# Patient Record
Sex: FEMALE | Employment: OTHER | URBAN - NONMETROPOLITAN AREA
[De-identification: names, ages, dates, MRNs, and addresses within clinical notes are randomized per-mention and may not be internally consistent; named-entity substitution may affect disease eponyms.]

---

## 2017-02-17 ENCOUNTER — TELEPHONE (OUTPATIENT)
Dept: ALLERGY | Facility: OTHER | Age: 62
End: 2017-02-17

## 2017-02-17 DIAGNOSIS — J30.89 PERENNIAL ALLERGIC RHINITIS: ICD-10-CM

## 2017-03-10 ENCOUNTER — TELEPHONE (OUTPATIENT)
Dept: ALLERGY | Facility: OTHER | Age: 62
End: 2017-03-10

## 2017-03-10 NOTE — TELEPHONE ENCOUNTER
[facility] requests a refill of SLIT drops.  This will be done after a signature is obtained from the ENT provider.    Rukhsanas is notified that her last follow-up visit was 11/2016  with Isaura Wilkinson NP.  She is due to fu in November.    She may call her pharmacy should she need new epi pens, and call with concerns prior to fu.    Taya Esteban RN

## 2017-03-22 ENCOUNTER — TRANSFERRED RECORDS (OUTPATIENT)
Dept: HEALTH INFORMATION MANAGEMENT | Facility: CLINIC | Age: 62
End: 2017-03-22

## 2017-03-23 ENCOUNTER — HOSPITAL ENCOUNTER (EMERGENCY)
Facility: HOSPITAL | Age: 62
Discharge: HOME | End: 2017-03-23
Attending: NURSE PRACTITIONER | Admitting: NURSE PRACTITIONER
Payer: MEDICARE

## 2017-03-23 VITALS — OXYGEN SATURATION: 94 % | RESPIRATION RATE: 16 BRPM | TEMPERATURE: 97.9 F | HEART RATE: 93 BPM

## 2017-03-23 DIAGNOSIS — B02.9 HERPES ZOSTER WITHOUT COMPLICATION: ICD-10-CM

## 2017-03-23 PROCEDURE — 99213 OFFICE O/P EST LOW 20 MIN: CPT | Performed by: NURSE PRACTITIONER

## 2017-03-23 PROCEDURE — 99213 OFFICE O/P EST LOW 20 MIN: CPT

## 2017-03-23 RX ORDER — ACYCLOVIR 800 MG/1
800 TABLET ORAL
Qty: 35 TABLET | Refills: 0 | Status: SHIPPED | OUTPATIENT
Start: 2017-03-23 | End: 2017-11-14

## 2017-03-23 ASSESSMENT — ENCOUNTER SYMPTOMS
VOMITING: 0
FLANK PAIN: 0
DIFFICULTY URINATING: 0
CHILLS: 0
DIARRHEA: 0
DYSURIA: 0
HEMATURIA: 0
FEVER: 0
PSYCHIATRIC NEGATIVE: 1
APPETITE CHANGE: 0
COUGH: 0
NUMBNESS: 0
NAUSEA: 0
ACTIVITY CHANGE: 0
FREQUENCY: 0

## 2017-03-23 NOTE — ED NOTES
Pt presents with c/o hip pain that began about 2 weeks ago and has now developed a rash in the area.

## 2017-03-23 NOTE — ED PROVIDER NOTES
History     Chief Complaint   Patient presents with     Hip Pain     with a rash lt side only pain 7/10     The history is provided by the patient. No  was used.     Mattie Carranza is a 61 year old female who presents with pain to her left hip for the past 2 weeks and a rash developed yesterday. Rash has a burning characteristic. She took Aleve with mild effectiveness. She's felt fatigued. Denies fever, chills, or night sweats. Eating and drinking well. Bowel and bladder are working well. Denies injury or trauma. She had chicken pox as a child. She is dealing with family stress with her daughter that has been ongoing since Christmas. She feels the stress is manageable and follows with her PCP.     I have reviewed the Medications, Allergies, Past Medical and Surgical History, and Social History in the Epic system.    Review of Systems   Constitutional: Negative for activity change, appetite change, chills and fever.   Respiratory: Negative for cough.    Gastrointestinal: Negative for diarrhea, nausea and vomiting.   Genitourinary: Negative for difficulty urinating, dysuria, flank pain, frequency, hematuria, urgency and vaginal discharge.   Skin: Positive for rash.        To left lateral side of abd/back.    Neurological: Negative for numbness.   Psychiatric/Behavioral: Negative.        Physical Exam   Pulse: 93  Temp: 97.9  F (36.6  C)  Resp: 16  SpO2: 94 %  Physical Exam   Constitutional: She is oriented to person, place, and time. She appears well-developed and well-nourished. No distress.   HENT:   Mouth/Throat: Oropharynx is clear and moist. No oropharyngeal exudate.   Neck: Normal range of motion. Neck supple.   Cardiovascular: Normal rate, regular rhythm, normal heart sounds and intact distal pulses.    No murmur heard.  Pulmonary/Chest: Effort normal. No respiratory distress. She has no wheezes. She has no rales.   Abdominal: Soft. She exhibits no distension. There is no tenderness.  There is no rebound and no guarding.   Musculoskeletal: Normal range of motion. She exhibits no edema, tenderness or deformity.   ROM and CMS intact to left hip and bilateral lower extremities. Bilateral dorsalis pedal pulses +2.    Lymphadenopathy:     She has no cervical adenopathy.   Neurological: She is alert and oriented to person, place, and time.   Skin: Skin is warm and dry. Rash noted. She is not diaphoretic.   Clustered erythema vesicles to left lateral side of lower abdomen and posterior left side of back in a linear pattern. NO rash on right side of back. No drainage from vesicles.    Psychiatric: She has a normal mood and affect. Her behavior is normal.   Nursing note and vitals reviewed.      ED Course     ED Course     Procedures    Assessments & Plan (with Medical Decision Making)     Discussed plan of care. She would like Acyclovir as her co pay would be more affordable for her. She verbalized understanding. All questions answered.     I have reviewed the nursing notes.    I have reviewed the findings, diagnosis, plan and need for follow up with the patient.  Discharged in stable condition.     Discharge Medication List as of 3/23/2017  2:58 PM      START taking these medications    Details   acyclovir (ZOVIRAX) 800 MG tablet Take 1 tablet (800 mg) by mouth 5 times daily for 7 days, Disp-35 tablet, R-0, E-Prescribe             Final diagnoses:   Herpes zoster without complication     Take Acyclovir as ordered.   You can try Capsaicin cream to rash.   Take tylenol and or ibuprofen for discomfort.   Follow up with PCP with any increase in symptoms or concerns.   Return to urgent care or emergency department with any increase in symptoms or concerns.     BAYRON Sampson  3/23/2017  2:18 PM  [facility]       Marychuy Duran NP  03/28/17 [id number]

## 2017-03-23 NOTE — ED AVS SNAPSHOT
[facility]    [address]    [facility] [address]    Phone:  [phone]                                       Mattie Carranza   MRN: [id number]    Department:  [facility]   Date of Visit:  3/23/2017           Patient Information     Date Of Birth          1955        Your diagnoses for this visit were:     Herpes zoster without complication        You were seen by Marychuy Duran NP.      Follow-up Information     Follow up with Gina Howard MD.    Why:  As needed, If symptoms worsen    Contact information:    [facility]  [address] [facility] [address]  [phone]          Follow up with [facility].    Specialty:  EMERGENCY MEDICINE    Why:  As needed, If symptoms worsen    Contact information:    [address]  [facility] [address] [phone]    Additional information:    From [facility]: Take [address]. Turn left at [address]. Turn left at the first stoplight on [address]. At the first stop sign, take a right onto [address]. Take a left into the parking lot and continue through until you reach the North enterance of the building.       From [place]: Take [address]. Take the [address] ramp towards [place]. Turn left onto [address]. Take a slight right onto [address]. Turn left at the first stoplight on [address]. At the first stop sign, take a right onto [address]. Take a left into the parking lot and continue through until you reach the North enterance of the building.       From [place]: Take [address]. Take a right at [address]. At the first stop sign, take a right onto [address]. Take a left into the parking lot and continue through until you reach the North enterance of the building.         Discharge Instructions       Take Acyclovir as ordered.   You can try Capsaicin cream to rash.   Take tylenol and  or ibuprofen for discomfort.   Follow up with PCP with any increase in symptoms or concerns.   Return to urgent care or emergency department with any increase in symptoms or concerns.     Discharge References/Attachments     SHINGLES (HERPES ZOSTER) (ENGLISH)         Review of your medicines      START taking        Dose / Directions Last dose taken    acyclovir 800 MG tablet   Commonly known as:  ZOVIRAX   Dose:  800 mg   Quantity:  35 tablet        Take 1 tablet (800 mg) by mouth 5 times daily for 7 days   Refills:  0          Our records show that you are taking the medicines listed below. If these are incorrect, please call your family doctor or clinic.        Dose / Directions Last dose taken    albuterol 108 (90 BASE) MCG/ACT Inhaler   Commonly known as:  PROAIR HFA/PROVENTIL HFA/VENTOLIN HFA   Dose:  2 puff   Quantity:  1 Inhaler        Inhale 2 puffs into the lungs every 6 hours as needed for shortness of breath / dyspnea.   Refills:  1        cholecalciferol 5000 UNITS Caps   Dose:  5000 Units        Take 1 capsule (5,000 Units) by mouth daily   Refills:  0        EPINEPHrine 0.3 MG/0.3ML injection   Dose:  0.3 mg   Quantity:  2 each        Inject 0.3 mLs (0.3 mg) into the muscle once as needed   Refills:  1        fluticasone 27.5 MCG/SPRAY spray   Commonly known as:  VERAMYST   Dose:  2 spray   Quantity:  30 g        Spray 2 sprays into both nostrils daily   Refills:  12        KLONOPIN PO   Dose:  0.5 mg        Take 0.5 mg by mouth At Bedtime   Refills:  0        LANCETS MISC.        Test 1 time daily   Refills:  0        LEVOTHYROXINE SODIUM PO   Dose:  50 mcg        Take 50 mcg by mouth   Refills:  0        loratadine 10 MG tablet   Commonly known as:  CLARITIN   Dose:  10 mg   Quantity:  90 tablet        Take 1 tablet (10 mg) by mouth daily   Refills:  3        montelukast 10 MG tablet   Commonly known as:  SINGULAIR   Dose:  10 mg   Quantity:  90 tablet        Take 1 tablet (10 mg) by mouth At  "Bedtime   Refills:  3        PRECISION XTRA GLUCOSE test strip   Generic drug:  blood glucose monitoring        Text 1 time by finger poke route every day   Refills:  0        PROBIOTIC FORMULA PO        Take  by mouth daily.      Refills:  0        SIMVASTATIN PO   Dose:  40 mg        Take 40 mg by mouth At Bedtime   Refills:  0        SUBLINGUAL IMMUNOTHERAPY (SLIT)   Quantity:  1 Bottle        Continue SLIT, 1 drop TID (SL) , following standard maintenance protocol.   Refills:  3        TRAZODONE HCL PO   Dose:  100 mg        Take 100 mg by mouth At Bedtime   Refills:  0                Prescriptions were sent or printed at these locations (1 Prescription)                   [id number] - [place], [place] - [address] [place] [address]  AT [facility] [address]   [address]  [facility] [address]    Telephone:  [phone]   Fax:  [phone]   Hours:                  E-Prescribed (1 of 1)         acyclovir (ZOVIRAX) 800 MG tablet                Orders Needing Specimen Collection     None      Pending Results     No orders found from 3/21/2017 to 3/24/2017.            Pending Culture Results     No orders found from 3/21/2017 to 3/24/2017.            Thank you for choosing [facility]       Thank you for choosing [facility] for your care. Our goal is always to provide you with excellent care. Hearing back from our patients is one way we can continue to improve our services. Please take a few minutes to complete the written survey that you may receive in the mail after you visit with us. Thank you!        [facility] Information     [employer] lets you send messages to your doctor, view your test results, renew your prescriptions, schedule appointments and more. To sign up, go to [email or web address] . Click on \"Log in\" on the left side of the screen, which will take you to the Welcome page. Then click on \"Sign up Now\" on the right side of the page.     You will be asked to enter the access code listed " below, as well as some personal information. Please follow the directions to create your username and password.     Your access code is: [email or web address]  Expires: 2017  2:53 PM     Your access code will  in 90 days. If you need help or a new code, please call your [facility] clinic or [phone].        Care EveryWhere ID     This is your Care EveryWhere ID. This could be used by other organizations to access your [facility] medical records  [id number]        After Visit Summary       This is your record. Keep this with you and show to your community pharmacist(s) and doctor(s) at your next visit.

## 2017-03-23 NOTE — ED NOTES
"Pt ambulated to room 4 independently. Pt reports pain \"inside\" her torso only on the lt side for past 2 weeks, rash \"blisters\" across Lt hip started yesterday. No discomfort or rash on the rt side of her body.   "

## 2017-03-23 NOTE — ED NOTES
Patient discharged ambulatory with verbal understanding stated to  her prescription at [facility] in [place] and to follow up for worsening symptoms or concerns.

## 2017-03-23 NOTE — ED AVS SNAPSHOT
[facility]    [address]    [place] [address]    Phone:  [phone]                                       Mattie Carranza   MRN: [id number]    Department:  [facility]   Date of Visit:  3/23/2017           After Visit Summary Signature Page     I have received my discharge instructions, and my questions have been answered. I have discussed any challenges I see with this plan with the nurse or doctor.    ..........................................................................................................................................  Patient/Patient Representative Signature      ..........................................................................................................................................  Patient Representative Print Name and Relationship to Patient    ..................................................               ................................................  Date                                            Time    ..........................................................................................................................................  Reviewed by Signature/Title    ...................................................              ..............................................  Date                                                            Time

## 2017-05-02 DIAGNOSIS — I20.0 UNSTABLE ANGINA (H): Primary | ICD-10-CM

## 2017-05-10 DIAGNOSIS — I20.0 UNSTABLE ANGINA (H): Primary | ICD-10-CM

## 2017-05-11 ENCOUNTER — HOSPITAL ENCOUNTER (OUTPATIENT)
Dept: NUCLEAR MEDICINE | Facility: HOSPITAL | Age: 62
Discharge: HOME | End: 2017-05-11
Attending: NURSE PRACTITIONER | Admitting: NURSE PRACTITIONER
Payer: MEDICARE

## 2017-05-11 PROCEDURE — A9500 TC99M SESTAMIBI: HCPCS | Mod: TC

## 2017-05-11 PROCEDURE — 78452 HT MUSCLE IMAGE SPECT MULT: CPT | Mod: TC

## 2017-05-11 PROCEDURE — 93016 CV STRESS TEST SUPVJ ONLY: CPT | Performed by: INTERNAL MEDICINE

## 2017-05-11 PROCEDURE — 93018 CV STRESS TEST I&R ONLY: CPT | Performed by: INTERNAL MEDICINE

## 2017-05-11 PROCEDURE — 93017 CV STRESS TEST TRACING ONLY: CPT | Mod: TC | Performed by: INTERNAL MEDICINE

## 2017-06-08 DIAGNOSIS — Z12.31 VISIT FOR SCREENING MAMMOGRAM: Primary | ICD-10-CM

## 2017-06-08 PROCEDURE — G0202 SCR MAMMO BI INCL CAD: HCPCS | Mod: TC

## 2017-06-08 PROCEDURE — 77063 BREAST TOMOSYNTHESIS BI: CPT | Mod: TC

## 2017-06-12 ENCOUNTER — TELEPHONE (OUTPATIENT)
Dept: ALLERGY | Facility: OTHER | Age: 62
End: 2017-06-12

## 2017-06-12 DIAGNOSIS — T78.40XD ALLERGIC REACTION, SUBSEQUENT ENCOUNTER: Primary | ICD-10-CM

## 2017-06-12 NOTE — TELEPHONE ENCOUNTER
"[facility] requests a refill of SLIT drops.  This will be done after a signature is obtained from the ENT provider.    Their last follow-up visit was 11/2016 with Ruma Minh.  She is not due for an appointment at this time.    She will fu in November.  She is doing fine on drops.  She does not have current epi pens, and is not willing to buy them.    She states they are too expensive.  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  I told her this would have to be ordered through [facility], if she is in agreement.    This message is forwarded to [facility] with her chart, to advise.    Taya Esteban RN     "

## 2017-06-13 RX ORDER — EPINEPHRINE 0.3 MG/.3ML
0.3 INJECTION SUBCUTANEOUS PRN
Qty: 0.6 ML | Refills: 1 | Status: SHIPPED | OUTPATIENT
Start: 2017-06-13 | End: 2017-11-14

## 2017-06-13 NOTE — TELEPHONE ENCOUNTER
Will call in Twinject- See if these are covered first. TR    Sent to [facility].

## 2017-06-13 NOTE — TELEPHONE ENCOUNTER
Mattie is notified that Twinject was sent to her pharmacy.  She will call if there is a problem with that being covered.  Taya Esteban

## 2017-08-30 ENCOUNTER — TELEPHONE (OUTPATIENT)
Dept: ALLERGY | Facility: OTHER | Age: 62
End: 2017-08-30

## 2017-08-30 NOTE — TELEPHONE ENCOUNTER
[facility] requests a refill of SLIT drops.  This will be done after a signature is obtained from the ENT provider.    I spoke with Mattie.  She is doing fine on drops and has current epi pens.    Her last follow-up visit was 11/2016 with [facility] Ramos.  She is due for an appointment in November, and this has been arranged for her for 11/14/17.    She agrees with this plan.  Taya Esteban RN

## 2017-11-14 ENCOUNTER — OFFICE VISIT (OUTPATIENT)
Dept: OTOLARYNGOLOGY | Facility: OTHER | Age: 62
End: 2017-11-14
Attending: PHYSICIAN ASSISTANT
Payer: MEDICARE

## 2017-11-14 VITALS
SYSTOLIC BLOOD PRESSURE: 132 MMHG | DIASTOLIC BLOOD PRESSURE: 70 MMHG | WEIGHT: 243 LBS | BODY MASS INDEX: 35.99 KG/M2 | HEIGHT: 69 IN | OXYGEN SATURATION: 93 % | TEMPERATURE: 98.4 F | HEART RATE: 67 BPM | RESPIRATION RATE: 20 BRPM

## 2017-11-14 DIAGNOSIS — G47.33 OSA (OBSTRUCTIVE SLEEP APNEA): ICD-10-CM

## 2017-11-14 DIAGNOSIS — J30.2 SEASONAL ALLERGIC RHINITIS, UNSPECIFIED CHRONICITY, UNSPECIFIED TRIGGER: Primary | ICD-10-CM

## 2017-11-14 DIAGNOSIS — H10.13 ALLERGIC CONJUNCTIVITIS, BILATERAL: ICD-10-CM

## 2017-11-14 DIAGNOSIS — R53.83 OTHER FATIGUE: ICD-10-CM

## 2017-11-14 DIAGNOSIS — Z71.6 ENCOUNTER FOR TOBACCO USE CESSATION COUNSELING: ICD-10-CM

## 2017-11-14 DIAGNOSIS — J30.89 PERENNIAL ALLERGIC RHINITIS: ICD-10-CM

## 2017-11-14 LAB
BASOPHILS # BLD AUTO: 0 10E9/L (ref 0–0.2)
BASOPHILS NFR BLD AUTO: 0.3 %
DIFFERENTIAL METHOD BLD: ABNORMAL
EOSINOPHIL # BLD AUTO: 0.1 10E9/L (ref 0–0.7)
EOSINOPHIL NFR BLD AUTO: 0.5 %
ERYTHROCYTE [DISTWIDTH] IN BLOOD BY AUTOMATED COUNT: 12.5 % (ref 10–15)
HCT VFR BLD AUTO: 43.1 % (ref 35–47)
HGB BLD-MCNC: 15.1 G/DL (ref 11.7–15.7)
IMM GRANULOCYTES # BLD: 0.1 10E9/L (ref 0–0.4)
IMM GRANULOCYTES NFR BLD: 0.5 %
LYMPHOCYTES # BLD AUTO: 1.8 10E9/L (ref 0.8–5.3)
LYMPHOCYTES NFR BLD AUTO: 14.9 %
MCH RBC QN AUTO: 30.9 PG (ref 26.5–33)
MCHC RBC AUTO-ENTMCNC: 35 G/DL (ref 31.5–36.5)
MCV RBC AUTO: 88 FL (ref 78–100)
MONOCYTES # BLD AUTO: 0.7 10E9/L (ref 0–1.3)
MONOCYTES NFR BLD AUTO: 6.1 %
NEUTROPHILS # BLD AUTO: 9.2 10E9/L (ref 1.6–8.3)
NEUTROPHILS NFR BLD AUTO: 77.7 %
NRBC # BLD AUTO: 0 10*3/UL
NRBC BLD AUTO-RTO: 0 /100
PLATELET # BLD AUTO: 134 10E9/L (ref 150–450)
RBC # BLD AUTO: 4.88 10E12/L (ref 3.8–5.2)
WBC # BLD AUTO: 11.8 10E9/L (ref 4–11)

## 2017-11-14 PROCEDURE — 85025 COMPLETE CBC W/AUTO DIFF WBC: CPT | Mod: ZL | Performed by: PHYSICIAN ASSISTANT

## 2017-11-14 PROCEDURE — 99212 OFFICE O/P EST SF 10 MIN: CPT

## 2017-11-14 PROCEDURE — 36415 COLL VENOUS BLD VENIPUNCTURE: CPT | Mod: ZL | Performed by: PHYSICIAN ASSISTANT

## 2017-11-14 PROCEDURE — 99213 OFFICE O/P EST LOW 20 MIN: CPT | Performed by: PHYSICIAN ASSISTANT

## 2017-11-14 RX ORDER — LEVOCETIRIZINE DIHYDROCHLORIDE 5 MG/1
5 TABLET, FILM COATED ORAL EVERY EVENING
Qty: 90 TABLET | Refills: 11 | Status: SHIPPED | OUTPATIENT
Start: 2017-11-14 | End: 2018-07-31

## 2017-11-14 ASSESSMENT — PAIN SCALES - GENERAL: PAINLEVEL: SEVERE PAIN (6)

## 2017-11-14 NOTE — PATIENT INSTRUCTIONS
Continue with allergy drops.   Continue w/ Natan Med Rinse.   CBC to be completed. Nurse will call w/ results.   Start Xyzal one tablet daily. Hold Claritin.   Continue with Veramyst nasal spray.   Consider alternative nasal spray (Dymista) Call nurse in 2-3 weeks if there is no improvement.   Nurse- [facility] [phone]     If there are concerns or questions, Call [phone] and ask for nurse

## 2017-11-14 NOTE — PROGRESS NOTES
Chief Complaint   Patient presents with     RECHECK     Follow up SLIT   Mattie Carranza presents for a 12 month follow up for SLIT    Has been doing well with drops.  Denies oral itching/swelling  Has noticed improvement with allergy symptoms    She has abdominal discomfort/ GI complaints. Patient is seeing her PCP shortly.     Has been using Claritin and Singulair over the summer and fall for symptoms without much relief. Did encourage to stay on daily antihistamine at least until she reaches maintenance.   Currently on TID of SLIT, maintenance dose.      She does not tolerate chriss med sinus rinses.    No jessi sinusitis  No recent illnesses or sinus infections     Epi pen is up to date    Patient is on CPAP with good use. Patient reports full night use. No concerns. New machine was received this past few weeks.       Past Medical History:   Diagnosis Date     Adenomatous colon polyp 10/17/2011     ADHD (attention deficit hyperactivity disorder) 10/17/2011     Bipolar I disorder 10/17/2011     Degenerative disk disease 10/17/2011     Diabetes type 2, controlled 10/17/2011     Diverticulosis 10/17/2011     Dyslipidemia 10/17/2011     Insomnia 9/20/2012     Irritable bowel syndrome 10/17/2011     JOSE on CPAP 9/20/2012     Seizures 8/1/2012     Tobacco use disorder 10/17/2011        Allergies   Allergen Reactions     Arthrotec      GI upset     Aspirin Hives     Bupropion Nausea     pain     Codeine Nausea     [employer] Hives     Diazepam Other (See Comments)     Valium  Increased anxiety     Dye [Contrast Dye] Hives     IV dye, iodine containing contrast media     Iodine      Latex      Meperidine Nausea     FUMCM Dischg Summary. If given alone     Midazolam      FUMCM Dischg Summary. Patient unaware     Midazolam Hcl      Versed     Other [Seasonal Allergies] Hives     Also allergic to sun.  Allergist told her she is allergic to the sun.     Penicillins Hives     Shellfish Allergy Hives and  "Swelling     Shrimp     Sulfa Drugs Hives     Sulfa (sulfonamide antibiotics)       Ziprasidone      FUMCM Dischg Summary. Patient unaware     Current Outpatient Prescriptions   Medication     SUBLINGUAL IMMUNOTHERAPY, SLIT,     SIMVASTATIN PO     TRAZODONE HCL PO     ClonazePAM (KLONOPIN PO)     montelukast (SINGULAIR) 10 MG tablet     loratadine (CLARITIN) 10 MG tablet     fluticasone (VERAMYST) 27.5 MCG/SPRAY nasal spray     cholecalciferol 5000 UNITS CAPS     LEVOTHYROXINE SODIUM PO     EPINEPHrine (EPIPEN) 0.3 MG/0.3ML injection     LANCETS MISC.     glucose blood VI test strips (PRECISION XTRA GLUCOSE) strip     Probiotic Product (PROBIOTIC FORMULA PO)     albuterol (PROAIR HFA, PROVENTIL HFA, VENTOLIN HFA) 108 (90 BASE) MCG/ACT inhaler     No current facility-administered medications for this visit.       ROS: 10 point ROS neg other than the symptoms noted above in the HPI.  /70  Pulse 67  Temp 98.4  F (36.9  C) (Tympanic)  Resp 20  Ht 5' 9\" (1.753 m)  Wt 243 lb (110.2 kg)  SpO2 93%  BMI 35.88 kg/m2  General - The patient is well nourished and well developed.  Alert and oriented to person and place, answers questions and cooperates with examination appropriately.   Head and Face - Normocephalic and atraumatic, with no gross asymmetry noted.  The facial nerve is intact, with strong symmetric movements.  Voice and Breathing - The patient was breathing comfortably without the use of accessory muscles. There was no wheezing, stridor, or stertor.  The patients voice was clear and strong, and had appropriate pitch and quality.  Ears - External ears are normal in appearance. The external auditory canals are patent, the tympanic membranes are intact without effusion, retraction or mass.  Bony landmarks are intact.  Eyes - Extraocular movements intact.  Conjunctiva were not injected.  Mouth - Examination of the oral cavity showed pink, healthy oral mucosa. No lesions or ulcerations noted.  The tongue was " mobile and midline, and the dentition were in good condition.    Throat - The walls of the oropharynx were smooth, pink, moist, symmetric, and had no lesions or ulcerations.  The tonsillar pillars and soft palate were symmetric.  The uvula was midline on elevation.    Neck -  Palpation of the occipital, submental, submandibular, internal jugular chain, and supraclavicular nodes did not demonstrate any abnormal lymph nodes or masses.  Palpation of the thyroid was soft and smooth, with no nodules or goiter appreciated.  The trachea was mobile and midline. No palpable lymphadenopathy or mass.   Nose - External contour is symmetric, no gross deflection or scars.  Nasal mucosa is pink and moist with no abnormal mucus.  The septum was intact, turbinates of normal size and position.  No polyps, masses, or purulence noted on examination.       ASSESSMENT:    ICD-10-CM    1. Seasonal allergic rhinitis, unspecified chronicity, unspecified trigger J30.2 levocetirizine (XYZAL) 5 MG tablet   2. Perennial allergic rhinitis J30.89    3. Allergic conjunctivitis, bilateral H10.13 levocetirizine (XYZAL) 5 MG tablet   4. Other fatigue R53.83 CBC with platelets differential     CANCELED: CBC with platelets and differential   5. Encounter for tobacco use cessation counseling Z71.6    6. JOSE (obstructive sleep apnea) G47.33          Continue with allergy drops.   Continue w/ Natan Med Rinse.   CBC to be completed. Nurse will call w/ results.   Patient has an appointment w/ PCP for several other complaints.     Start Xyzal one tablet daily. Hold Claritin.   Continue with Veramyst nasal spray.   Consider alternative nasal spray (Dymista) Call nurse in 2-3 weeks if there is no improvement.   If continued symptoms, may repeat MQT.   Continue with CPAP. Untreated sleep apnea risk factors reviewed with patient.     It takes 20 years of quitting tobacco to be at same risk of developing head and neck cancer as a never smoker.  This was discussed  with the patient today and they voiced understanding.  Tobacco cessation was strongly encouraged. Patient did quit smoking.       Samantha Donato PA-C  [facility], [place]  [phone]

## 2017-11-14 NOTE — NURSING NOTE
"Chief Complaint   Patient presents with     RECHECK     Follow up SLIT       Initial /70  Pulse 67  Temp 98.4  F (36.9  C) (Tympanic)  Resp 20  Ht 5' 9\" (1.753 m)  Wt 243 lb (110.2 kg)  SpO2 93%  BMI 35.88 kg/m2 Estimated body mass index is 35.88 kg/(m^2) as calculated from the following:    Height as of this encounter: 5' 9\" (1.753 m).    Weight as of this encounter: 243 lb (110.2 kg).  Medication Reconciliation: complete   Ruma Shelton      "

## 2017-11-14 NOTE — MR AVS SNAPSHOT
"              After Visit Summary   11/14/2017    Mattie Carranza    MRN: [id number]           Patient Information     Date Of Birth          1955        Visit Information        Provider Department      11/14/2017 11:15 AM Samantha Donato PA-C [facility]        Today's Diagnoses     Seasonal allergic rhinitis, unspecified chronicity, unspecified trigger    -  1    Perennial allergic rhinitis        Allergic conjunctivitis, bilateral        Other fatigue          Care Instructions    Continue with allergy drops.   Continue w/ Natan Med Rinse.   CBC to be completed. Nurse will call w/ results.   Start Xyzal one tablet daily. Hold Claritin.   Continue with Veramyst nasal spray.   Consider alternative nasal spray (Dymista) Call nurse in 2-3 weeks if there is no improvement.   Nurse- [facility] [phone]     If there are concerns or questions, Call [phone] and ask for nurse                  Follow-ups after your visit        Who to contact     If you have questions or need follow up information about today's clinic visit or your schedule please contact [facility] directly at [phone].  Normal or non-critical lab and imaging results will be communicated to you by [employer], letter or phone within 4 business days after the clinic has received the results. If you do not hear from us within 7 days, please contact the clinic through [employer] or phone. If you have a critical or abnormal lab result, we will notify you by phone as soon as possible.  Submit refill requests through [employer] or call your pharmacy and they will forward the refill request to us. Please allow 3 business days for your refill to be completed.          Additional Information About Your Visit        Stemgent Information     [employer]gent lets you send messages to your doctor, view your test results, renew your prescriptions, schedule appointments and more. To sign up, go to [email or web address] . Click on \"Log in\" on the left side of " "the screen, which will take you to the Welcome page. Then click on \"Sign up Now\" on the right side of the page.     You will be asked to enter the access code listed below, as well as some personal information. Please follow the directions to create your username and password.     Your access code is: [email or web address]  Expires: 2018 11:51 AM     Your access code will  in 90 days. If you need help or a new code, please call your [facility] clinic or [phone].        Care EveryWhere ID     This is your Care EveryWhere ID. This could be used by other organizations to access your [facility] medical records  [id number]        Your Vitals Were     Pulse Temperature Respirations Height Pulse Oximetry BMI (Body Mass Index)    67 98.4  F (36.9  C) (Tympanic) 20 5' 9\" (1.753 m) 93% 35.88 kg/m2       Blood Pressure from Last 3 Encounters:   17 132/70   16 128/70   16 126/80    Weight from Last 3 Encounters:   17 243 lb (110.2 kg)   16 232 lb (105.2 kg)   16 240 lb (108.9 kg)              We Performed the Following     CBC with platelets and differential          Today's Medication Changes          These changes are accurate as of: 17 11:51 AM.  If you have any questions, ask your nurse or doctor.               Start taking these medicines.        Dose/Directions    levocetirizine 5 MG tablet   Commonly known as:  XYZAL   Used for:  Seasonal allergic rhinitis, unspecified chronicity, unspecified trigger, Allergic conjunctivitis, bilateral   Started by:  Samantha Donato PA-C        Dose:  5 mg   Take 1 tablet (5 mg) by mouth every evening   Quantity:  90 tablet   Refills:  11         These medicines have changed or have updated prescriptions.        Dose/Directions    cholecalciferol 5000 UNITS Caps   This may have changed:  how much to take   Used for:  Dyslipidemia        Dose:  5000 Units   Take 1 capsule (5,000 Units) by mouth daily   Refills:  0       EPINEPHrine 0.3 MG/0.3ML " injection 2-pack   Commonly known as:  EPIPEN/ADRENACLICK/or ANY BX GENERIC EQUIV   This may have changed:  Another medication with the same name was removed. Continue taking this medication, and follow the directions you see here.   Used for:  Perennial allergic rhinitis, Allergic reaction, subsequent encounter   Changed by:  Samantha Donato PA-C        Dose:  0.3 mg   Inject 0.3 mLs (0.3 mg) into the muscle once as needed   Quantity:  2 each   Refills:  1       montelukast 10 MG tablet   Commonly known as:  SINGULAIR   This may have changed:    - when to take this  - reasons to take this   Used for:  Seasonal allergic rhinitis, Perennial allergic rhinitis        Dose:  10 mg   Take 1 tablet (10 mg) by mouth At Bedtime   Quantity:  90 tablet   Refills:  3         Stop taking these medicines if you haven't already. Please contact your care team if you have questions.     acyclovir 800 MG tablet   Commonly known as:  ZOVIRAX   Stopped by:  Samantha Donato PA-C                Where to get your medicines      These medications were sent to [facility] [place] [address]  [address] [place] [address]     Phone:  [phone]     levocetirizine 5 MG tablet                Primary Care Provider Office Phone # Fax #    Ginamartha Bhat [phone]       [address] [address]        Equal Access to Services     [facility] ARTIE AH: Hadii razia mena hadasho Sokatherine, waaxda luqadaha, qaybta kaalmada moy, chay ramirez. So [facility] [phone].    ATENCIÓN: Si habla español, tiene a campos disposición servicios gratuitos de asistencia lingüística. Shirley al [phone].    We comply with applicable federal civil rights laws and [place] laws. We do not discriminate on the basis of race, color, national origin, age, disability, sex, sexual orientation, or gender identity.            Thank you!     Thank you for choosing  [facility]  for your care. Our goal is always to provide you with excellent care. Hearing back from our patients is one way we can continue to improve our services. Please take a few minutes to complete the written survey that you may receive in the mail after your visit with us. Thank you!             Your Updated Medication List - Protect others around you: Learn how to safely use, store and throw away your medicines at www.disposemymeds.org.          This list is accurate as of: 11/14/17 11:51 AM.  Always use your most recent med list.                   Brand Name Dispense Instructions for use Diagnosis    albuterol 108 (90 BASE) MCG/ACT Inhaler    PROAIR HFA/PROVENTIL HFA/VENTOLIN HFA    1 Inhaler    Inhale 2 puffs into the lungs every 6 hours as needed for shortness of breath / dyspnea.    Bronchitis, acute       cholecalciferol 5000 UNITS Caps      Take 1 capsule (5,000 Units) by mouth daily    Dyslipidemia       EPINEPHrine 0.3 MG/0.3ML injection 2-pack    EPIPEN/ADRENACLICK/or ANY BX GENERIC EQUIV    2 each    Inject 0.3 mLs (0.3 mg) into the muscle once as needed    Perennial allergic rhinitis, Allergic reaction, subsequent encounter       fluticasone 27.5 MCG/SPRAY spray    VERAMYST    30 g    Spray 2 sprays into both nostrils daily    Perennial allergic rhinitis       KLONOPIN PO      Take 0.5 mg by mouth At Bedtime        LANCETS MISC.      Test 1 time daily        levocetirizine 5 MG tablet    XYZAL    90 tablet    Take 1 tablet (5 mg) by mouth every evening    Seasonal allergic rhinitis, unspecified chronicity, unspecified trigger, Allergic conjunctivitis, bilateral       LEVOTHYROXINE SODIUM PO      Take 50 mcg by mouth        loratadine 10 MG tablet    CLARITIN    90 tablet    Take 1 tablet (10 mg) by mouth daily    Perennial allergic rhinitis       montelukast 10 MG tablet    SINGULAIR    90 tablet    Take 1 tablet (10 mg) by mouth At Bedtime    Seasonal allergic rhinitis, Perennial  allergic rhinitis       PRECISION XTRA GLUCOSE test strip   Generic drug:  blood glucose monitoring      Text 1 time by finger poke route every day        PROBIOTIC FORMULA PO      Take  by mouth daily.           SIMVASTATIN PO      Take 40 mg by mouth At Bedtime        SUBLINGUAL IMMUNOTHERAPY (SLIT)     1 Bottle    Continue SLIT, 1 drop TID (SL) , following standard maintenance protocol.    Perennial allergic rhinitis       TRAZODONE HCL PO      Take 100 mg by mouth At Bedtime

## 2017-11-16 ENCOUNTER — TELEPHONE (OUTPATIENT)
Dept: ALLERGY | Facility: OTHER | Age: 62
End: 2017-11-16

## 2017-11-16 ENCOUNTER — TRANSFERRED RECORDS (OUTPATIENT)
Dept: HEALTH INFORMATION MANAGEMENT | Facility: HOSPITAL | Age: 62
End: 2017-11-16

## 2017-11-16 NOTE — TELEPHONE ENCOUNTER
Patient needs SLIT refill, per SUBHA Ramos, who just saw patient.  Note indicates drops are going well and patient's epi-pen is current.  Will process refill.

## 2018-03-02 ENCOUNTER — MEDICAL CORRESPONDENCE (OUTPATIENT)
Dept: HEALTH INFORMATION MANAGEMENT | Facility: HOSPITAL | Age: 63
End: 2018-03-02

## 2018-03-02 ENCOUNTER — TELEPHONE (OUTPATIENT)
Dept: ALLERGY | Facility: OTHER | Age: 63
End: 2018-03-02

## 2018-03-02 DIAGNOSIS — J30.89 PERENNIAL ALLERGIC RHINITIS: ICD-10-CM

## 2018-03-02 NOTE — TELEPHONE ENCOUNTER
Spoke with patient regarding SLIT refill.  Patient states drops are going fine.  Patient's epi-pen is current.  Patient is not due for a follow-up at this time.  Will process refill.    Sallie Reeder RN

## 2018-03-08 ENCOUNTER — MEDICAL CORRESPONDENCE (OUTPATIENT)
Dept: HEALTH INFORMATION MANAGEMENT | Facility: HOSPITAL | Age: 63
End: 2018-03-08

## 2018-05-21 DIAGNOSIS — J20.9 BRONCHITIS, ACUTE: ICD-10-CM

## 2018-05-21 NOTE — TELEPHONE ENCOUNTER
Ventolin  Last Written Prescription Date: 3/28/13  Last Fill Quantity: 1 # of Refills: 1  Last Office Visit: 8/28/13

## 2018-05-22 ENCOUNTER — TELEPHONE (OUTPATIENT)
Dept: OTOLARYNGOLOGY | Facility: OTHER | Age: 63
End: 2018-05-22

## 2018-05-22 DIAGNOSIS — J45.20 MILD INTERMITTENT REACTIVE AIRWAY DISEASE WITHOUT COMPLICATION: Primary | ICD-10-CM

## 2018-05-22 RX ORDER — ALBUTEROL SULFATE 90 UG/1
2 AEROSOL, METERED RESPIRATORY (INHALATION) EVERY 6 HOURS PRN
Qty: 1 INHALER | Refills: 0 | OUTPATIENT
Start: 2018-05-22

## 2018-05-22 NOTE — TELEPHONE ENCOUNTER
Patient was notified of medication refusal. Patient states she does not see Dr. Ramirez but she is Samantha Donato for the inhalers. Patient would like a call back from the ENT department. Please contact patient to advise.   Sherri Degroot CMA

## 2018-05-22 NOTE — TELEPHONE ENCOUNTER
Please see note below for Ventolin.  Patient needs appointment.  Last office visit: 8/28/13.  Please advise.  Thank you.

## 2018-05-22 NOTE — TELEPHONE ENCOUNTER
Pt called and states that she uses albuterol HFA and Ventolin HFA.  She states that she needs Ventolin HFA called into [facility].  Please advise.

## 2018-05-23 RX ORDER — ALBUTEROL SULFATE 90 UG/1
2 AEROSOL, METERED RESPIRATORY (INHALATION) EVERY 6 HOURS PRN
Qty: 1 INHALER | Refills: 1 | Status: SHIPPED | OUTPATIENT
Start: 2018-05-23 | End: 2023-06-27

## 2018-06-13 ENCOUNTER — TELEPHONE (OUTPATIENT)
Dept: ALLERGY | Facility: OTHER | Age: 63
End: 2018-06-13

## 2018-06-13 NOTE — TELEPHONE ENCOUNTER
[facility] requests a refill of SLIT drops.  This will be done after a signature is obtained from the ENT provider.    Her last follow-up visit was 11/2017 with SUBHA Ramos.  The patient is not due for an appointment at this time.    I spoke with the patient.  She is doing fine on drops, and has current epi pens.   She will call in October to schedule her November FU.     Taya Esteban RN ;l

## 2018-07-11 ENCOUNTER — TRANSFERRED RECORDS (OUTPATIENT)
Dept: HEALTH INFORMATION MANAGEMENT | Facility: CLINIC | Age: 63
End: 2018-07-11

## 2018-08-01 ENCOUNTER — ANESTHESIA EVENT (OUTPATIENT)
Dept: SURGERY | Facility: CLINIC | Age: 63
DRG: 501 | End: 2018-08-01
Payer: MEDICARE

## 2018-08-02 ENCOUNTER — SURGERY (OUTPATIENT)
Age: 63
End: 2018-08-02

## 2018-08-02 ENCOUNTER — APPOINTMENT (OUTPATIENT)
Dept: GENERAL RADIOLOGY | Facility: CLINIC | Age: 63
DRG: 501 | End: 2018-08-02
Attending: PODIATRIST
Payer: MEDICARE

## 2018-08-02 ENCOUNTER — HOSPITAL ENCOUNTER (INPATIENT)
Facility: CLINIC | Age: 63
LOS: 2 days | Discharge: HOME HEALTH | DRG: 501 | End: 2018-08-06
Attending: PODIATRIST | Admitting: PODIATRIST
Payer: MEDICARE

## 2018-08-02 ENCOUNTER — ANESTHESIA (OUTPATIENT)
Dept: SURGERY | Facility: CLINIC | Age: 63
DRG: 501 | End: 2018-08-02
Payer: MEDICARE

## 2018-08-02 DIAGNOSIS — Z98.890 STATUS POST OSTEOTOMY: Primary | ICD-10-CM

## 2018-08-02 LAB
GLUCOSE BLDC GLUCOMTR-MCNC: 101 MG/DL (ref 70–99)
GLUCOSE BLDC GLUCOMTR-MCNC: 81 MG/DL (ref 70–99)

## 2018-08-02 PROCEDURE — 25000125 ZZHC RX 250: Performed by: NURSE ANESTHETIST, CERTIFIED REGISTERED

## 2018-08-02 PROCEDURE — C1713 ANCHOR/SCREW BN/BN,TIS/BN: HCPCS | Performed by: PODIATRIST

## 2018-08-02 PROCEDURE — 40000277 XR SURGERY CARM FLUORO LESS THAN 5 MIN W STILLS: Mod: TC

## 2018-08-02 PROCEDURE — 25000128 H RX IP 250 OP 636: Performed by: PODIATRIST

## 2018-08-02 PROCEDURE — 0LXW0ZZ TRANSFER LEFT FOOT TENDON, OPEN APPROACH: ICD-10-PCS | Performed by: PODIATRIST

## 2018-08-02 PROCEDURE — 82962 GLUCOSE BLOOD TEST: CPT

## 2018-08-02 PROCEDURE — C1762 CONN TISS, HUMAN(INC FASCIA): HCPCS | Performed by: PODIATRIST

## 2018-08-02 PROCEDURE — 25000128 H RX IP 250 OP 636: Performed by: NURSE ANESTHETIST, CERTIFIED REGISTERED

## 2018-08-02 PROCEDURE — 25000132 ZZH RX MED GY IP 250 OP 250 PS 637: Mod: GY | Performed by: PODIATRIST

## 2018-08-02 PROCEDURE — 0MUT07Z SUPPLEMENT LEFT FOOT BURSA AND LIGAMENT WITH AUTOLOGOUS TISSUE SUBSTITUTE, OPEN APPROACH: ICD-10-PCS | Performed by: PODIATRIST

## 2018-08-02 PROCEDURE — 0QSM04Z REPOSITION LEFT TARSAL WITH INTERNAL FIXATION DEVICE, OPEN APPROACH: ICD-10-PCS | Performed by: PODIATRIST

## 2018-08-02 PROCEDURE — C1776 JOINT DEVICE (IMPLANTABLE): HCPCS | Performed by: PODIATRIST

## 2018-08-02 PROCEDURE — 36000063 ZZH SURGERY LEVEL 4 EA 15 ADDTL MIN: Performed by: PODIATRIST

## 2018-08-02 PROCEDURE — A9270 NON-COVERED ITEM OR SERVICE: HCPCS | Mod: GY | Performed by: PODIATRIST

## 2018-08-02 PROCEDURE — 71000012 ZZH RECOVERY PHASE 1 LEVEL 1 FIRST HR: Performed by: PODIATRIST

## 2018-08-02 PROCEDURE — 36000065 ZZH SURGERY LEVEL 4 W FLUORO 1ST 30 MIN: Performed by: PODIATRIST

## 2018-08-02 PROCEDURE — 0LSP0ZZ REPOSITION LEFT LOWER LEG TENDON, OPEN APPROACH: ICD-10-PCS | Performed by: PODIATRIST

## 2018-08-02 PROCEDURE — 27110028 ZZH OR GENERAL SUPPLY NON-STERILE: Performed by: PODIATRIST

## 2018-08-02 PROCEDURE — 37000009 ZZH ANESTHESIA TECHNICAL FEE, EACH ADDTL 15 MIN: Performed by: PODIATRIST

## 2018-08-02 PROCEDURE — 25000125 ZZHC RX 250: Performed by: PODIATRIST

## 2018-08-02 PROCEDURE — 40000305 ZZH STATISTIC PRE PROC ASSESS I: Performed by: PODIATRIST

## 2018-08-02 PROCEDURE — 27210794 ZZH OR GENERAL SUPPLY STERILE: Performed by: PODIATRIST

## 2018-08-02 PROCEDURE — 37000008 ZZH ANESTHESIA TECHNICAL FEE, 1ST 30 MIN: Performed by: PODIATRIST

## 2018-08-02 DEVICE — IMP KIT REPAIR LIGAMENT AUGMENTATION INT BRACE AR-1688-CP: Type: IMPLANTABLE DEVICE | Site: FOOT | Status: FUNCTIONAL

## 2018-08-02 DEVICE — IMP SCR ARTHREX CAN 4.0X34MM AR-8940-34: Type: IMPLANTABLE DEVICE | Site: FOOT | Status: FUNCTIONAL

## 2018-08-02 DEVICE — IMP SCR ARTHREX BLUE LOCKING 3.5X28MM AR-8935L-28: Type: IMPLANTABLE DEVICE | Site: FOOT | Status: FUNCTIONAL

## 2018-08-02 DEVICE — IMP SCR ARTHREX CORTICAL 3.5MMX28MM AR-8935-28: Type: IMPLANTABLE DEVICE | Site: FOOT | Status: FUNCTIONAL

## 2018-08-02 DEVICE — IMPLANTABLE DEVICE: Type: IMPLANTABLE DEVICE | Site: FOOT | Status: FUNCTIONAL

## 2018-08-02 RX ORDER — FENTANYL CITRATE 50 UG/ML
INJECTION, SOLUTION INTRAMUSCULAR; INTRAVENOUS PRN
Status: DISCONTINUED | OUTPATIENT
Start: 2018-08-02 | End: 2018-08-02

## 2018-08-02 RX ORDER — AMOXICILLIN 250 MG
1 CAPSULE ORAL 2 TIMES DAILY
Status: DISCONTINUED | OUTPATIENT
Start: 2018-08-02 | End: 2018-08-06 | Stop reason: HOSPADM

## 2018-08-02 RX ORDER — METOCLOPRAMIDE 10 MG/1
10 TABLET ORAL EVERY 6 HOURS PRN
Status: DISCONTINUED | OUTPATIENT
Start: 2018-08-02 | End: 2018-08-06 | Stop reason: HOSPADM

## 2018-08-02 RX ORDER — DEXAMETHASONE SODIUM PHOSPHATE 4 MG/ML
INJECTION, SOLUTION INTRA-ARTICULAR; INTRALESIONAL; INTRAMUSCULAR; INTRAVENOUS; SOFT TISSUE PRN
Status: DISCONTINUED | OUTPATIENT
Start: 2018-08-02 | End: 2018-08-02

## 2018-08-02 RX ORDER — ACETAMINOPHEN 325 MG/1
650 TABLET ORAL EVERY 4 HOURS PRN
Status: DISCONTINUED | OUTPATIENT
Start: 2018-08-05 | End: 2018-08-06 | Stop reason: HOSPADM

## 2018-08-02 RX ORDER — ROPIVACAINE HYDROCHLORIDE 7.5 MG/ML
INJECTION, SOLUTION EPIDURAL; PERINEURAL PRN
Status: DISCONTINUED | OUTPATIENT
Start: 2018-08-02 | End: 2018-08-02

## 2018-08-02 RX ORDER — HYDROMORPHONE HYDROCHLORIDE 1 MG/ML
.3-.5 INJECTION, SOLUTION INTRAMUSCULAR; INTRAVENOUS; SUBCUTANEOUS EVERY 10 MIN PRN
Status: DISCONTINUED | OUTPATIENT
Start: 2018-08-02 | End: 2018-08-02 | Stop reason: HOSPADM

## 2018-08-02 RX ORDER — SODIUM CHLORIDE, SODIUM LACTATE, POTASSIUM CHLORIDE, CALCIUM CHLORIDE 600; 310; 30; 20 MG/100ML; MG/100ML; MG/100ML; MG/100ML
INJECTION, SOLUTION INTRAVENOUS CONTINUOUS
Status: DISCONTINUED | OUTPATIENT
Start: 2018-08-02 | End: 2018-08-02 | Stop reason: HOSPADM

## 2018-08-02 RX ORDER — PROPOFOL 10 MG/ML
INJECTION, EMULSION INTRAVENOUS CONTINUOUS PRN
Status: DISCONTINUED | OUTPATIENT
Start: 2018-08-02 | End: 2018-08-02

## 2018-08-02 RX ORDER — ROPIVACAINE HYDROCHLORIDE 5 MG/ML
INJECTION, SOLUTION EPIDURAL; INFILTRATION; PERINEURAL PRN
Status: DISCONTINUED | OUTPATIENT
Start: 2018-08-02 | End: 2018-08-02

## 2018-08-02 RX ORDER — SODIUM CHLORIDE, SODIUM LACTATE, POTASSIUM CHLORIDE, CALCIUM CHLORIDE 600; 310; 30; 20 MG/100ML; MG/100ML; MG/100ML; MG/100ML
INJECTION, SOLUTION INTRAVENOUS CONTINUOUS
Status: DISCONTINUED | OUTPATIENT
Start: 2018-08-02 | End: 2018-08-03 | Stop reason: CLARIF

## 2018-08-02 RX ORDER — SIMVASTATIN 40 MG
40 TABLET ORAL AT BEDTIME
Status: DISCONTINUED | OUTPATIENT
Start: 2018-08-02 | End: 2018-08-06 | Stop reason: HOSPADM

## 2018-08-02 RX ORDER — MEPERIDINE HYDROCHLORIDE 50 MG/ML
12.5 INJECTION INTRAMUSCULAR; INTRAVENOUS; SUBCUTANEOUS
Status: DISCONTINUED | OUTPATIENT
Start: 2018-08-02 | End: 2018-08-02 | Stop reason: HOSPADM

## 2018-08-02 RX ORDER — NITROGLYCERIN 0.4 MG/1
0.4 TABLET SUBLINGUAL EVERY 5 MIN PRN
Status: DISCONTINUED | OUTPATIENT
Start: 2018-08-02 | End: 2018-08-06 | Stop reason: HOSPADM

## 2018-08-02 RX ORDER — ACETAMINOPHEN 325 MG/1
650 TABLET ORAL EVERY 4 HOURS PRN
Status: DISCONTINUED | OUTPATIENT
Start: 2018-08-05 | End: 2018-08-02

## 2018-08-02 RX ORDER — FLUTICASONE PROPIONATE 110 UG/1
2 AEROSOL, METERED RESPIRATORY (INHALATION) 2 TIMES DAILY
COMMUNITY

## 2018-08-02 RX ORDER — TRAZODONE HYDROCHLORIDE 100 MG/1
100 TABLET ORAL AT BEDTIME
Status: DISCONTINUED | OUTPATIENT
Start: 2018-08-02 | End: 2018-08-06 | Stop reason: HOSPADM

## 2018-08-02 RX ORDER — ONDANSETRON 4 MG/1
4 TABLET, ORALLY DISINTEGRATING ORAL EVERY 30 MIN PRN
Status: DISCONTINUED | OUTPATIENT
Start: 2018-08-02 | End: 2018-08-02 | Stop reason: HOSPADM

## 2018-08-02 RX ORDER — OXYCODONE HYDROCHLORIDE 5 MG/1
5-10 TABLET ORAL
Status: DISCONTINUED | OUTPATIENT
Start: 2018-08-02 | End: 2018-08-02

## 2018-08-02 RX ORDER — FENTANYL CITRATE 50 UG/ML
25-50 INJECTION, SOLUTION INTRAMUSCULAR; INTRAVENOUS
Status: DISCONTINUED | OUTPATIENT
Start: 2018-08-02 | End: 2018-08-02 | Stop reason: HOSPADM

## 2018-08-02 RX ORDER — OXYCODONE HYDROCHLORIDE 5 MG/1
5-10 TABLET ORAL
Status: DISCONTINUED | OUTPATIENT
Start: 2018-08-02 | End: 2018-08-06 | Stop reason: HOSPADM

## 2018-08-02 RX ORDER — GABAPENTIN 300 MG/1
300 CAPSULE ORAL 2 TIMES DAILY
Status: COMPLETED | OUTPATIENT
Start: 2018-08-02 | End: 2018-08-05

## 2018-08-02 RX ORDER — NALOXONE HYDROCHLORIDE 0.4 MG/ML
.1-.4 INJECTION, SOLUTION INTRAMUSCULAR; INTRAVENOUS; SUBCUTANEOUS
Status: DISCONTINUED | OUTPATIENT
Start: 2018-08-02 | End: 2018-08-02 | Stop reason: HOSPADM

## 2018-08-02 RX ORDER — LIDOCAINE HCL/EPINEPHRINE/PF 2%-1:200K
VIAL (ML) INJECTION PRN
Status: DISCONTINUED | OUTPATIENT
Start: 2018-08-02 | End: 2018-08-02

## 2018-08-02 RX ORDER — ACETAMINOPHEN 325 MG/1
975 TABLET ORAL EVERY 8 HOURS
Status: COMPLETED | OUTPATIENT
Start: 2018-08-02 | End: 2018-08-05

## 2018-08-02 RX ORDER — ALBUTEROL SULFATE 90 UG/1
2 AEROSOL, METERED RESPIRATORY (INHALATION) EVERY 6 HOURS PRN
Status: DISCONTINUED | OUTPATIENT
Start: 2018-08-02 | End: 2018-08-06 | Stop reason: HOSPADM

## 2018-08-02 RX ORDER — AMOXICILLIN 250 MG
2 CAPSULE ORAL 2 TIMES DAILY
Status: DISCONTINUED | OUTPATIENT
Start: 2018-08-02 | End: 2018-08-06 | Stop reason: HOSPADM

## 2018-08-02 RX ORDER — METOCLOPRAMIDE HYDROCHLORIDE 5 MG/ML
10 INJECTION INTRAMUSCULAR; INTRAVENOUS EVERY 6 HOURS PRN
Status: DISCONTINUED | OUTPATIENT
Start: 2018-08-02 | End: 2018-08-06 | Stop reason: HOSPADM

## 2018-08-02 RX ORDER — ONDANSETRON 2 MG/ML
4 INJECTION INTRAMUSCULAR; INTRAVENOUS EVERY 30 MIN PRN
Status: DISCONTINUED | OUTPATIENT
Start: 2018-08-02 | End: 2018-08-02 | Stop reason: HOSPADM

## 2018-08-02 RX ORDER — LIDOCAINE 40 MG/G
CREAM TOPICAL
Status: DISCONTINUED | OUTPATIENT
Start: 2018-08-02 | End: 2018-08-02 | Stop reason: HOSPADM

## 2018-08-02 RX ORDER — HYDROMORPHONE HYDROCHLORIDE 1 MG/ML
.3-.5 INJECTION, SOLUTION INTRAMUSCULAR; INTRAVENOUS; SUBCUTANEOUS
Status: DISCONTINUED | OUTPATIENT
Start: 2018-08-02 | End: 2018-08-06 | Stop reason: HOSPADM

## 2018-08-02 RX ORDER — KETOROLAC TROMETHAMINE 30 MG/ML
30 INJECTION, SOLUTION INTRAMUSCULAR; INTRAVENOUS EVERY 6 HOURS PRN
Status: DISCONTINUED | OUTPATIENT
Start: 2018-08-02 | End: 2018-08-02 | Stop reason: HOSPADM

## 2018-08-02 RX ORDER — CLONAZEPAM 0.5 MG/1
0.5 TABLET ORAL AT BEDTIME
Status: DISCONTINUED | OUTPATIENT
Start: 2018-08-02 | End: 2018-08-06 | Stop reason: HOSPADM

## 2018-08-02 RX ORDER — NALOXONE HYDROCHLORIDE 0.4 MG/ML
.1-.4 INJECTION, SOLUTION INTRAMUSCULAR; INTRAVENOUS; SUBCUTANEOUS
Status: DISCONTINUED | OUTPATIENT
Start: 2018-08-02 | End: 2018-08-06 | Stop reason: HOSPADM

## 2018-08-02 RX ORDER — LIDOCAINE 40 MG/G
CREAM TOPICAL
Status: DISCONTINUED | OUTPATIENT
Start: 2018-08-02 | End: 2018-08-06 | Stop reason: HOSPADM

## 2018-08-02 RX ORDER — ACETAMINOPHEN 325 MG/1
975 TABLET ORAL EVERY 8 HOURS
Status: DISCONTINUED | OUTPATIENT
Start: 2018-08-02 | End: 2018-08-02

## 2018-08-02 RX ADMIN — THROMBIN, TOPICAL (BOVINE) 5000 UNITS: KIT at 18:42

## 2018-08-02 RX ADMIN — FENTANYL CITRATE 50 MCG: 50 INJECTION, SOLUTION INTRAMUSCULAR; INTRAVENOUS at 18:34

## 2018-08-02 RX ADMIN — FENTANYL CITRATE 50 MCG: 50 INJECTION, SOLUTION INTRAMUSCULAR; INTRAVENOUS at 18:38

## 2018-08-02 RX ADMIN — SENNOSIDES AND DOCUSATE SODIUM 1 TABLET: 8.6; 5 TABLET ORAL at 21:19

## 2018-08-02 RX ADMIN — HYDROMORPHONE HYDROCHLORIDE 0.5 MG: 1 INJECTION, SOLUTION INTRAMUSCULAR; INTRAVENOUS; SUBCUTANEOUS at 19:36

## 2018-08-02 RX ADMIN — ACETAMINOPHEN 975 MG: 325 TABLET, FILM COATED ORAL at 21:18

## 2018-08-02 RX ADMIN — SODIUM CHLORIDE, POTASSIUM CHLORIDE, SODIUM LACTATE AND CALCIUM CHLORIDE: 600; 310; 30; 20 INJECTION, SOLUTION INTRAVENOUS at 18:35

## 2018-08-02 RX ADMIN — MIDAZOLAM 2 MG: 1 INJECTION INTRAMUSCULAR; INTRAVENOUS at 14:35

## 2018-08-02 RX ADMIN — GABAPENTIN 300 MG: 300 CAPSULE ORAL at 21:18

## 2018-08-02 RX ADMIN — FENTANYL CITRATE 100 MCG: 50 INJECTION, SOLUTION INTRAMUSCULAR; INTRAVENOUS at 17:29

## 2018-08-02 RX ADMIN — HYDROMORPHONE HYDROCHLORIDE 0.5 MG: 1 INJECTION, SOLUTION INTRAMUSCULAR; INTRAVENOUS; SUBCUTANEOUS at 19:22

## 2018-08-02 RX ADMIN — LIDOCAINE HYDROCHLORIDE,EPINEPHRINE BITARTRATE 5 ML: 20; .005 INJECTION, SOLUTION EPIDURAL; INFILTRATION; INTRACAUDAL; PERINEURAL at 14:48

## 2018-08-02 RX ADMIN — DEXAMETHASONE SODIUM PHOSPHATE 8 MG: 4 INJECTION, SOLUTION INTRA-ARTICULAR; INTRALESIONAL; INTRAMUSCULAR; INTRAVENOUS; SOFT TISSUE at 17:01

## 2018-08-02 RX ADMIN — SIMVASTATIN 40 MG: 40 TABLET, FILM COATED ORAL at 21:51

## 2018-08-02 RX ADMIN — FENTANYL CITRATE 100 MCG: 50 INJECTION, SOLUTION INTRAMUSCULAR; INTRAVENOUS at 17:01

## 2018-08-02 RX ADMIN — LIDOCAINE HYDROCHLORIDE 3 ML: 10 INJECTION, SOLUTION EPIDURAL; INFILTRATION; INTRACAUDAL; PERINEURAL at 14:45

## 2018-08-02 RX ADMIN — SODIUM CHLORIDE, POTASSIUM CHLORIDE, SODIUM LACTATE AND CALCIUM CHLORIDE: 600; 310; 30; 20 INJECTION, SOLUTION INTRAVENOUS at 13:30

## 2018-08-02 RX ADMIN — MIDAZOLAM 2 MG: 1 INJECTION INTRAMUSCULAR; INTRAVENOUS at 14:36

## 2018-08-02 RX ADMIN — ROPIVACAINE HYDROCHLORIDE 30 ML: 5 INJECTION, SOLUTION EPIDURAL; INFILTRATION; PERINEURAL at 14:50

## 2018-08-02 RX ADMIN — FENTANYL CITRATE 100 MCG: 50 INJECTION, SOLUTION INTRAMUSCULAR; INTRAVENOUS at 14:35

## 2018-08-02 RX ADMIN — LIDOCAINE HYDROCHLORIDE 5 ML: 10 INJECTION, SOLUTION EPIDURAL; INFILTRATION; INTRACAUDAL; PERINEURAL at 13:30

## 2018-08-02 RX ADMIN — ROPIVACAINE HYDROCHLORIDE 10 ML: 7.5 INJECTION, SOLUTION EPIDURAL; PERINEURAL at 14:49

## 2018-08-02 RX ADMIN — FENTANYL CITRATE 100 MCG: 50 INJECTION, SOLUTION INTRAMUSCULAR; INTRAVENOUS at 14:38

## 2018-08-02 RX ADMIN — CLONAZEPAM 0.5 MG: 0.5 TABLET ORAL at 21:51

## 2018-08-02 RX ADMIN — MIDAZOLAM 2 MG: 1 INJECTION INTRAMUSCULAR; INTRAVENOUS at 17:01

## 2018-08-02 RX ADMIN — TRAZODONE HYDROCHLORIDE 100 MG: 100 TABLET ORAL at 21:51

## 2018-08-02 RX ADMIN — PROPOFOL 175 MCG/KG/MIN: 10 INJECTION, EMULSION INTRAVENOUS at 17:01

## 2018-08-02 RX ADMIN — OXYCODONE HYDROCHLORIDE 5 MG: 5 TABLET ORAL at 21:51

## 2018-08-02 RX ADMIN — LIDOCAINE HYDROCHLORIDE 5 ML: 10 INJECTION, SOLUTION EPIDURAL; INFILTRATION; INTRACAUDAL; PERINEURAL at 17:01

## 2018-08-02 RX ADMIN — VANCOMYCIN HYDROCHLORIDE 1500 MG: 10 INJECTION, POWDER, LYOPHILIZED, FOR SOLUTION INTRAVENOUS at 16:20

## 2018-08-02 ASSESSMENT — ACTIVITIES OF DAILY LIVING (ADL)
DRESS: 0-->INDEPENDENT
TOILETING: 0-->INDEPENDENT
RETIRED_EATING: 0-->INDEPENDENT
RETIRED_COMMUNICATION: 0-->UNDERSTANDS/COMMUNICATES WITHOUT DIFFICULTY
TRANSFERRING: 1 - ASSISTIVE EQUIPMENT
BATHING: 0 - INDEPENDENT
NUMBER_OF_TIMES_PATIENT_HAS_FALLEN_WITHIN_LAST_SIX_MONTHS: 1
WHICH_OF_THE_ABOVE_FUNCTIONAL_RISKS_HAD_A_RECENT_ONSET_OR_CHANGE?: AMBULATION
TOILETING: 1 - ASSISTIVE EQUIPMENT
COGNITION: 0 - NO COGNITION ISSUES REPORTED
DRESS: 0 - INDEPENDENT
AMBULATION: 1 - ASSISTIVE EQUIPMENT
SWALLOWING: 0 - SWALLOWS FOODS/LIQUIDS WITHOUT DIFFICULTY
AMBULATION: 1-->ASSISTIVE EQUIPMENT
CHANGE_IN_FUNCTIONAL_STATUS_SINCE_ONSET_OF_CURRENT_ILLNESS/INJURY: NO
EATING: 0 - INDEPENDENT
TRANSFERRING: 0-->INDEPENDENT
SWALLOWING: 0-->SWALLOWS FOODS/LIQUIDS WITHOUT DIFFICULTY
FALL_HISTORY_WITHIN_LAST_SIX_MONTHS: YES
BATHING: 0-->INDEPENDENT
COMMUNICATION: 0 - UNDERSTANDS/COMMUNICATES WITHOUT DIFFICULTY

## 2018-08-02 ASSESSMENT — LIFESTYLE VARIABLES: TOBACCO_USE: 1

## 2018-08-02 ASSESSMENT — ENCOUNTER SYMPTOMS: SEIZURES: 1

## 2018-08-02 NOTE — ANESTHESIA PREPROCEDURE EVALUATION
Anesthesia Evaluation     . Pt has had prior anesthetic. Type: General and MAC           ROS/MED HX    ENT/Pulmonary:     (+)sleep apnea, tobacco use, uses CPAP , . .    Neurologic:     (+)seizures     Cardiovascular:     (+) Dyslipidemia, ----. : . . . :. .       METS/Exercise Tolerance:     Hematologic:  - neg hematologic  ROS       Musculoskeletal:   (+) , , other musculoskeletal- fibromyalgia      GI/Hepatic:  - neg GI/hepatic ROS       Renal/Genitourinary:  - ROS Renal section negative       Endo:     (+) type II DM .      Psychiatric:     (+) psychiatric history anxiety and depression      Infectious Disease:  - neg infectious disease ROS       Malignancy:         Other:                     Physical Exam  Normal systems: cardiovascular, pulmonary and dental    Airway   Mallampati: II  TM distance: >3 FB  Neck ROM: full    Dental     Cardiovascular       Pulmonary                     Anesthesia Plan      History & Physical Review  History and physical reviewed and following examination; no interval change.    ASA Status:  3 .    NPO Status:  > 8 hours    Plan for Periph. Nerve Block for postop pain, General and LMA with Intravenous and Propofol induction. Maintenance will be Balanced.    PONV prophylaxis:  Ondansetron (or other 5HT-3) and Dexamethasone or Solumedrol       Postoperative Care  Postoperative pain management:  IV analgesics, Oral pain medications and Peripheral nerve block (Single Shot).      Consents  Anesthetic plan, risks, benefits and alternatives discussed with:  Patient..                          .

## 2018-08-02 NOTE — IP AVS SNAPSHOT
"    [facility] SURGICAL: [phone]                                              INTERAGENCY TRANSFER FORM - PHYSICIAN ORDERS   2018                    Hospital Admission Date: 2018  NOEL MCKEON   : 1955  Sex: Female        Attending Provider: Mehdi Vidal DPM     Allergies:  Arthrotec, Aspirin, Bupropion, Codeine, Diagnostic X-ray Materials, Diazepam, Dye [Contrast Dye], Iodine, Latex, Meperidine, Midazolam, Midazolam Hcl, Other [Seasonal Allergies], Penicillins, Sulfa Drugs, Ziprasidone    Infection:  None   Service:  ORTHOPEDICS    Ht:  1.727 m (5' 8\")   Wt:  100.7 kg (222 lb 0.1 oz)   Admission Wt:  109.8 kg (242 lb)    BMI:  33.76 kg/m 2   BSA:  2.2 m 2            Patient PCP Information     Provider PCP Type    Gina Bhat MD General      ED Clinical Impression     Diagnosis Description Comment Added By Time Added    Status post osteotomy [Z98.890] Status post osteotomy [Z98.890]  Kita Matamoros PA-C 8/3/2018 11:35 AM      Hospital Problems as of 2018              Priority Class Noted POA    Diabetes mellitus, type 2 (H) Medium  3/28/2013 Yes    Dyslipidemia Medium  3/28/2013 Yes    Seasonal allergic rhinitis Medium  2013 Yes    Anxiety Medium  10/27/2015 Yes    Depression Medium  10/27/2015 Yes    Heart murmur Medium  8/3/2018 Yes    Bipolar I disorder (H) Medium  8/3/2018 Yes    Asthma Medium  8/3/2018 Yes    Hypothyroidism Medium  8/3/2018 Yes    Chronic idiopathic thrombocytopenic purpura (H) Medium  8/3/2018 Yes    Atypical chest pain Medium  8/3/2018 Yes    * (Principal)Post-operative state Medium  2018 Yes      Non-Hospital Problems as of 2018              Priority Class Noted    Advanced care planning/counseling discussion Medium  2012    Ear pain Medium  2013    TMJ (temporomandibular joint syndrome) Medium  2013    Vision changes Medium  2013    Orthostatic hypotension Medium  2013    Dizziness " Medium  9/3/2013    Epistaxis Medium  9/3/2013    Dysphonia Medium  7/18/2014    JOSE (obstructive sleep apnea) Medium  4/27/2015    Psychosis Medium  2/3/2016      Code Status History     Date Active Date Inactive Code Status Order ID Comments User Context    2/3/2016 11:40 AM 2/9/2016  1:13 PM Full Code [id number]  Edgar April KATYA Wilkins Inpatient         Medication Review      START taking        Dose / Directions Comments    acetaminophen 325 MG tablet   Commonly known as:  TYLENOL   Used for:  Status post osteotomy        Dose:  650 mg   Take 2 tablets (650 mg) by mouth every 4 hours as needed for mild pain   Quantity:  100 tablet   Refills:  0        oxyCODONE IR 5 MG tablet   Commonly known as:  ROXICODONE   Used for:  Status post osteotomy        Dose:  5-10 mg   Take 1-2 tablets (5-10 mg) by mouth every 4 hours as needed for other (pain control or improvement in physical function. Hold dose for analgesic side effects.)   Quantity:  40 tablet   Refills:  0        senna-docusate 8.6-50 MG per tablet   Commonly known as:  SENOKOT-S;PERICOLACE   Used for:  Status post osteotomy        Dose:  2 tablet   Take 2 tablets by mouth 2 times daily   Quantity:  100 tablet   Refills:  0          CONTINUE these medications which may have CHANGED, or have new prescriptions. If we are uncertain of the size of tablets/capsules you have at home, strength may be listed as something that might have changed.        Dose / Directions Comments    cholecalciferol 5000 units Caps   This may have changed:  how much to take   Used for:  Dyslipidemia        Dose:  5000 Units   Take 1 capsule (5,000 Units) by mouth daily   Refills:  0          CONTINUE these medications which have NOT CHANGED        Dose / Directions Comments    albuterol 108 (90 Base) MCG/ACT Inhaler   Commonly known as:  PROAIR HFA/PROVENTIL HFA/VENTOLIN HFA   Used for:  Mild intermittent reactive airway disease without complication        Dose:  2 puff   Inhale 2  puffs into the lungs every 6 hours as needed for shortness of breath / dyspnea   Quantity:  1 Inhaler   Refills:  1        EPINEPHrine 0.3 MG/0.3ML injection 2-pack   Commonly known as:  EPIPEN/ADRENACLICK/or ANY BX GENERIC EQUIV   Used for:  Perennial allergic rhinitis, Allergic reaction, subsequent encounter        Dose:  0.3 mg   Inject 0.3 mLs (0.3 mg) into the muscle once as needed   Quantity:  2 each   Refills:  1        fluticasone 110 MCG/ACT Inhaler   Commonly known as:  FLOVENT HFA        Dose:  2 puff   Inhale 2 puffs into the lungs 2 times daily   Refills:  0        fluticasone 27.5 MCG/SPRAY spray   Commonly known as:  VERAMYST   Used for:  Perennial allergic rhinitis        Dose:  2 spray   Spray 2 sprays into both nostrils daily   Quantity:  30 g   Refills:  12    Please Dispense #90 day supply       KLONOPIN PO        Dose:  0.5 mg   Take 0.5 mg by mouth At Bedtime   Refills:  0        LANCETS MISC.        Test 1 time daily   Refills:  0        LEVOTHYROXINE SODIUM PO        Dose:  50 mcg   Take 50 mcg by mouth daily   Refills:  0        loratadine 10 MG tablet   Commonly known as:  CLARITIN   Used for:  Perennial allergic rhinitis        Dose:  10 mg   Take 1 tablet (10 mg) by mouth daily   Quantity:  90 tablet   Refills:  3        NITROSTAT SL        Dose:  0.4 mg   Place 0.4 mg under the tongue every 5 minutes as needed for chest pain   Refills:  0        PRECISION XTRA GLUCOSE test strip   Generic drug:  blood glucose monitoring        Text 1 time by finger poke route every day   Refills:  0        PROBIOTIC FORMULA PO        Take  by mouth daily.      Refills:  0        SIMVASTATIN PO        Dose:  40 mg   Take 40 mg by mouth At Bedtime   Refills:  0        SUBLINGUAL IMMUNOTHERAPY (SLIT)   Used for:  Perennial allergic rhinitis        Continue SLIT, 1 drop three times daily, following standard maintenance protocol.   Quantity:  1 vial   Refills:  3    Written order from SUBHA Ramos, sent to  be scanned.       TRAZODONE HCL PO        Dose:  100 mg   Take 100 mg by mouth At Bedtime   Refills:  0                  Further instructions from your care team       Call tco tomorrow for follow up appointment with Dr Vidal in about 10 days. [phone]    Summary of Visit     Reason for your hospital stay       Foot reconstruction surgery             After Care     Activity       Your activity upon discharge:   Activity as tolerated, no driving until off narcotic pain medication. You may return to work when cleared by your orthopedic surgeon or physician assistant.  Non weight bearing on the affected extremity       Diet       Follow this diet upon discharge: Orders Placed This Encounter      Advance Diet as Tolerated: Regular Diet Adult         Wound care and dressings       Instructions to care for your wound at home: keep your splint clean, dry and intact until follow up.             Referrals     Home Care Referral       **Order classes of: [facility], [facility] and [facility] will route to the Home Care and Hospice Referral Pool.  Home Care or Hospice will then contact the patient to schedule their appointment.**    If you do not hear from Home Care and Hospice, or you would like to call to schedule, please call the referring place of service that your provider has listed below.  ______________________________________________________________________    Your provider has referred you to: [facility]    Extended Emergency Contact Information  Primary Emergency Contact: ZOYA MCKEON   [facility]  Home Phone: [phone]  Relation: Son  Secondary Emergency Contact: MCKEONISAIAS  Address: [address] [place] [address]           [place] [address] [facility]  Home Phone: [phone]  Relation: Spouse  Hearing or visual needs: Hearing Aid    Patient Anticipated Discharge Date: 8/6/2018   RN, PT, HHA to begin 24 - 48 hours after discharge.  PLEASE EVALUATE AND TREAT (Evaluation timeline is 24 - 48 hrs.  Please call if there is need for a variance to this timeline).    REASON FOR REFERRAL: Assessment & Treatment:  RN    ADDITIONAL SERVICES NEEDED: OT and HHA    OTHER PERTINENT INFORMATION: Patient was last seen by provider on 8/6/2018 for ankle reconstruction.    Current Outpatient Prescriptions:  acetaminophen (TYLENOL) 325 MG tablet, Take 2 tablets (650 mg) by mouth every 4 hours as needed for mild pain, Disp: 100 tablet, Rfl: 0  oxyCODONE IR (ROXICODONE) 5 MG tablet, Take 1-2 tablets (5-10 mg) by mouth every 4 hours as needed for other (pain control or improvement in physical function. Hold dose for analgesic side effects.), Disp: 40 tablet, Rfl: 0  senna-docusate (SENOKOT-S;PERICOLACE) 8.6-50 MG per tablet, Take 2 tablets by mouth 2 times daily, Disp: 100 tablet, Rfl:       Patient Active Problem List:     Diabetes mellitus, type 2 (H)     Dyslipidemia     Ear pain     TMJ (temporomandibular joint syndrome)     Seasonal allergic rhinitis     Vision changes     Orthostatic hypotension     Advanced care planning/counseling discussion     Dizziness     Epistaxis     Dysphonia     JOSE (obstructive sleep apnea)     Psychosis     Anxiety     Depression     Heart murmur     Bipolar I disorder (H)     Asthma     Hypothyroidism     Chronic idiopathic thrombocytopenic purpura (H)     Atypical chest pain     Post-operative state      Documentation of Face to Face and Certification for Home Health Services    I certify that patient, Mattie Carranza is under my care and that I, or a Nurse Practitioner or Physician's Assistant working with me, had a face-to-face encounter that meets the physician face-to-face encounter requirements with this patient on: 8/6/2018.    This encounter with the patient was in whole, or in part, for the following medical condition, which is the primary reason for Home Health Care: ankle reconstruction.    I certify that, based on my findings, the following services are medically necessary Home  Health Services: Nursing and Occupational Therapy    My clinical findings support the need for the above services because: Nurse is needed: To provide assessment and oversight required in the home to assure adherence to the medical plan due to: ankle reconstruction..    Further, I certify that my clinical findings support that this patient is homebound (i.e. absences from home require considerable and taxing effort and are for medical reasons or [employer] services or infrequently or of short duration when for other reasons) because: Requires assistance of another person or specialized equipment to access medical services because patient: Requires supervision of another for safe transfer..    Based on the above findings, I certify that this patient is confined to the home and needs intermittent skilled nursing care, physical therapy and/or speech therapy.  The patient is under my care, and I have initiated the establishment of the plan of care.  This patient will be followed by a physician who will periodically review the plan of care.    Physician/Provider to provide follow up care: Gina Howard certified Physician at time of discharge: Mehdi Vidal    Please be aware that coverage of these services is subject to the terms and limitations of your health insurance plan.  Call member services at your health plan with any benefit or coverage questions.             Follow-Up Appointment Instructions     Future Labs/Procedures    Follow-up and recommended labs and tests     Comments:    Follow up with  Dr. Juancarlos Vidal at  [facility], within 2 weeks to evaluate after surgery and for hospital follow- up.      Follow-Up Appointment Instructions     Follow-up and recommended labs and tests       Follow up with  Dr. Juancarlos Vidal at  [facility], within 2 weeks to evaluate after surgery and for hospital follow- up.             Statement of Approval     Ordered           08/06/18 0369  I have reviewed and agree with all the recommendations and orders detailed in this document.  EFFECTIVE NOW     Approved and electronically signed by:  Mehdi Vidal DPM

## 2018-08-02 NOTE — ANESTHESIA PROCEDURE NOTES
Peripheral nerve/Neuraxial procedure note : sciatic, saphenous and popliteal  Pre-Procedure  Performed by  NICKO GONSALES   Location: pre-op    Procedure Times:8/2/2018 2:34 PM and 8/2/2018 2:53 PM  Pre-Anesthestic Checklist: patient identified, IV checked, site marked, risks and benefits discussed, informed consent, monitors and equipment checked, pre-op evaluation, at physician/surgeon's request and post-op pain management    Timeout  Correct Patient: Yes   Correct Procedure: Yes   Correct Site: Yes   Correct Laterality: Yes   Correct Position: Yes   Site Marked: Yes   .   Procedure Documentation    .    Procedure:  left  Sciatic, saphenous and popliteal.  Local skin infiltrated with mL of 1% lidocaine.     Ultrasound used to identify targeted nerve, plexus, or vascular marker and placed a needle adjacent to it., Ultrasound was used to visualize the spread of the anesthetic in close proximity to the above stated nerve. A permanent image is entered into the patient's record.  Patient Prep;mask, sterile gloves, chlorhexidine gluconate and isopropyl alcohol, patient draped.  Nerve Stim: Initial Level 1 mA.  Lowest motor response 0.44 mA..  Needle: insulated, short bevel Needle Gauge: 20.    Needle Length (Inches) 4  Insertion Method: Single Shot.       Assessment/Narrative  Paresthesias: No.  Injection made incrementally with aspirations every 5 mL..  The placement was negative for: blood aspirated, painful injection and site bleeding.  Bolus given via needle. No blood aspirated via catheter.   Secured via.   Complications: none. Test dose of 5 mL lidocaine 2% w/ 1:200,000 epinephrine at 14:48.  Test dose negative for signs of intravascular, subdural or intrathecal injection. Comments:  Total volume injected at Sciatic nerve:30    Total volume injected at Saphenous Nerve:10

## 2018-08-02 NOTE — LETTER
Transition Communication Hand-off for Care Transitions to Next Level of Care Provider    Name: Mattie Carranza  : 1955  MRN #: [id number]  Primary Care Provider: Gina Bhat     Primary Clinic: [address] [address]     Reason for Hospitalization:  Status post foot surgery, unspecified laterality [Z98.890]  Admit Date/Time: 2018 12:43 PM  Discharge Date: 18  Payor Source: Payor: MEDICARE / Plan: MEDICARE / Product Type: Medicare /     Readmission Assessment Measure (GISELE) Risk Score/category: low           Reason for Communication Hand-off Referral: Fragility    Discharge Plan:home care       Concern for non-adherence with plan of care:   Y/N no  Discharge Needs Assessment:  Needs       Most Recent Value    Anticipated Changes Related to Illness none    Equipment Currently Used at Home none    Transportation Available family or friend will provide          Follow-up specialty is recommended: No    Follow-up plan:  No future appointments.          Key Recommendations:  Pt will dc home today with [facility]. Goal is for pt to remain at home.     Carlota Cobb MSW, LICSW, [phone]    AVS/Discharge Summary is the source of truth; this is a helpful guide for improved communication of patient story

## 2018-08-02 NOTE — IP AVS SNAPSHOT
[facility]    [address] [address]    Phone:  [phone]    Fax:  [phone]                                       After Visit Summary   8/2/2018    Mattie Carranza    MRN: [id number]           After Visit Summary Signature Page     I have received my discharge instructions, and my questions have been answered. I have discussed any challenges I see with this plan with the nurse or doctor.    ..........................................................................................................................................  Patient/Patient Representative Signature      ..........................................................................................................................................  Patient Representative Print Name and Relationship to Patient    ..................................................               ................................................  Date                                            Time    ..........................................................................................................................................  Reviewed by Signature/Title    ...................................................              ..............................................  Date                                                            Time

## 2018-08-02 NOTE — IP AVS SNAPSHOT
MRN:[id number]                      After Visit Summary   8/2/2018    Mattie Carranza    MRN: [id number]           Thank you!     Thank you for choosing [facility] for your care. Our goal is always to provide you with excellent care. Hearing back from our patients is one way we can continue to improve our services. Please take a few minutes to complete the written survey that you may receive in the mail after you visit with us. Thank you!        Patient Information     Date Of Birth          1955        Designated Caregiver       Most Recent Value    Caregiver    Will someone help with your care after discharge? yes    Name of designated caregiver Clarence    Phone number of caregiver [phone]    Caregiver [facility]      About your hospital stay     You were admitted on:  August 2, 2018 You last received care in the:  [facility]    You were discharged on:  August 6, 2018        Reason for your hospital stay       Foot reconstruction surgery                  Who to Call     For medical emergencies, please call 911.  For non-urgent questions about your medical care, please call your primary care provider or clinic, [phone]  For questions related to your surgery, please call your surgery clinic        Attending Provider     Provider Mehdi Dinh DPM Podiatry       Primary Care Provider Office Phone # Fax #    Gina Bhat [phone]       When to contact your care team       Call your Orthopedic surgeon at [facility]  if you have any of the following: temperature greater than 100.4,  increased shortness of breath, increased drainage, increased swelling or increased pain.                  After Care Instructions     Activity       Your activity upon discharge:   Activity as tolerated, no driving until off narcotic pain medication. You may return to work when cleared by your orthopedic surgeon or  physician assistant.  Non weight bearing on the affected extremity            Diet       Follow this diet upon discharge: Orders Placed This Encounter      Advance Diet as Tolerated: Regular Diet Adult              Wound care and dressings       Instructions to care for your wound at home: keep your splint clean, dry and intact until follow up.                  Follow-up Appointments     Follow-up and recommended labs and tests       Follow up with  Dr. Juancarlos Vidal at  [facility], within 2 weeks to evaluate after surgery and for hospital follow- up.                  Additional Services     Home Care Referral       **Order classes of: [facility], [facility] and [facility] will route to the Home Care and Hospice Referral Pool.  Home Care or Hospice will then contact the patient to schedule their appointment.**    If you do not hear from Home Care and Hospice, or you would like to call to schedule, please call the referring place of service that your provider has listed below.  ______________________________________________________________________    Your provider has referred you to: [facility]    Extended Emergency Contact Information  Primary Emergency Contact: ZOYA MCKEON   [facility]  Home Phone: [phone]  Relation: Son  Secondary Emergency Contact: ISAIAS MCKEON  Address: [address] [place] [address] [id number] [place] [address]  Home Phone: [phone]  Relation: Spouse  Hearing or visual needs: Hearing Aid    Patient Anticipated Discharge Date: 8/6/2018   RN, PT, HHA to begin 24 - 48 hours after discharge.  PLEASE EVALUATE AND TREAT (Evaluation timeline is 24 - 48 hrs. Please call if there is need for a variance to this timeline).    REASON FOR REFERRAL: Assessment & Treatment:  RN    ADDITIONAL SERVICES NEEDED: OT and HHA    OTHER PERTINENT INFORMATION: Patient was last seen by provider on 8/6/2018 for ankle reconstruction.    Current Outpatient Prescriptions:  acetaminophen  (TYLENOL) 325 MG tablet, Take 2 tablets (650 mg) by mouth every 4 hours as needed for mild pain, Disp: 100 tablet, Rfl: 0  oxyCODONE IR (ROXICODONE) 5 MG tablet, Take 1-2 tablets (5-10 mg) by mouth every 4 hours as needed for other (pain control or improvement in physical function. Hold dose for analgesic side effects.), Disp: 40 tablet, Rfl: 0  senna-docusate (SENOKOT-S;PERICOLACE) 8.6-50 MG per tablet, Take 2 tablets by mouth 2 times daily, Disp: 100 tablet, Rfl:       Patient Active Problem List:     Diabetes mellitus, type 2 (H)     Dyslipidemia     Ear pain     TMJ (temporomandibular joint syndrome)     Seasonal allergic rhinitis     Vision changes     Orthostatic hypotension     Advanced care planning/counseling discussion     Dizziness     Epistaxis     Dysphonia     JOSE (obstructive sleep apnea)     Psychosis     Anxiety     Depression     Heart murmur     Bipolar I disorder (H)     Asthma     Hypothyroidism     Chronic idiopathic thrombocytopenic purpura (H)     Atypical chest pain     Post-operative state      Documentation of Face to Face and Certification for Home Health Services    I certify that patient, Mattie Carranza is under my care and that I, or a Nurse Practitioner or Physician's Assistant working with me, had a face-to-face encounter that meets the physician face-to-face encounter requirements with this patient on: 8/6/2018.    This encounter with the patient was in whole, or in part, for the following medical condition, which is the primary reason for Home Health Care: ankle reconstruction.    I certify that, based on my findings, the following services are medically necessary Home Health Services: Nursing and Occupational Therapy    My clinical findings support the need for the above services because: Nurse is needed: To provide assessment and oversight required in the home to assure adherence to the medical plan due to: ankle reconstruction..    Further, I certify that my clinical findings  support that this patient is homebound (i.e. absences from home require considerable and taxing effort and are for medical reasons or Yazidi services or infrequently or of short duration when for other reasons) because: Requires assistance of another person or specialized equipment to access medical services because patient: Requires supervision of another for safe transfer..    Based on the above findings, I certify that this patient is confined to the home and needs intermittent skilled nursing care, physical therapy and/or speech therapy.  The patient is under my care, and I have initiated the establishment of the plan of care.  This patient will be followed by a physician who will periodically review the plan of care.    Physician/Provider to provide follow up care: Gina Howard    [facility] certified Physician at time of discharge: Mehdi Vidal    Please be aware that coverage of these services is subject to the terms and limitations of your health insurance plan.  Call member services at your health plan with any benefit or coverage questions.                  Further instructions from your care team       Call tco tomorrow for follow up appointment with Dr Vidal in about 10 days. [phone]    Pending Results     Date and Time Order Name Status Description    8/3/2018 1137 EKG 12-LEAD, TRACING ONLY In process     8/2/2018 0627 XR Surgery SABA Fluoro L/T 5 Min w Stills In process             Statement of Approval     Ordered          08/06/18 1420  I have reviewed and agree with all the recommendations and orders detailed in this document.  EFFECTIVE NOW     Approved and electronically signed by:  Mehdi Vidal DPM             Admission Information     Date & Time Provider Department Dept. Phone    8/2/2018 Mehdi Vidal DPM [facility] [phone]      Your Vitals Were     Blood Pressure Pulse Temperature Respirations Height Weight    161/79 (BP  "Location: Left arm) 57 99.1  F (37.3  C) (Oral) 18 1.727 m (5' 8\") 100.7 kg (222 lb 0.1 oz)    Pulse Oximetry BMI (Body Mass Index)                99% 33.76 kg/m2          [employer] Information     [employer] lets you send messages to your doctor, view your test results, renew your prescriptions, schedule appointments and more. To sign up, go to [email or web address] . Click on \"Log in\" on the left side of the screen, which will take you to the Welcome page. Then click on \"Sign up Now\" on the right side of the page.     You will be asked to enter the access code listed below, as well as some personal information. Please follow the directions to create your username and password.     Your access code is: [email or web address]  Expires: 2018  2:37 PM     Your access code will  in 90 days. If you need help or a new code, please call your [facility] clinic or [phone].        Care EveryWhere ID     This is your Care EveryWhere ID. This could be used by other organizations to access your [facility] medical records  [id number]        Equal Access to Services     SYLVIA ALVA AH: Carmela Martinez, wajadeda azeem, qaybta kaalmada adetessayada, chay ramirez. So [facility] [phone].    ATENCIÓN: Si habla español, tiene a campos disposición servicios gratuitos de asistencia lingüística. Llame al [phone].    We comply with applicable federal civil rights laws and [place] laws. We do not discriminate on the basis of race, color, national origin, age, disability, sex, sexual orientation, or gender identity.               Review of your medicines      START taking        Dose / Directions    acetaminophen 325 MG tablet   Commonly known as:  TYLENOL   Used for:  Status post osteotomy        Dose:  650 mg   Take 2 tablets (650 mg) by mouth every 4 hours as needed for mild pain   Quantity:  100 tablet   Refills:  0       oxyCODONE IR 5 MG tablet   Commonly known as:  ROXICODONE   Used for:  " Status post osteotomy        Dose:  5-10 mg   Take 1-2 tablets (5-10 mg) by mouth every 4 hours as needed for other (pain control or improvement in physical function. Hold dose for analgesic side effects.)   Quantity:  40 tablet   Refills:  0       senna-docusate 8.6-50 MG per tablet   Commonly known as:  SENOKOT-S;PERICOLACE   Used for:  Status post osteotomy        Dose:  2 tablet   Take 2 tablets by mouth 2 times daily   Quantity:  100 tablet   Refills:  0         CONTINUE these medicines which may have CHANGED, or have new prescriptions. If we are uncertain of the size of tablets/capsules you have at home, strength may be listed as something that might have changed.        Dose / Directions    cholecalciferol 5000 units Caps   This may have changed:  how much to take   Used for:  Dyslipidemia        Dose:  5000 Units   Take 1 capsule (5,000 Units) by mouth daily   Refills:  0         CONTINUE these medicines which have NOT CHANGED        Dose / Directions    albuterol 108 (90 Base) MCG/ACT Inhaler   Commonly known as:  PROAIR HFA/PROVENTIL HFA/VENTOLIN HFA   Used for:  Mild intermittent reactive airway disease without complication        Dose:  2 puff   Inhale 2 puffs into the lungs every 6 hours as needed for shortness of breath / dyspnea   Quantity:  1 Inhaler   Refills:  1       EPINEPHrine 0.3 MG/0.3ML injection 2-pack   Commonly known as:  EPIPEN/ADRENACLICK/or ANY BX GENERIC EQUIV   Used for:  Perennial allergic rhinitis, Allergic reaction, subsequent encounter        Dose:  0.3 mg   Inject 0.3 mLs (0.3 mg) into the muscle once as needed   Quantity:  2 each   Refills:  1       fluticasone 110 MCG/ACT Inhaler   Commonly known as:  FLOVENT HFA        Dose:  2 puff   Inhale 2 puffs into the lungs 2 times daily   Refills:  0       fluticasone 27.5 MCG/SPRAY spray   Commonly known as:  VERAMYST   Used for:  Perennial allergic rhinitis        Dose:  2 spray   Spray 2 sprays into both nostrils daily   Quantity:   30 g   Refills:  12       KLONOPIN PO        Dose:  0.5 mg   Take 0.5 mg by mouth At Bedtime   Refills:  0       LANCETS MISC.        Test 1 time daily   Refills:  0       LEVOTHYROXINE SODIUM PO        Dose:  50 mcg   Take 50 mcg by mouth daily   Refills:  0       loratadine 10 MG tablet   Commonly known as:  CLARITIN   Used for:  Perennial allergic rhinitis        Dose:  10 mg   Take 1 tablet (10 mg) by mouth daily   Quantity:  90 tablet   Refills:  3       NITROSTAT SL        Dose:  0.4 mg   Place 0.4 mg under the tongue every 5 minutes as needed for chest pain   Refills:  0       PRECISION XTRA GLUCOSE test strip   Generic drug:  blood glucose monitoring        Text 1 time by finger poke route every day   Refills:  0       PROBIOTIC FORMULA PO        Take  by mouth daily.      Refills:  0       SIMVASTATIN PO        Dose:  40 mg   Take 40 mg by mouth At Bedtime   Refills:  0       SUBLINGUAL IMMUNOTHERAPY (SLIT)   Used for:  Perennial allergic rhinitis        Continue SLIT, 1 drop three times daily, following standard maintenance protocol.   Quantity:  1 vial   Refills:  3       TRAZODONE HCL PO        Dose:  100 mg   Take 100 mg by mouth At Bedtime   Refills:  0            Where to get your medicines      These medications were sent to [facility] [place], [place] - [address]  [address] [address]     Phone:  [phone]     acetaminophen 325 MG tablet         Some of these will need a paper prescription and others can be bought over the counter. Ask your nurse if you have questions.     Bring a paper prescription for each of these medications     oxyCODONE IR 5 MG tablet       You don't need a prescription for these medications     senna-docusate 8.6-50 MG per tablet                Protect others around you: Learn how to safely use, store and throw away your medicines at www.disposemymeds.org.        Information about OPIOIDS     PRESCRIPTION OPIOIDS: WHAT YOU NEED TO KNOW    We gave you an opioid (narcotic) pain medicine. It is important to manage your pain, but opioids are not always the best choice. You should first try all the other options your care team gave you. Take this medicine for as short a time (and as few doses) as possible.     These medicines have risks:    DO NOT drive when on new or higher doses of pain medicine. These medicines can affect your alertness and reaction times, and you could be arrested for driving under the influence (DUI). If you need to use opioids long-term, talk to your care team about driving.    DO NOT operate heave machinery    DO NOT do any other dangerous activities while taking these medicines.     DO NOT drink any alcohol while taking these medicines.      If the opioid prescribed includes acetaminophen, DO NOT take with any other medicines that contain acetaminophen. Read all labels carefully. Look for the word  acetaminophen  or  Tylenol.  Ask your pharmacist if you have questions or are unsure.    You can get addicted to pain medicines, especially if you have a history of addiction (chemical, alcohol or substance dependence). Talk to your care team about ways to reduce this risk.    Store your pills in a secure place, locked if possible. We will not replace any lost or stolen medicine. If you don t finish your medicine, please throw away (dispose) as directed by your pharmacist. The [place] Pollution Control Agency has more information about safe disposal: https://www.pca.[facility].[place].us/living-green/managing-unwanted-medications.     All opioids tend to cause constipation. Drink plenty of water and eat foods that have a lot of fiber, such as fruits, vegetables, prune juice, apple juice and high-fiber cereal. Take a laxative (Miralax, milk of magnesia, Colace, Senna) if you don t move your bowels at least every other day.              Medication List: This is a list of all your medications and when to take them. Check marks below indicate  your daily home schedule. Keep this list as a reference.      Medications           Morning Afternoon Evening Bedtime As Needed    acetaminophen 325 MG tablet   Commonly known as:  TYLENOL   Take 2 tablets (650 mg) by mouth every 4 hours as needed for mild pain   Last time this was given:  650 mg on 8/6/2018  7:47 AM                                albuterol 108 (90 Base) MCG/ACT Inhaler   Commonly known as:  PROAIR HFA/PROVENTIL HFA/VENTOLIN HFA   Inhale 2 puffs into the lungs every 6 hours as needed for shortness of breath / dyspnea                                cholecalciferol 5000 units Caps   Take 1 capsule (5,000 Units) by mouth daily                                EPINEPHrine 0.3 MG/0.3ML injection 2-pack   Commonly known as:  EPIPEN/ADRENACLICK/or ANY BX GENERIC EQUIV   Inject 0.3 mLs (0.3 mg) into the muscle once as needed                                fluticasone 110 MCG/ACT Inhaler   Commonly known as:  FLOVENT HFA   Inhale 2 puffs into the lungs 2 times daily                                fluticasone 27.5 MCG/SPRAY spray   Commonly known as:  VERAMYST   Spray 2 sprays into both nostrils daily                                KLONOPIN PO   Take 0.5 mg by mouth At Bedtime   Last time this was given:  0.5 mg on 8/5/2018 11:03 PM                                LANCETS MISC.   Test 1 time daily                                LEVOTHYROXINE SODIUM PO   Take 50 mcg by mouth daily                                loratadine 10 MG tablet   Commonly known as:  CLARITIN   Take 1 tablet (10 mg) by mouth daily                                NITROSTAT SL   Place 0.4 mg under the tongue every 5 minutes as needed for chest pain                                oxyCODONE IR 5 MG tablet   Commonly known as:  ROXICODONE   Take 1-2 tablets (5-10 mg) by mouth every 4 hours as needed for other (pain control or improvement in physical function. Hold dose for analgesic side effects.)   Last time this was given:  5 mg on 8/6/2018   2:00 PM                                PRECISION XTRA GLUCOSE test strip   Text 1 time by finger poke route every day   Generic drug:  blood glucose monitoring                                PROBIOTIC FORMULA PO   Take  by mouth daily.                                   senna-docusate 8.6-50 MG per tablet   Commonly known as:  SENOKOT-S;PERICOLACE   Take 2 tablets by mouth 2 times daily   Last time this was given:  2 tablets on 8/6/2018  7:46 AM                                SIMVASTATIN PO   Take 40 mg by mouth At Bedtime   Last time this was given:  40 mg on 8/5/2018  8:53 PM                                SUBLINGUAL IMMUNOTHERAPY (SLIT)   Continue SLIT, 1 drop three times daily, following standard maintenance protocol.                                TRAZODONE HCL PO   Take 100 mg by mouth At Bedtime   Last time this was given:  100 mg on 8/5/2018 11:03 PM

## 2018-08-03 ENCOUNTER — APPOINTMENT (OUTPATIENT)
Dept: PHYSICAL THERAPY | Facility: CLINIC | Age: 63
DRG: 501 | End: 2018-08-03
Attending: PODIATRIST
Payer: MEDICARE

## 2018-08-03 PROBLEM — R07.89 ATYPICAL CHEST PAIN: Status: ACTIVE | Noted: 2018-08-03

## 2018-08-03 PROBLEM — D69.3 CHRONIC IDIOPATHIC THROMBOCYTOPENIC PURPURA (H): Status: ACTIVE | Noted: 2018-08-03

## 2018-08-03 PROBLEM — J45.909 ASTHMA: Status: ACTIVE | Noted: 2018-08-03

## 2018-08-03 PROBLEM — F31.9 BIPOLAR I DISORDER (H): Status: ACTIVE | Noted: 2018-08-03

## 2018-08-03 LAB
BASOPHILS # BLD AUTO: 0 10E9/L (ref 0–0.2)
BASOPHILS NFR BLD AUTO: 0.1 %
DIFFERENTIAL METHOD BLD: ABNORMAL
EOSINOPHIL # BLD AUTO: 0 10E9/L (ref 0–0.7)
EOSINOPHIL NFR BLD AUTO: 0 %
ERYTHROCYTE [DISTWIDTH] IN BLOOD BY AUTOMATED COUNT: 11.9 % (ref 10–15)
GLUCOSE SERPL-MCNC: 140 MG/DL (ref 70–99)
HCT VFR BLD AUTO: 41.1 % (ref 35–47)
HGB BLD-MCNC: 13.4 G/DL (ref 11.7–15.7)
IMM GRANULOCYTES # BLD: 0.1 10E9/L (ref 0–0.4)
IMM GRANULOCYTES NFR BLD: 0.6 %
LYMPHOCYTES # BLD AUTO: 0.8 10E9/L (ref 0.8–5.3)
LYMPHOCYTES NFR BLD AUTO: 7.2 %
MCH RBC QN AUTO: 30.2 PG (ref 26.5–33)
MCHC RBC AUTO-ENTMCNC: 32.6 G/DL (ref 31.5–36.5)
MCV RBC AUTO: 93 FL (ref 78–100)
MONOCYTES # BLD AUTO: 0.3 10E9/L (ref 0–1.3)
MONOCYTES NFR BLD AUTO: 3 %
NEUTROPHILS # BLD AUTO: 9.9 10E9/L (ref 1.6–8.3)
NEUTROPHILS NFR BLD AUTO: 89.1 %
NRBC # BLD AUTO: 0 10*3/UL
NRBC BLD AUTO-RTO: 0 /100
PLATELET # BLD AUTO: 130 10E9/L (ref 150–450)
RBC # BLD AUTO: 4.44 10E12/L (ref 3.8–5.2)
TROPONIN I SERPL-MCNC: <0.015 UG/L (ref 0–0.04)
WBC # BLD AUTO: 11.2 10E9/L (ref 4–11)

## 2018-08-03 PROCEDURE — 85025 COMPLETE CBC W/AUTO DIFF WBC: CPT | Performed by: PODIATRIST

## 2018-08-03 PROCEDURE — 25000132 ZZH RX MED GY IP 250 OP 250 PS 637: Mod: GY | Performed by: FAMILY MEDICINE

## 2018-08-03 PROCEDURE — 25000132 ZZH RX MED GY IP 250 OP 250 PS 637: Mod: GY | Performed by: PODIATRIST

## 2018-08-03 PROCEDURE — A9270 NON-COVERED ITEM OR SERVICE: HCPCS | Mod: GY | Performed by: PODIATRIST

## 2018-08-03 PROCEDURE — 25000125 ZZHC RX 250: Performed by: PHYSICIAN ASSISTANT

## 2018-08-03 PROCEDURE — 99233 SBSQ HOSP IP/OBS HIGH 50: CPT | Performed by: PHYSICIAN ASSISTANT

## 2018-08-03 PROCEDURE — A9270 NON-COVERED ITEM OR SERVICE: HCPCS | Mod: GY | Performed by: PHYSICIAN ASSISTANT

## 2018-08-03 PROCEDURE — 97161 PT EVAL LOW COMPLEX 20 MIN: CPT | Mod: GP

## 2018-08-03 PROCEDURE — 97110 THERAPEUTIC EXERCISES: CPT | Mod: GP

## 2018-08-03 PROCEDURE — 25000128 H RX IP 250 OP 636: Performed by: PODIATRIST

## 2018-08-03 PROCEDURE — 25000132 ZZH RX MED GY IP 250 OP 250 PS 637: Mod: GY | Performed by: PHYSICIAN ASSISTANT

## 2018-08-03 PROCEDURE — 36415 COLL VENOUS BLD VENIPUNCTURE: CPT | Performed by: PHYSICIAN ASSISTANT

## 2018-08-03 PROCEDURE — 97530 THERAPEUTIC ACTIVITIES: CPT | Mod: GP

## 2018-08-03 PROCEDURE — 82947 ASSAY GLUCOSE BLOOD QUANT: CPT | Performed by: PODIATRIST

## 2018-08-03 PROCEDURE — 93005 ELECTROCARDIOGRAM TRACING: CPT

## 2018-08-03 PROCEDURE — 84484 ASSAY OF TROPONIN QUANT: CPT | Performed by: PHYSICIAN ASSISTANT

## 2018-08-03 PROCEDURE — 85018 HEMOGLOBIN: CPT | Performed by: PODIATRIST

## 2018-08-03 PROCEDURE — 36415 COLL VENOUS BLD VENIPUNCTURE: CPT | Performed by: PODIATRIST

## 2018-08-03 PROCEDURE — 94640 AIRWAY INHALATION TREATMENT: CPT

## 2018-08-03 PROCEDURE — 25000128 H RX IP 250 OP 636: Performed by: PHYSICIAN ASSISTANT

## 2018-08-03 PROCEDURE — 40000193 ZZH STATISTIC PT WARD VISIT

## 2018-08-03 RX ORDER — ACETAMINOPHEN 325 MG/1
650 TABLET ORAL EVERY 4 HOURS PRN
Qty: 100 TABLET | Refills: 0 | Status: SHIPPED | OUTPATIENT
Start: 2018-08-05

## 2018-08-03 RX ORDER — ALBUTEROL SULFATE 0.83 MG/ML
2.5 SOLUTION RESPIRATORY (INHALATION) EVERY 6 HOURS PRN
Status: DISCONTINUED | OUTPATIENT
Start: 2018-08-03 | End: 2018-08-06 | Stop reason: HOSPADM

## 2018-08-03 RX ORDER — PROCHLORPERAZINE 25 MG
25 SUPPOSITORY, RECTAL RECTAL EVERY 12 HOURS PRN
Status: DISCONTINUED | OUTPATIENT
Start: 2018-08-03 | End: 2018-08-06 | Stop reason: HOSPADM

## 2018-08-03 RX ORDER — NICOTINE 21 MG/24HR
1 PATCH, TRANSDERMAL 24 HOURS TRANSDERMAL DAILY
Status: DISCONTINUED | OUTPATIENT
Start: 2018-08-03 | End: 2018-08-06 | Stop reason: HOSPADM

## 2018-08-03 RX ORDER — AMOXICILLIN 250 MG
2 CAPSULE ORAL 2 TIMES DAILY
Qty: 100 TABLET | COMMUNITY
Start: 2018-08-03 | End: 2019-10-08

## 2018-08-03 RX ORDER — OXYCODONE HYDROCHLORIDE 5 MG/1
5-10 TABLET ORAL EVERY 4 HOURS PRN
Qty: 40 TABLET | Refills: 0 | Status: SHIPPED | OUTPATIENT
Start: 2018-08-03 | End: 2019-10-08

## 2018-08-03 RX ORDER — PROCHLORPERAZINE MALEATE 10 MG
10 TABLET ORAL EVERY 6 HOURS PRN
Status: DISCONTINUED | OUTPATIENT
Start: 2018-08-03 | End: 2018-08-06 | Stop reason: HOSPADM

## 2018-08-03 RX ORDER — FENTANYL CITRATE 50 UG/ML
25-50 INJECTION, SOLUTION INTRAMUSCULAR; INTRAVENOUS
Status: DISCONTINUED | OUTPATIENT
Start: 2018-08-03 | End: 2018-08-03 | Stop reason: CLARIF

## 2018-08-03 RX ORDER — ONDANSETRON 2 MG/ML
4 INJECTION INTRAMUSCULAR; INTRAVENOUS EVERY 6 HOURS PRN
Status: DISCONTINUED | OUTPATIENT
Start: 2018-08-03 | End: 2018-08-06 | Stop reason: HOSPADM

## 2018-08-03 RX ORDER — KETOROLAC TROMETHAMINE 30 MG/ML
30 INJECTION, SOLUTION INTRAMUSCULAR; INTRAVENOUS EVERY 6 HOURS PRN
Status: DISCONTINUED | OUTPATIENT
Start: 2018-08-03 | End: 2018-08-06 | Stop reason: HOSPADM

## 2018-08-03 RX ORDER — ONDANSETRON 4 MG/1
4 TABLET, ORALLY DISINTEGRATING ORAL EVERY 6 HOURS PRN
Status: DISCONTINUED | OUTPATIENT
Start: 2018-08-03 | End: 2018-08-06 | Stop reason: HOSPADM

## 2018-08-03 RX ADMIN — SENNOSIDES AND DOCUSATE SODIUM 2 TABLET: 8.6; 5 TABLET ORAL at 20:05

## 2018-08-03 RX ADMIN — SIMVASTATIN 40 MG: 40 TABLET, FILM COATED ORAL at 20:17

## 2018-08-03 RX ADMIN — ACETAMINOPHEN 975 MG: 325 TABLET, FILM COATED ORAL at 04:33

## 2018-08-03 RX ADMIN — OXYCODONE HYDROCHLORIDE 5 MG: 5 TABLET ORAL at 15:24

## 2018-08-03 RX ADMIN — OXYCODONE HYDROCHLORIDE 10 MG: 5 TABLET ORAL at 19:18

## 2018-08-03 RX ADMIN — OXYCODONE HYDROCHLORIDE 5 MG: 5 TABLET ORAL at 16:23

## 2018-08-03 RX ADMIN — NICOTINE 1 PATCH: 14 PATCH, EXTENDED RELEASE TRANSDERMAL at 16:52

## 2018-08-03 RX ADMIN — GABAPENTIN 300 MG: 300 CAPSULE ORAL at 20:06

## 2018-08-03 RX ADMIN — ONDANSETRON 4 MG: 2 INJECTION INTRAMUSCULAR; INTRAVENOUS at 17:44

## 2018-08-03 RX ADMIN — TRAZODONE HYDROCHLORIDE 100 MG: 100 TABLET ORAL at 22:04

## 2018-08-03 RX ADMIN — ACETAMINOPHEN 975 MG: 325 TABLET, FILM COATED ORAL at 12:49

## 2018-08-03 RX ADMIN — SENNOSIDES AND DOCUSATE SODIUM 2 TABLET: 8.6; 5 TABLET ORAL at 08:25

## 2018-08-03 RX ADMIN — VANCOMYCIN HYDROCHLORIDE 1500 MG: 10 INJECTION, POWDER, LYOPHILIZED, FOR SOLUTION INTRAVENOUS at 06:06

## 2018-08-03 RX ADMIN — Medication 0.5 MG: at 23:27

## 2018-08-03 RX ADMIN — OXYCODONE HYDROCHLORIDE 5 MG: 5 TABLET ORAL at 08:25

## 2018-08-03 RX ADMIN — GABAPENTIN 300 MG: 300 CAPSULE ORAL at 08:25

## 2018-08-03 RX ADMIN — ALBUTEROL SULFATE 2.5 MG: 2.5 SOLUTION RESPIRATORY (INHALATION) at 13:03

## 2018-08-03 RX ADMIN — KETOROLAC TROMETHAMINE 30 MG: 30 INJECTION, SOLUTION INTRAMUSCULAR at 22:04

## 2018-08-03 RX ADMIN — CLONAZEPAM 0.5 MG: 0.5 TABLET ORAL at 22:05

## 2018-08-03 RX ADMIN — ACETAMINOPHEN 975 MG: 325 TABLET, FILM COATED ORAL at 20:04

## 2018-08-03 RX ADMIN — Medication 0.5 MG: at 17:47

## 2018-08-03 RX ADMIN — Medication 0.5 MG: at 20:06

## 2018-08-03 RX ADMIN — FLUTICASONE FUROATE 1 PUFF: 100 POWDER RESPIRATORY (INHALATION) at 08:23

## 2018-08-03 RX ADMIN — OXYCODONE HYDROCHLORIDE 5 MG: 5 TABLET ORAL at 04:33

## 2018-08-03 RX ADMIN — OXYCODONE HYDROCHLORIDE 10 MG: 5 TABLET ORAL at 22:04

## 2018-08-03 NOTE — ANESTHESIA CARE TRANSFER NOTE
Patient: Mattie Carranza    Procedure(s):  LEFT FOOT/ANKLE: FLATFOOT RECONSTRUCTION: calcaneal osteotomy, medial arch tendon repair/transfer & ligament repair; midfoot osteootmy, gastrocnemius recessionTranfer,Spring Ligament Repair,Cotton Osteotomy - Wound Class: I-Clean    Diagnosis: left PTTD  Diagnosis Additional Information: No value filed.    Anesthesia Type:   Periph. Nerve Block for postop pain, General, LMA     Note:  Airway :Face Mask  Patient transferred to:Phase II  Handoff Report: Identifed the Patient, Identified the Reponsible Provider, Reviewed the pertinent medical history, Discussed the surgical course, Reviewed Intra-OP anesthesia mangement and issues during anesthesia, Set expectations for post-procedure period and Allowed opportunity for questions and acknowledgement of understanding      Vitals: (Last set prior to Anesthesia Care Transfer)    CRNA VITALS  8/2/2018 1829 - 8/2/2018 1906      8/2/2018             Pulse: 79    SpO2: 95 %                Electronically Signed By: Brandon Shaffer CRNA, APRN CRNA  August 2, 2018  7:06 PM

## 2018-08-03 NOTE — PLAN OF CARE
Problem: Patient Care Overview  Goal: Plan of Care/Patient Progress Review  Outcome: No Change  Currently denies nausea. Received albuterol neb by respiratory therapy. No change in symptoms. Continues to have some discomfort in her middle chest. States worse with deep breathing and movement.

## 2018-08-03 NOTE — PROGRESS NOTES
CARE TRANSITION SOCIAL WORK INITIAL ASSESSMENT:      Met with: Patient and spouse.    DATA  Active Problems:    Diabetes mellitus, type 2 (H)    Dyslipidemia    Seasonal allergic rhinitis    Anxiety    Depression    Heart murmur    Bipolar I disorder (H)    Asthma    Hypothyroidism    Chronic idiopathic thrombocytopenic purpura (H)    Atypical chest pain             Contact information and PCP information verified: Yes      ASSESSMENT  Cognitive Status: awake, alert and oriented.             Lives With: spouse  Living Arrangements: house                 Insurance Concerns: Discussed Medicare benefits and not having coverage for TCU at this time. Pt would need a 3 night in-pt stay to qualify for benefits. Pt does have coverage for home care. Although, writer has contacted several home care agencies - [facility] at [phone], [facility] at [phone], [facility] at [phone], and [employer] [facility] at [phone]. Writer can not find any agencies that go in pt's area. Writer put call into [facility] at [phone]. Writer is awaiting c/b if able to provide home care services to pt as requesting.          This writer met with pt and spouse, introduced self and role. Prior to admit, pt was independent with ADL's. Pt is wanting to go to a TCU for services. Although, pt does not have the coverage or funds to go. Therefore, writer has been trying to find a home care agency in pt's area of residence in [place], [place]. At this time, pt is stating feeling nauseated and in pain. Pt states she does not feel safe returning home due to her medical condition and having 4 stairs to maneuver in and out of her home. Await PT to re-evaluate.       PLAN    Anticipate pt to discharge home with assist or home care agency if available. Care Transitions to continue to work on discharge arrangements.       Asha Bermudez [facility] [phone]

## 2018-08-03 NOTE — PROGRESS NOTES
08/03/18 1122   Quick Adds   Type of Visit Initial PT Evaluation       Present no   Language english   Living Environment   Lives With spouse   Living Arrangements house   Home Accessibility stairs to enter home   Number of Stairs to Enter Home 4   Number of Stairs Within Home 0   Stair Railings at Home none   Transportation Available family or friend will provide   Living Environment Comment Pt lives in home with . Has 4 stairs to enter without railings. Plan is to carry patient up stairs in wheelchair. Once in home patient will be able to stay on one level.    Self-Care   Dominant Hand right   Usual Activity Tolerance good   Current Activity Tolerance fair   Regular Exercise no   Activity/Exercise Type walking   Exercise Amount/Frequency 3-5 times/wk   Equipment Currently Used at Home none   Activity/Exercise/Self-Care Comment Pt previously would go on walks with  for exercise but pain in L foot and R knee became intolerable and could no longer go for longer walks.    Functional Level Prior   Ambulation 0-->independent   Transferring 0-->independent   Toileting 0-->independent   Bathing 0-->independent   Dressing 0-->independent   Eating 0-->independent   Communication 0-->understands/communicates without difficulty   Swallowing 0-->swallows foods/liquids without difficulty   Cognition 0 - no cognition issues reported   Fall history within last six months no   Number of times patient has fallen within last six months 0   Which of the above functional risks had a recent onset or change? ambulation;transferring;toileting;bathing;dressing   Prior Functional Level Comment Pt previously independent. Pt reports that she was previously wearing a boot that was perscribed by her doctor due to arch pain.    General Information   Onset of Illness/Injury or Date of Surgery - Date 08/02/18   Referring Physician Dr. Vidal   Patient/Family Goals Statement Return to home. Walk independently.     Pertinent History of Current Problem (include personal factors and/or comorbidities that impact the POC) Pt is a 63 y.o s/p LEFT FOOT/ANKLE: FLATFOOT RECONSTRUCTION: calcaneal osteotomy, medial arch tendon repair/transfer & ligament repair; midfoot osteootmy, gastrocnemius recessionTranfer,Spring Ligament Repair,Cotton Osteotomy - Wound Class: I-Clean   Precautions/Limitations fall precautions   Weight-Bearing Status - LUE full weight-bearing   Weight-Bearing Status - RUE full weight-bearing   Weight-Bearing Status - LLE weight-bearing as tolerated   Weight-Bearing Status - RLE full weight-bearing   General Observations Pt is awake in chair upon PT arrival. , Clarence, is in room.    General Info Comments Both pt and  are very receptive to feedback and have a plan for return to home. Pt is aware of her NWB precautions.   Cognitive Status Examination   Orientation orientation to person, place and time   Level of Consciousness alert   Follows Commands and Answers Questions 100% of the time   Personal Safety and Judgment intact   Memory intact   Pain Assessment   Patient Currently in Pain No  (No pain in L foot. Pain in R knee. )   Strength   Strength Comments No formal manual muscle testing was performed. Pt exhibits good strength in R LE during ambulation. Pt has good quad and hamstring control. Pt exhibits weak hip flexors during seated exercises, unable to lift knee through full ROM due to weakness.    Bed Mobility   Bed Mobility Comments Pt requires Anayeli of one with bed mobility to elevate L LE in and out of bed.    Transfer Skills   Transfer Comments Pt requires SBA with transfers, exhibiting ease with movement and steadiness upon standing.    Gait   Gait Comments Pt ambulates with FWW and CGA, exhibiting shoulder elevation in order to hop right LE forward. Pt aware of NWB status and keeps L LE elevated during ambulation.    Balance   Balance Comments Pt exhibits good balance while standing on R LE.   "  Clinical Impression   Criteria for Skilled Therapeutic Intervention yes, treatment indicated   PT Diagnosis s/p L PTTD   Influenced by the following impairments pain, weight-bearing restriction, strength   Functional limitations due to impairments bed mobility, transfers and ambulation   Clinical Presentation Stable/Uncomplicated   Clinical Presentation Rationale Pt demonstrates understanding of surgery, d/c plan and weight-bearing status. Pt is ready to go home with .   Clinical Decision Making (Complexity) Low complexity   Therapy Frequency` other (see comments)   Predicted Duration of Therapy Intervention (days/wks) 1 day   Anticipated Equipment Needs at Discharge wheelchair   Anticipated Discharge Disposition Home with Home Therapy   Risk & Benefits of therapy have been explained Yes   Patient, Family & other staff in agreement with plan of care Yes   Clinical Impression Comments Pt would benefit from continued PT treatment follow d/c to ensure progression with strength and functional mobility.    [facility] AM-PAC  \"6 Clicks\" V.2 Basic Mobility Inpatient Short Form   1. Turning from your back to your side while in a flat bed without using bedrails? 4 - None   2. Moving from lying on your back to sitting on the side of a flat bed without using bedrails? 3 - A Little   3. Moving to and from a bed to a chair (including a wheelchair)? 3 - A Little   4. Standing up from a chair using your arms (e.g., wheelchair, or bedside chair)? 3 - A Little   5. To walk in hospital room? 3 - A Little   6. Climbing 3-5 steps with a railing? 1 - Total   Basic Mobility Raw Score (Score out of 24.Lower scores equate to lower levels of function) 17     "

## 2018-08-03 NOTE — PLAN OF CARE
Problem: Patient Care Overview  Goal: Plan of Care/Patient Progress Review  Outcome: No Change  Patient complained of discomfort in her mid chest. Describes as painful  when she pushes on her chest. Denies shortness of breath. Discussed as possible muscular pain possible from using walker. Mary Lou Albrecht notified. Patient later complains of intermittent nausea. Declined antinausea medication and states pain in chest is worse. Mary Lou Albrecht aware and in to see patient. EKG ordered. Respiratory therapy in room performing test.

## 2018-08-03 NOTE — PLAN OF CARE
"Problem: Patient Care Overview  Goal: Plan of Care/Patient Progress Review  Outcome: No Change  Patient having increased pain in left foot/ankle this afternoon. Patient states \"I think the block is wearing off'. Patient given dose of Oxycodone 5mg without relief. Patient given additional Oxycodone 5 mg. Pain continued to be intolerable. Patient later received dose of IV Dilaudid 0.5 mg. Patient sitting up in chair and now comfortable. Also given dose of Zofran for intermittent nausea. Previously given peppermint oil with some relief of nausea. No vomiting. Ate about half portion of regular dinner. Patient is aware that she will not be discharging tonight.       "

## 2018-08-03 NOTE — BRIEF OP NOTE
[facility] OR   Brief Operative Note    Pre-operative diagnosis: left PTTD   Post-operative diagnosis * No post-op diagnosis entered *   Procedure: Procedure(s):  LEFT FOOT/ANKLE: FLATFOOT RECONSTRUCTION: calcaneal osteotomy, medial arch tendon repair/transfer & ligament repair; midfoot osteootmy, gastrocnemius recessionTranfer,Spring Ligament Repair,Cotton Osteotomy - Wound Class: I-Clean   Surgeon: Mehdi Vidal DPM   Anesthesia: Combined MAC with Retrobulbar    Estimated blood loss: Less than 70-80 mL   Blood transfusion: No transfusion was given during surgery   Drains: None   Specimens: None   Findings: Late stage II left-sided posterior tibial tendon dysfunction with complete tendon disease, spring ligamentous compromise and tearing with collapse of the foot.  Secondary heel eversion and gastrocnemius equinus in the reduced position.  Residual forefoot supinatus position also appreciated.   Complications: None   Condition: Stable   Comments: See dictated operative report for full details.           Mehdi Vidal DPM, FACFAS  Foot & Ankle Surgeon/Specialist  [facility]

## 2018-08-03 NOTE — PLAN OF CARE
Problem: Patient Care Overview  Goal: Plan of Care/Patient Progress Review  Discharge Planner PT   Patient plan for discharge: Home with assist from    Current status:  eval completed. Anayeli with bed mobility, assist with L LE elevation in/out bed. SBA with transfers. CGA with ambulation with FWW of 15'. Pt fatigues quickly during ambulation due to weakness and pain in R LE.   Barriers to return to prior living situation: stairs to enter home without railings. Will need assist x2 to navigate.  Recommendations for discharge: Home with assist  Rationale for recommendations: Pt exhibits steadiness with transfers and ambulation and has assistance as needed from her . Pt would benefit from future PT to continue with strengthening and progression of functional mobility.        Entered by: Kaci Carlin 08/03/2018 12:01 PM      Physical Therapy Discharge Summary    Reason for therapy discharge:    Discharged to home.    Progress towards therapy goal(s). See goals on Care Plan in [facility] electronic health record for goal details.  Pt discharged same day as admit.     Therapy recommendation(s):    Continued therapy is recommended.  Rationale/Recommendations:  strengthening and functional mobility. .

## 2018-08-03 NOTE — PROGRESS NOTES
"[facility] Progress Note      Post-operative Day: 1 Day Post-Op    Procedure(s):  LEFT FOOT/ANKLE: FLATFOOT RECONSTRUCTION: calcaneal osteotomy, medial arch tendon repair/transfer & ligament repair; midfoot osteootmy, gastrocnemius recessionTranfer,Spring Ligament Repair,Cotton Osteotomy - Wound Class: I-Clean      Subjective:    Pain: moderate  Chest pain, SOB:  later in the day patient did have chest discomfort   Patient noticing block wearing off and more pain coming throughout the day.  Patient also dealing with nausea.  Patient having trouble with PT due to problematic right knee made worse with increased demand.    Due to these things she does not feel safe to go home.  Additional medical cares needed.      Objective:  Blood pressure 118/58, pulse 67, temperature 98.1  F (36.7  C), temperature source Oral, resp. rate 16, height 1.727 m (5' 8\"), weight 100.7 kg (222 lb 0.1 oz), SpO2 100 %.    Patient Vitals for the past 24 hrs:   BP Temp Temp src Pulse Heart Rate Resp SpO2 Height Weight   08/03/18 0442 118/58 98.1  F (36.7  C) Oral 67 64 16 - - -   08/03/18 0030 114/57 - - - - - - - -   08/03/18 0023 114/57 98.3  F (36.8  C) Oral - 54 16 100 % - -   08/02/18 2218 - - - - - - 90 % - -   08/02/18 2200 - - - - - - 90 % - -   08/02/18 2110 133/73 98.4  F (36.9  C) Oral - - 18 99 % - -   08/02/18 2050 130/65 - - - - - 100 % - -   08/02/18 2015 142/66 98.2  F (36.8  C) Oral - - 16 100 % 1.727 m (5' 8\") 100.7 kg (222 lb 0.1 oz)   08/02/18 1952 - - - - - - 96 % - -   08/02/18 1932 143/83 - - - 59 16 96 % - -   08/02/18 1915 134/81 - - - 56 16 98 % - -   08/02/18 1900 - 97.8  F (36.6  C) Oral - 69 16 99 % - -   08/02/18 1500 150/82 - - - 62 9 100 % - -   08/02/18 1445 159/76 - - - 67 (!) 7 97 % - -   08/02/18 1430 182/86 - - - - 12 97 % - -   08/02/18 1300 166/88 98.1  F (36.7  C) Oral 67 - 20 97 % 1.727 m (5' 8\") 109.8 kg (242 lb)       Wt Readings from Last 4 Encounters:   08/02/18 100.7 kg (222 lb 0.1 " oz)   11/14/17 110.2 kg (243 lb)   11/01/16 105.2 kg (232 lb)   05/06/16 108.9 kg (240 lb)         Motor function, sensation, and circulation intact   block still somewhate active in AM rounding, decreased sensation and motions  Wound status: incisions are clean dry and intact. Yes  Calf tenderness: Left  No    Pertinent Labs   Lab Results: personally reviewed.     Recent Labs   Lab Test  08/03/18   0645  11/14/17   1410  09/06/16   1233  02/02/16   2218  02/18/15   0937  10/20/14   1331   07/11/13   1259   HGB  13.4  15.1  14.7  15.1  14.2  16.2*   < >   --    HCT  41.1  43.1   --   44.6  42.4  47.5*   < >   --    MCV  93  88   --   90  89  90   < >   --    PLT  130*  134*   --   159  174  159   < >   --    NA   --    --    --   143  140  139   --    --    CRP   --    --    --    --    --    --    --   4.4*    < > = values in this interval not displayed.       Plan: Anticoagulation protocol: due to allergens no ASA - patient aware             Pain medications:  IV + PO while inpatient PRN, more post block needed            Weight bearing status:  NWB, LLE            Disposition:  Potential TCU due to post op issues, medical needs and risks management.             Continue cares and rehabilitation.            Continue PT and assistance.  Right knee pain limiting independence and safe discharge.            Monitor chest pain/discomfort - may warrant additional workup.            Monitor and treat her nausea while inpatient and treat PRN.            SW to see and is aware of situation.  Nursing also aware of potential need for TCU.            Case will be reviewed with Utilization Review in person tomorrow detailing treatment, condition and inpatient needs.             Due to case details, patients inpatient concerns - chest pain, nausea and pain management along with ongoing limiting right knee pain - TCU may be best, safest, most cost effective move forward.  This will be further reviewed.  Patient also dealing with  other co-morbid medical conditions.             Patient has active, real concerns that could limit or diminish her healing potential after major reconstructive foot surgery.   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  She will discuss this whom it matters.             Patient will be seen tomorrow.             Please call or page with questions.  Dr. Vidal [phone]      Mehdi Vidal DPM, FACFAS  Foot & Ankle Surgeon/Specialist  [facility]    Report completed by:  Mehdi Vidal DPM  Date: 8/3/2018  Time: 7:37 AM

## 2018-08-03 NOTE — ANESTHESIA POSTPROCEDURE EVALUATION
Patient: Mattie Carranza    Procedure(s):  LEFT FOOT/ANKLE: FLATFOOT RECONSTRUCTION: calcaneal osteotomy, medial arch tendon repair/transfer & ligament repair; midfoot osteootmy, gastrocnemius recessionTranfer,Spring Ligament Repair,Cotton Osteotomy - Wound Class: I-Clean    Diagnosis:left PTTD  Diagnosis Additional Information: No value filed.    Anesthesia Type:  Periph. Nerve Block for postop pain, General, LMA    Note:  Anesthesia Post Evaluation    Patient location during evaluation: Bedside  Patient participation: Able to participate in evaluation but full recovery from regional anesthesia has not yet ocurrred but is anticipated to occur within 48 hours  Level of consciousness: awake  Pain management: adequate  Airway patency: patent  Cardiovascular status: acceptable  Respiratory status: acceptable  Hydration status: acceptable  PONV: none     Anesthetic complications: None          Last vitals:  Vitals:    08/02/18 1915 08/02/18 1932 08/02/18 1952   BP: 134/81 143/83    Pulse:      Resp: 16 16    Temp:      SpO2: 98% 96% 96%         Electronically Signed By: Brandon Shaffer CRNA, APRN CRNA  August 2, 2018  8:10 PM

## 2018-08-03 NOTE — PLAN OF CARE
Problem: Patient Care Overview  Goal: Plan of Care/Patient Progress Review  Outcome: Improving  Patient VSS, 2L O2 into CPAP for sleep- desats to mid 80s room air with sleep. Tingling present in left toes as block wearing off. Splint/wrap/dressing intact. Patient pulling IS to 1500. Tolerating regular diet. Pain being managed with Tylenol and Oxy, ice packs used. Voiding well. Patient is heavy assist x2 with walker when up to bedside commode. Patient states she had hurt her right knee before surgery on her left ankle and is worried she won't be able to get around at home even with her  helping her. Encouraged movement while remaining non-weight bearing on left leg.

## 2018-08-03 NOTE — PROGRESS NOTES
[facility] Medicine Progress Note  Date of Service: 08/03/2018    Assessment & Plan   Mattie Carranza is a 63 year old female who presented on 8/2/2018 for scheduled Procedure(s):  OSTEOTOMY FOOT by Mehdi Vidal DPM and is being followed by the hospital medicine service for co-management of acute and/or chronic perioperative medical problems.    S/p Procedure(s):  OSTEOTOMY FOOT, left - 8/2/18  1 Day Post-Op    - pain control, wound cares, physical therapy, occupational therapy and DVT prophylaxis per orthopedic surgery service    Atypical chest pain  Patient developed atypical chest pain on evening of 8/2/18. EKG and troponin normal (troponin checked greater than 12 hours after onset of pain). Pain not improved with albuterol neb. No known history of MI, although coronary artery disease is listed on pre-op problem list. Patient has prn nitroglycerine but has never had to use. Sounds to be musculoskeletal vs anxiety provoked. Low concern for current acute coronary syndrome at this time. No tachycardia or hypoxia to suggest PE.  - OK to discharge at this time    Chronic idiopathic thrombocytopenic purpura (H)  Noted on pre-op H&P. Platelets stable at 130.       Diabetes mellitus, type 2 (H)  Diet controlled. Glucose within acceptable range after surgery.  - Accuchecks      Asthma  Taking albuterol and Flovent prior to admission.  - Prn albuterol nebs available  - Substitute Arnuity with Flovent while in the hospital      Hypothyroidism  Taking levothyroxine prior to admission, held per podiatry      Heart murmur  Previously diagnosed. Has known AV sclerosis.      Dyslipidemia  Taking simvastatin prior to admission, continue.      Seasonal allergic rhinitis  Taking Veramyst and Claritin prior to admission, held during hospitalization      Bipolar I disorder (H)  Anxiety  Depression  Slightly anxious about going home. Taking clonazepam prior to admission,  "continue.        DVT Prophylaxis: as per orthopedic surgery service - Pneumatic Compression Devices  Code Status: Full Code    Lines: Peripheral   Heath catheter: Not indicated    Discussion: Medically, the patient appears anxious but stable for discharge    Disposition: Anticipate discharge today, likely home with home care     Attestation:  I have reviewed today's vital signs, notes, medications, labs and imaging.    Mary Lou Albrecht PA-C  [facility]  Pager: [phone]       Interval History   Patient complaining of chest pain that started yesterday evening. Located in central chest, worse with movement or applied pressure. Described as a tightness in chest that \"feels like chest congestion.\" No pleuritic pain. No radiation of pain, no palpitations.    Complains of some nausea but no vomiting. Tolerating oral intake. Urinating normally, no dysuria. No bowel movement but passing flatus.    Mentions having some intermittent throbbing in her bilateral legs that was present prior to surgery. Worse after being on her feet all day. Is not present currently.     Denies palpitations, cough, wheeze, shortness of breath, abdominal pain, headache, vision changes, numbness, tingling.    Physical Exam   Temp:  [97.6  F (36.4  C)-98.4  F (36.9  C)] 97.6  F (36.4  C)  Pulse:  [56-67] 62  Heart Rate:  [54-69] 64  Resp:  [9-18] 16  BP: (114-150)/(56-83) 133/67  SpO2:  [90 %-100 %] 95 %    Weights:   Vitals:    08/02/18 1300 08/02/18 2015   Weight: 109.8 kg (242 lb) 100.7 kg (222 lb 0.1 oz)    Body mass index is 33.76 kg/(m^2).    General appearance: Awake, alert, and in no apparent distress. Pleasant and conversational, speaking in full sentences.  HEENT: Moist mucus membranes, no exudate. No scleral icterus.  CV: Regular rhythm & rate. Known murmur present. No edema. Peripheral pulses intact.  Respiratory: Moving air well bilaterally, no wheezing, crackles, or rhonchi.  GI: Non-distended, soft, " nontender to palpation. No rebound or guarding. Normoactive bowel sounds.  Skin: Warm, dry, no rashes or ecchymoses. No mottling of skin.  Musculoskeletal / extremities: Left lower extremity in ACE bandage and splint.  Neurologic: No focal deficits.      Data     Recent Labs  Lab 08/03/18  1418 08/03/18  0645   WBC  --  11.2*   HGB  --  13.4   MCV  --  93   PLT  --  130*   GLC  --  140*   TROPI <0.015  --          Recent Labs  Lab 08/03/18  0645 08/02/18  1916 08/02/18  1328   *  --   --    BGM  --  101* 81        Unresulted Labs Ordered in the Past 30 Days of this Admission     No orders found for last 61 day(s).           Imaging  No results found for this or any previous visit (from the past 24 hour(s)).     I reviewed all new labs and imaging results over the last 24 hours. I personally reviewed the EKG tracing showing sinus bradycardia, no ST changes.    Medications     lactated ringers 50 mL/hr at 08/02/18 2035       acetaminophen  975 mg Oral Q8H     clonazePAM (klonoPIN) tablet 0.5 mg  0.5 mg Oral At Bedtime     fluticasone furoate  1 puff Inhalation Daily     gabapentin  300 mg Oral BID     senna-docusate  1 tablet Oral BID    Or     senna-docusate  2 tablet Oral BID     simvastatin (ZOCOR) tablet 40 mg  40 mg Oral At Bedtime     sodium chloride (PF)  3 mL Intracatheter Q8H     traZODone (DESYREL) tablet 100 mg  100 mg Oral At Bedtime       Mary Lou Albrecht PA-C  [facility]ist Service  Pager: [phone]

## 2018-08-03 NOTE — PROGRESS NOTES
"[place] [facility] ADMISSION NOTE    Patient admitted to room 2213 at approximately 2015 via cart from surgery. Patient was accompanied by transport tech.     Verbal SBAR report received from Concetta RADER in PACU prior to patient arrival.     Patient trasferred to bed via self. Patient alert and oriented X 3. Pain is controlled with current analgesics.  Medication(s) being used: narcotic analgesics including hydromorphone (Dilaudid).  . Admission vital signs: Blood pressure 130/65, pulse 67, temperature 98.2  F (36.8  C), temperature source Oral, resp. rate 16, height 1.727 m (5' 8\"), weight 100.7 kg (222 lb 0.1 oz), SpO2 100 %. Patient was oriented to plan of care, call light, bed controls, tv, telephone, bathroom and visiting hours.     Risk Assessment    The following safety risks were identified during admission: fall. Yellow risk band applied: YES.     Skin Initial Assessment    This writer admitted this patient and completed a full skin assessment and Patricio score in the Adult PCS flowsheet. Appropriate interventions initiated as needed.     Secondary skin check completed by Magalie Steward RN.    Skin  Inspection of bony prominences: Procedural focused assessment (identify areas inspected)  Full except areas not inspected : Ankle, left, Heel, left (not inspected under spling lower left leg/heel/ankle/foot)  Procedural focused assessment (identify areas inspected) :  (Left leg and foot)  Skin WDL: WDL  Skin Temperature: warm  Skin Moisture: dry  Skin Elasticity: quick return to original state  Skin Integrity: bruise(s), scab(s), tattoo(s), incision(s)    Patricio Risk Assessment  Sensory Perception: 3-->slightly limited  Moisture: 3-->occasionally moist  Activity: 3-->walks occasionally  Mobility: 3-->slightly limited  Nutrition: 3-->adequate  Friction and Shear: 3-->no apparent problem  Patricio Score: 18    Jessie Berry RN    "

## 2018-08-03 NOTE — OP NOTE
[facility] ORTHOPEDICS OPERATIVE REPORT  Operative Report - Orthopedics  Mattie Carranza,  1955, MRN [id number]    Surgery Date: 18    PCP: Gina Howard, [phone]   Code status:  Prior       OPERATION SITE:  [facility] Operating Room       OPERATIVE REPORT  DR. MEHDI BILLY  FOOT & ANKLE SURGEON  [facility] ORTHOPEDICS    DATE OF PROCEDURE: 18    SITE: [facility] Operating Room    SURGEON: Dr. Mehdi Billy - [facility]    ASSISTANT: OMAR Bowers      Pre-Operative Diagnosis:  1.   Left foot late stage II posterior tibial tendon dysfunction with foot collapse  2.  Left foot spring ligamentous compromised with tearing  3.  Adaptive gastrocnemius equinus, left  4.  Heel valgus with subfibular impingement and adaptive changes  5.  Left-sided adaptive forefoot supination/supinatus condition     Post-Operative Diagnosis:  1.   Left foot late stage II posterior tibial tendon dysfunction with foot collapse  2.  Left foot spring ligamentous compromised with tearing  3.  Adaptive gastrocnemius equinus, left  4.  Heel valgus with subfibular impingement and adaptive changes  5.  Left-sided adaptive forefoot supination/supinatus condition     Procedures Performed:  1.  Left medializing calcaneal osteotomy with fixation  2.  Left medial deep deltoid/spring ligamentous repair with internal augmentation  3.  Left medial accessory flexor/FDL tendon transfer with bio  tenodesis  4.  Left posterior tibial tendon repair with debridement and reattachment/advancement  5.  Left midfoot cuneiform cotton osteotomy with titanium wedge placement  6.  Left gastrocnemius recession/modified Marylou procedure  7.  Intraprocedural fluoroscopic assistance  8.  Posterior splint application below the knee ankle neutral    Anesthesia: Monitored Anesthesia Care with peripheral blockade of the popliteal and saphenous nerve conducted by anesthesia services with  ultrasound guidance  Hemostasis: Left thigh tourniquet at 275 mmHg  EBL: <70-80 mL  Findings: Late stage II findings consistent with posterior tibial tendon dysfunction and osteopenia.  Heel valgus, subfibular impingement and adaptive changes.  Medially insertional distal elements of the posterior tibial tendon with profound disease and underlying spring ligamentous tear and compromise with attenuation.  Residual forefoot supination/supinatus  condition, adaptive with gastrocnemius equinus after remaining forefoot reconstructive surgeries.  Implants: Arthrex medializing 7.5 mm calcaneus step-off plate with intramedullary compression.  To fixate the FDL tendon transfer to the 4.5 bio swivel lock anchor was utilized with Bio-Tenodesis.  The internal brace system with bio composite anchor was utilized from the sustentaculum to the navicular with dorsal and inferior arm also incorporating the Bio-Tenodesis.  The cuneiform osteotomy was press-fit with a titanium bio sync wedge to act a structural graft to allow for bony infiltration.  Specimens: None    Indications for the Operation:  The patient has been seen and evaluated in clinic for the above-mentioned diagnoses.  They have failed to respond to nonoperative care measures and/or surgical care for condition was indicated.  They have elected to proceed as recommended/indicated with surgical care after a thorough discussion of the associated pros, cons, risks and benefits of the operations as well as the postoperative course and details.  All associated questions were answered.  Verbal and written form informed consent was obtained.  Please see additional information within the clinical notes.    Description of the Procedure:  Patient was seen and evaluated in the preoperative holding area.  The surgical site was marked.  The consent was signed.  The H&P was updated/reviewed.  Patient was transported from the preoperative holding area to the surgical suite.  The patient  was placed on the operative table.  Anesthesia was obtained.  Antibiotics were administered via IV.  Tourniquet was applied.  The operative extremity was prepped and draped sterilely.  Then, a timeout was performed to identify the proper patient, surgical site and the procedures to be performed.  Local anesthetic was infiltrated about the operative margins for regional blockade utilizing a one-to-one mixture of 2% lidocaine plain and 0.5% Marcaine plain approximately 20 cc of the mixture was utilized.  The foot/ankle was exsanguinated, and the tourniquet was inflated.    Attention was then directed to the left foot.  The posterior lateral incision was made for the medializing calcaneal osteotomy.  This was made with a #15 blade approximately 6 cm in linear length.  This was then dissected down in layers with neurovascular identification and retraction.  Lateral periosteum was released with a Wharton to allow for osteotomy and plate fixation.  With fluoroscopic assistance the osteotomy was planned for and made the posterior tuberosity with the foot knee flexion was mobilized with medial periosteal release and transition medially approximately 7.5 mm the Achilles was engaged and is temporarily fixated in a 7.5 step-off plate was applied first with nonlocking screws and locking screws as well as intramedullary compression which was placed with intraprocedural fluoroscopic assistance which allowed the posterior tuberosity be aligned with the long axis of the tibia.  This lateral site was confirmed with fluoroscopic assistance and closure completed with 2-0 Vicryl and 3-0 nylon after thorough irrigation.    Attention directed to the medial aspect where a medial malleolus distal clavicular incision was made with #15 blade approximately 9 cm in linear length.  This was then dissected down in layers with neurovascular identification and retraction.  Electrocautery utilized to hemostasis any superficial vessels.  The sheath of  the posterior tibial tendon was opened up after his isolate is found to be with reactive darker fluid.  The tendon itself distally is found to be completely diseased with gross tendinopathy this was removed from its distal insertion and found to be quite thick with some calcifications.  The tendon was debrided to normal-appearing healthier tendon for reattachment later.  The adjacent FH Ely tendon sheath was opened up and the FHL was harvested from the distal site proximal to the master knot of Rashid this was then whipstitched.  The underlying spring ligament was evaluated found to be with tear and gross attenuation.  The joint sections were debrided as well as a portion of the inferior navicular to allow for advancement and reattachment.  The sustentaculum inferior margin was isolated interval was made an tap was utilized for placement of a 3.75 bio swivel lock anchor with double-ended collagen coated fiber tape.  The navicular medially was inferiorly debrided.  A guidewire for Bio-Tenodesis drill was utilized.  The segment of the FDL was found to be approximately 5 mm. In the navicular a 5.5 mm drill was utilized approximately 2.0 cm.  Then, the whipstitch tendon ends and the inferior margin of the plantar tape passed from dorsal to superior through the navicular as well as from dorsal to inferior including repositioning after routing fiber tape through the torn segments to allow for repair and imbrication under the navicular to allow for spring ligamentous primary repairs underlying talus talonavicular margins without arthrosis.  This allowed for recoverage of the talus after the 4.5 bio swivel lock anchor was incorporated for Bio-Tenodesis and internal augmentation repair the spring ligament the site was then thoroughly irrigated and closed in anatomical layers with 2-0 Vicryl and 3-0 nylon.    Attention was then directed to the dorsal aspect of the medial cuneiform where a 3 cm incision was made.  This was  dissected down carefully in layers to the dorsal aspect of the navicular which was appraised and proximal and distal margins.  Sagittal saw was utilized to make an osteotomy from dorsal inferior keeping the plantar cortex intact this was white not approximately 7.5 mm to 20 x 7.5 bio sync implant was impacted without need for fixation this was confirmed with intraprocedural fluoroscopic assistance the site was irrigated and closed in layers with 2-0 Vicryl and 3-0 nylon.     Attention directed to the posterior aspect of the leg at the level of the gastrocnemius aponeurosis a 4 cm incision was made this is dissected down to the periosteum which was incised.  With the assistance of a Hohmann retractor as well as an Army-Landen the gastroc was isolated name of the foot in extension with the knee straight #15 blade was utilized to release the fascia/aponeurosis from central to lateral central to medial as dorsiflexion for the +10  on the operative table.  This was then irrigated and closed with 2-0 Vicryl and 3-0 nylon.  Following this, thorough irrigation of the surgical sites was conducted.  Layered, anatomic closure completed with 2-0 Vicryl, 3-0 Vicryl and 3-0 nylon with careful apposition of the skin and surfaces for primary healing.  A compressive sterile splint/dressing was applied.  Vascular status was intact after deflation of the tourniquet.  SPLINT: A posterior fiberglass, below the knee splint was applied with the ankle in the neutral position.  COMPLICATIONS: No direct complications encountered throughout the case.    The patient tolerated the procedure & anesthesia well.  They were transported from the operative suite to the postoperative holding area.  The patient was given postoperative orders as well as specific postoperative instructions which were reviewed by the nursing staff.  Orders were placed for weightbearing status/activity, postoperative oral pain management, DVT prophylaxis measures both with  mechanical and medicinal measures reviewed.  Splint/dressing care measures were reviewed as well as appropriate cryotherapy measures and nutrition.  Postoperative follow-up to be conducted in the next 10-14 days for outpatient clinical follow-up in the Orthopedic clinic at [facility].  If concerns or questions arise or develop they will contact our clinic and postoperative contact numbers provided.  Case details and post-operative care requirements reviewed with family/support present today.  Additionally, a detailed postoperative instruction sheet was provided to the patient and family.  All additional questions were answered postoperatively.      Patient will be admitted to the inpatient medical surgical unit postoperatively as was planned preoperatively.  This will be for medical management of multiple comorbid conditions, pain management, physical therapy as well as nursing care and nutritional support.  Case and care reviewed with patient and family member postoperatively.  Postoperative inpatient stay necessary and appropriate given patient's age, comorbid medical conditions, extensive reconstructive foot surgery, weightbearing limitations as needs as well as preoperative right sided knee pain limiting her nonweightbearing abilities with assistive devices as these have been tested now.  Conditions and for management of patient safety, medical needs, pain management and cost effectiveness would be appropriate to admit her for inpatient stay.  This can be further reviewed with utilization review as will be necessary per protocol.  Social work will also be involved in terms of getting patient care she needs including potential transfer to their facility for rehabilitation controlled environment for approximately 2-3 weeks.  Patient's condition will be followed while inpatient.  Please note that this report was completed with the assistance of voice recognition and transcription services.  Although  every effort has been made to correct and avoid errors, errors may remain.    Dr. Mehdi Vidal, KELVIN, Peace[facility] United General Medical CenterFAS  Foot & Ankle Surgeon/Specialist  [facility]          CC: [facility], Dr. Vidal's Clinical Team

## 2018-08-04 ENCOUNTER — APPOINTMENT (OUTPATIENT)
Dept: OCCUPATIONAL THERAPY | Facility: CLINIC | Age: 63
DRG: 501 | End: 2018-08-04
Attending: PODIATRIST
Payer: MEDICARE

## 2018-08-04 ENCOUNTER — APPOINTMENT (OUTPATIENT)
Dept: PHYSICAL THERAPY | Facility: CLINIC | Age: 63
DRG: 501 | End: 2018-08-04
Attending: PODIATRIST
Payer: MEDICARE

## 2018-08-04 PROBLEM — Z98.890 POST-OPERATIVE STATE: Status: ACTIVE | Noted: 2018-08-04

## 2018-08-04 LAB
GLUCOSE SERPL-MCNC: 99 MG/DL (ref 70–99)
HGB BLD-MCNC: 12.1 G/DL (ref 11.7–15.7)

## 2018-08-04 PROCEDURE — 97110 THERAPEUTIC EXERCISES: CPT | Mod: GP | Performed by: PHYSICAL THERAPIST

## 2018-08-04 PROCEDURE — 40000193 ZZH STATISTIC PT WARD VISIT: Performed by: PHYSICAL THERAPIST

## 2018-08-04 PROCEDURE — G8987 SELF CARE CURRENT STATUS: HCPCS | Mod: CJ

## 2018-08-04 PROCEDURE — 97116 GAIT TRAINING THERAPY: CPT | Mod: GP | Performed by: PHYSICAL THERAPIST

## 2018-08-04 PROCEDURE — 99231 SBSQ HOSP IP/OBS SF/LOW 25: CPT | Performed by: INTERNAL MEDICINE

## 2018-08-04 PROCEDURE — 12000000 ZZH R&B MED SURG/OB

## 2018-08-04 PROCEDURE — 25000132 ZZH RX MED GY IP 250 OP 250 PS 637: Mod: GY | Performed by: FAMILY MEDICINE

## 2018-08-04 PROCEDURE — 25000132 ZZH RX MED GY IP 250 OP 250 PS 637: Mod: GY | Performed by: PODIATRIST

## 2018-08-04 PROCEDURE — 97166 OT EVAL MOD COMPLEX 45 MIN: CPT | Mod: GO

## 2018-08-04 PROCEDURE — 85018 HEMOGLOBIN: CPT | Performed by: PODIATRIST

## 2018-08-04 PROCEDURE — 82947 ASSAY GLUCOSE BLOOD QUANT: CPT | Performed by: PODIATRIST

## 2018-08-04 PROCEDURE — A9270 NON-COVERED ITEM OR SERVICE: HCPCS | Mod: GY | Performed by: FAMILY MEDICINE

## 2018-08-04 PROCEDURE — 00000146 ZZHCL STATISTIC GLUCOSE BY METER IP

## 2018-08-04 PROCEDURE — 25000128 H RX IP 250 OP 636: Performed by: PODIATRIST

## 2018-08-04 PROCEDURE — 40000133 ZZH STATISTIC OT WARD VISIT

## 2018-08-04 PROCEDURE — 36415 COLL VENOUS BLD VENIPUNCTURE: CPT | Performed by: PODIATRIST

## 2018-08-04 PROCEDURE — G8988 SELF CARE GOAL STATUS: HCPCS | Mod: CJ

## 2018-08-04 PROCEDURE — G8989 SELF CARE D/C STATUS: HCPCS | Mod: CJ

## 2018-08-04 PROCEDURE — A9270 NON-COVERED ITEM OR SERVICE: HCPCS | Mod: GY | Performed by: PHYSICIAN ASSISTANT

## 2018-08-04 PROCEDURE — 25000132 ZZH RX MED GY IP 250 OP 250 PS 637: Mod: GY | Performed by: PHYSICIAN ASSISTANT

## 2018-08-04 PROCEDURE — A9270 NON-COVERED ITEM OR SERVICE: HCPCS | Mod: GY | Performed by: PODIATRIST

## 2018-08-04 RX ORDER — HYDROXYZINE HYDROCHLORIDE 50 MG/1
50 TABLET, FILM COATED ORAL EVERY 6 HOURS PRN
Status: DISCONTINUED | OUTPATIENT
Start: 2018-08-04 | End: 2018-08-06 | Stop reason: HOSPADM

## 2018-08-04 RX ADMIN — KETOROLAC TROMETHAMINE 30 MG: 30 INJECTION, SOLUTION INTRAMUSCULAR at 06:50

## 2018-08-04 RX ADMIN — GABAPENTIN 300 MG: 300 CAPSULE ORAL at 23:33

## 2018-08-04 RX ADMIN — SENNOSIDES AND DOCUSATE SODIUM 2 TABLET: 8.6; 5 TABLET ORAL at 08:51

## 2018-08-04 RX ADMIN — OXYCODONE HYDROCHLORIDE 10 MG: 5 TABLET ORAL at 09:58

## 2018-08-04 RX ADMIN — GABAPENTIN 300 MG: 300 CAPSULE ORAL at 08:51

## 2018-08-04 RX ADMIN — KETOROLAC TROMETHAMINE 30 MG: 30 INJECTION, SOLUTION INTRAMUSCULAR at 17:58

## 2018-08-04 RX ADMIN — KETOROLAC TROMETHAMINE 30 MG: 30 INJECTION, SOLUTION INTRAMUSCULAR at 12:34

## 2018-08-04 RX ADMIN — HYDROXYZINE HYDROCHLORIDE 50 MG: 50 TABLET, FILM COATED ORAL at 15:59

## 2018-08-04 RX ADMIN — ACETAMINOPHEN 975 MG: 325 TABLET, FILM COATED ORAL at 12:19

## 2018-08-04 RX ADMIN — OXYCODONE HYDROCHLORIDE 10 MG: 5 TABLET ORAL at 15:59

## 2018-08-04 RX ADMIN — NICOTINE 1 PATCH: 14 PATCH, EXTENDED RELEASE TRANSDERMAL at 08:52

## 2018-08-04 RX ADMIN — ACETAMINOPHEN 975 MG: 325 TABLET, FILM COATED ORAL at 23:32

## 2018-08-04 RX ADMIN — SIMVASTATIN 40 MG: 40 TABLET, FILM COATED ORAL at 23:33

## 2018-08-04 RX ADMIN — FLUTICASONE FUROATE 1 PUFF: 100 POWDER RESPIRATORY (INHALATION) at 08:54

## 2018-08-04 RX ADMIN — ACETAMINOPHEN 975 MG: 325 TABLET, FILM COATED ORAL at 06:49

## 2018-08-04 RX ADMIN — OXYCODONE HYDROCHLORIDE 10 MG: 5 TABLET ORAL at 06:51

## 2018-08-04 RX ADMIN — SENNOSIDES AND DOCUSATE SODIUM 2 TABLET: 8.6; 5 TABLET ORAL at 23:32

## 2018-08-04 RX ADMIN — OXYCODONE HYDROCHLORIDE 10 MG: 5 TABLET ORAL at 13:01

## 2018-08-04 RX ADMIN — HYDROXYZINE HYDROCHLORIDE 50 MG: 50 TABLET, FILM COATED ORAL at 09:58

## 2018-08-04 RX ADMIN — CLONAZEPAM 0.5 MG: 0.5 TABLET ORAL at 23:33

## 2018-08-04 RX ADMIN — KETOROLAC TROMETHAMINE 30 MG: 30 INJECTION, SOLUTION INTRAMUSCULAR at 23:41

## 2018-08-04 RX ADMIN — TRAZODONE HYDROCHLORIDE 100 MG: 100 TABLET ORAL at 23:33

## 2018-08-04 ASSESSMENT — ACTIVITIES OF DAILY LIVING (ADL): ADLS_ACUITY_SCORE: 10

## 2018-08-04 NOTE — PLAN OF CARE
Problem: Patient Care Overview  Goal: Plan of Care/Patient Progress Review  Discharge Planner OT   Patient plan for discharge: home with   Current status: Pt requires S for transfers and functional amb with FWW with occas reminders to keep walker on floor. Discussed at length home needs for DME . Pt adheres to L LE non wbing. Pain today 5/10. Pt c/o R knee weakness only able to bret 2-3 min stands  Barriers to return to prior living situation: tub/shower with 4 stairs to enter  Recommendations for discharge: home with husbands assist who is retired  Rationale for recommendations: Pt has tub bench, bedside commode, scooter and FWW at home. Plans to obtain w/c due to R LE weakness and limited stand bret       Entered by: Sonal Olmedo 08/04/2018 8:59 AM

## 2018-08-04 NOTE — PROGRESS NOTES
08/04/18 0800   Quick Adds   Quick Adds Certification   Type of Visit Initial Occupational Therapy Evaluation   Living Environment   Lives With spouse   Living Arrangements house   Home Accessibility stairs to enter home   Number of Stairs to Enter Home 4   Number of Stairs Within Home 0   Stair Railings at Home none   Transportation Available family or friend will provide   Self-Care   Dominant Hand right   Usual Activity Tolerance good   Current Activity Tolerance fair   Regular Exercise no   Activity/Exercise Type walking   Equipment Currently Used at Home none   Functional Level Prior   Ambulation 0-->independent   Transferring 0-->independent   Toileting 0-->independent   Bathing 0-->independent   Dressing 0-->independent   Eating 0-->independent   Communication 0-->understands/communicates without difficulty   Swallowing 0-->swallows foods/liquids without difficulty   Cognition 0 - no cognition issues reported   Fall history within last six months no   Number of times patient has fallen within last six months 0   Which of the above functional risks had a recent onset or change? ambulation;transferring   Prior Functional Level Comment Pt states was very ind in past. Pt c/o R knee weakness making    General Information   Onset of Illness/Injury or Date of Surgery - Date 08/02/18   Referring Physician Dr Juancarlos Vidal   Patient/Family Goals Statement return home ind as able   Additional Occupational Profile Info/Pertinent History of Current Problem PMH as stated above. Pt admitted for L foot/ankle reconstructive surg with NWBing   Weight-Bearing Status - LUE full weight-bearing   Weight-Bearing Status - RUE full weight-bearing   Weight-Bearing Status - LLE nonweight-bearing   Weight-Bearing Status - RLE weight-bearing as tolerated   General Observations Pt required one v/c for walker to remain on floor   General Info Comments pain control managed today with pt able to move around room with SBA using FWW   Cognitive  Status Examination   Orientation orientation to person, place and time   Level of Consciousness alert   Able to Follow Commands WNL/WFL   Personal Safety (Cognitive) mild impairment   Memory intact   Attention No deficits were identified   Cognitive Comment no major deficits noted   Visual Perception   Visual Perception Wears glasses   Pain Assessment   Patient Currently in Pain Yes, see Vital Sign flowsheet   Range of Motion (ROM)   ROM Quick Adds No deficits were identified   Strength   Manual Muscle Testing Quick Adds No deficits were identified   Hand Strength   Hand Strength Comments WFL   Transfer Skills   Transfer Comments pt getting bed side commode to use next to bed and over toilet today   Transfer Skill: Bed to Chair/Chair to Bed   Level of [place]: Bed to Chair contact guard   Transfer Skill: Sit to Stand   Level of [place]: Sit/Stand independent   Toilet Transfer   Toilet Transfer Comments bed side commode with side arms over toilet will be used   Transfer Skill: Toilet Transfer   Level of [place]: Toilet contact guard   Bathing   Assistive Device tub transfer bench   Upper Body Dressing   Level of [place]: Dress Upper Body independent   Lower Body Dressing   Level of [place]: Dress Lower Body minimum assist (75% patients effort)   Toileting   Level of [place]: Toilet independent   Eating/Self Feeding   Level of [place]: Eating contact guard   Activities of Daily Living Analysis   ADL Comments Pts  to assist with BADLs and IADLS. Discussed at lenght tub transfers, toilet transfers, need for w/c with limited stand bret, use of reacher   Clinical Impression   Criteria for Skilled Therapeutic Interventions Met evaluation only   OT Diagnosis L LE foot/ankle reconstructive surg   Influenced by the following impairments R knee weakness   Assessment of Occupational Performance 3-5 Performance Deficits   Identified Performance Deficits LE dressing, SBA tub transfers, and  "toilet transfers   Clinical Decision Making (Complexity) Moderate complexity   Anticipated Discharge Disposition Home with Assist   Risks and Benefits of Treatment have been explained. Yes   Patient, Family & other staff in agreement with plan of care Yes   Clinical Impression Comments EVAL only with most likely D/C home with  today. Discussed at lent DME needs at home and safety   1x Eval Only-Outpatient/Observation Medicare G-Code   G-code Self Care   Self Care   Self Care: Current Status , Goal ,  Discharge -[place] Only- Modifier the same for all G-codes CJ: 20-39% impairment   Self Care: Current & Discharge Modifier Rationale-Eval Only pt requires assist with BADLs with L LE NWBing with R knee weakness   Self Care Comments as stated   Therapy Certification   Start of Care Date 08/04/18   Certification date from 08/02/18   Certification date to 08/06/18   Medical Diagnosis L LE foot /ankle reconstructive surg   Certification I certify the need for these services furnished under this plan of treatment and while under my care.  (Physician co-signature of this document indicates review and certification of the therapy plan).   [facility] peerTransfer-PAC TM \"6 Clicks\"   2016, Trustees of [facility], under license to [employer].  All rights reserved.   6 Clicks Short Forms Daily Activity Inpatient Short Form   [facility] AM-PAC  \"6 Clicks\" Daily Activity Inpatient Short Form   1. Putting on and taking off regular lower body clothing? 3 - A Little   2. Bathing (including washing, rinsing, drying)? 3 - A Little   3. Toileting, which includes using toilet, bedpan or urinal? 4 - None   4. Putting on and taking off regular upper body clothing? 4 - None   5. Taking care of personal grooming such as brushing teeth? 4 - None   6. Eating meals? 4 - None   Daily Activity Raw Score (Score out of 24.Lower scores equate to lower levels of function) 22   Total Evaluation Time   Total Evaluation " Time (Minutes) 25

## 2018-08-04 NOTE — PLAN OF CARE
Problem: Patient Care Overview  Goal: Plan of Care/Patient Progress Review  Outcome: No Change  Patient denies further chest pain. Troponin level was normal.

## 2018-08-04 NOTE — PROGRESS NOTES
Call placed to ortho on call MD d/t c/o uncontrolled pain, obtained order for Toradol and to loosen ACE and possibly cut surg. dressing, pain in heel and on top of ankle, (CMS intact) per surgeon order - should not removed surg. Dressing. Padded heel w/ gauze sponges and loosely rewrapped ACE. Gave all avail. meds w/o much improvement. Reports some reduction in pain for about 15 - 20 minutes after iv Dilaudid. Call placed to hospitalist to see if pt may try Ativan.

## 2018-08-04 NOTE — PROGRESS NOTES
"[facility] Orthopaedics Progress Note      Post-operative Day: 2 Days Post-Op    Procedure(s):  LEFT FOOT/ANKLE: FLATFOOT RECONSTRUCTION: calcaneal osteotomy, medial arch tendon repair/transfer & ligament repair; midfoot osteootmy, gastrocnemius recessionTranfer,Spring Ligament Repair,Cotton Osteotomy - Wound Class: I-Clean      Subjective:  Patient had uncomfortable night reported.  Patient required IV pain management.  Pain: moderate to severe reported - IV Dilaudid needed last evening.  IV Toradol also proved to be beneficial.  Chest pain, SOB:  ongoing chest discomfort reported - notably stable today.  Patient concerned about post hospital options, understandably.  She is worried this is going to be major triggering for her mental health and well-being.  Right knee pain again reported.  Benefiting from pain medications.  She has concerns in NWB going forward as more demand will be placed upon it.      Objective:  Blood pressure 127/62, pulse 56, temperature 97.9  F (36.6  C), temperature source Oral, resp. rate 18, height 1.727 m (5' 8\"), weight 100.7 kg (222 lb 0.1 oz), SpO2 97 %.    Patient Vitals for the past 24 hrs:   BP Temp Temp src Pulse Heart Rate Resp SpO2   08/04/18 0712 127/62 97.9  F (36.6  C) Oral 56 - 18 97 %   08/04/18 0024 104/60 97.6  F (36.4  C) Oral - 58 18 94 %   08/03/18 2300 - - - - - - 94 %   08/03/18 2100 142/66 97.9  F (36.6  C) Oral - 69 18 94 %   08/03/18 1835 126/58 - - - 65 18 94 %   08/03/18 1054 133/67 97.6  F (36.4  C) Oral 62 - 16 95 %       Wt Readings from Last 4 Encounters:   08/02/18 100.7 kg (222 lb 0.1 oz)   11/14/17 110.2 kg (243 lb)   11/01/16 105.2 kg (232 lb)   05/06/16 108.9 kg (240 lb)         Motor function, sensation, and circulation intact   Yes  Wound status: incisions are clean dry and intact. Yes  Calf tenderness: No  splint in place to left LE.  Clean and dry.  Distal motion and circulation in tact.  Pain upon palpation now in multiple areas.      Pertinent " Labs   Lab Results: personally reviewed.     Recent Labs   Lab Test  08/04/18   0702  08/03/18   0645  11/14/17   1410   02/02/16   2218  02/18/15   0937  10/20/14   1331   07/11/13   1259   HGB  12.1  13.4  15.1   < >  15.1  14.2  16.2*   < >   --    HCT   --   41.1  43.1   --   44.6  42.4  47.5*   < >   --    MCV   --   93  88   --   90  89  90   < >   --    PLT   --   130*  134*   --   159  174  159   < >   --    NA   --    --    --    --   143  140  139   --    --    CRP   --    --    --    --    --    --    --    --   4.4*    < > = values in this interval not displayed.       Plan:   Anticoagulation protocol: due to allergies and intolerances - no ASA.  Patient aware and in agreement.  Pain medications:  oxycodone and + IV while inpatient  Weight bearing status:  NWB LLE - up with assistance and assistive devices.  Disposition:  Patient would benefit from TCU due to complex recovery needs, pain management, up with assistance and NWB condition, DM management, mental health management and optimization of patient outcomes and safety management protocols that will likely manage and mitigate costs associated with her care.  Will likely need TCU for 3 weeks given surgical post op management protocols.  Case can be reviewed as necessary.  Case will be coordinated with social svcs.  Local contacts will be arranged.  Utilization Review contact today - no answer, message left.  Inpatient status was changed without direct contact with me.  Will coordinate her care as appropriate and reasonable.  Continue cares and rehabilitation.  Continue PT and OT svcs.  Continue DM management.  Continue mental health management.  Continue Respiratory and Asthma cares.  Monitor for chest pains.  Will consider imaging and consult for right knee pains as indicated.  Please call or page with questions.  Case and care reviewed with Hospital Physician & Care management svcs along with bedside nurse.  Will round on patient midmorning  tomorrow.          Mehdi Vidal DPM, FACFAS  Foot & Ankle Surgeon/Specialist  [facility]    Report completed by:  Mehdi Vidal DPM  Date: 8/4/2018  Time: 10:32 AM

## 2018-08-04 NOTE — PROGRESS NOTES
[facility] Medicine Progress Note  Date of Service: 08/04/2018    Assessment & Plan   Mattie Carranza is a 63 year old female who presented on 8/2/2018 for scheduled Procedure(s):  OSTEOTOMY FOOT by Mehdi Vidal DPM and is being followed by the hospital medicine service for co-management of acute and/or chronic perioperative medical problems.    S/p Procedure(s):  OSTEOTOMY FOOT, left - 8/2/18  2 Days Post-Op    - pain control, wound cares, physical therapy, occupational therapy and DVT prophylaxis per orthopedic surgery service    Atypical chest pain  Patient developed atypical chest pain on evening of 8/2/18. EKG and troponin normal (troponin checked greater than 12 hours after onset of pain). Pain not improved with albuterol neb. No known history of MI, although coronary artery disease is listed on pre-op problem list. Patient has prn nitroglycerine but has never had to use. Sounds to be musculoskeletal vs anxiety provoked. Low concern for current acute coronary syndrome at this time. No tachycardia or hypoxia to suggest PE.  No more CP since 8/2    Chronic idiopathic thrombocytopenic purpura (H)  Noted on pre-op H&P. Platelets stable at 130.       Diabetes mellitus, type 2 (H)  Diet controlled. Glucose within acceptable range after surgery.  - Accuchecks      Asthma  Taking albuterol and Flovent prior to admission.  - Prn albuterol nebs available  - Substitute Arnuity with Flovent while in the hospital      Hypothyroidism  Taking levothyroxine prior to admission, held per podiatry      Heart murmur  Previously diagnosed. Has known AV sclerosis.      Dyslipidemia  Taking simvastatin prior to admission, continue.      Seasonal allergic rhinitis  Taking Veramyst and Claritin prior to admission, held during hospitalization      Bipolar I disorder (H)  Anxiety  Depression  Slightly anxious about going home. Taking clonazepam prior to admission, continue.        DVT  Prophylaxis: as per orthopedic surgery service - Pneumatic Compression Devices  Code Status: Full Code      Disposition: home vs TCU-? In AM      Interval History   Feels ok. Has R knee pain that is limiting her movement. No cp/sob.     Physical Exam   Temp:  [97.6  F (36.4  C)-97.9  F (36.6  C)] 97.9  F (36.6  C)  Pulse:  [56] 56  Heart Rate:  [58-69] 58  Resp:  [18] 18  BP: (104-142)/(58-66) 127/62  SpO2:  [94 %-97 %] 97 %    Weights:   Vitals:    08/02/18 1300 08/02/18 2015   Weight: 109.8 kg (242 lb) 100.7 kg (222 lb 0.1 oz)    Body mass index is 33.76 kg/(m^2).    General appearance: Awake, alert, and in no apparent distress.  HEENT: Moist mucus membranes, no exudate. No scleral icterus.  CV: Regular rhythm & rate. Known murmur present. No edema. Peripheral pulses intact.  Respiratory: Moving air well bilaterally, no wheezing, crackles, or rhonchi.  GI: Non-distended, soft, nontender to palpation. No rebound or guarding. Normoactive bowel sounds.  Skin: Warm, dry, no rashes or ecchymoses. No mottling of skin.  Musculoskeletal / extremities: Left lower extremity in ACE bandage and splint.  Neurologic: No focal deficits.      Data     Recent Labs  Lab 08/04/18  0702 08/03/18  1418 08/03/18  0645   WBC  --   --  11.2*   HGB 12.1  --  13.4   MCV  --   --  93   PLT  --   --  130*   GLC 99  --  140*   TROPI  --  <0.015  --          Recent Labs  Lab 08/04/18  0702 08/03/18  0645 08/02/18  1916 08/02/18  1328   GLC 99 140*  --   --    BGM  --   --  101* 81        Unresulted Labs Ordered in the Past 30 Days of this Admission     No orders found from 6/3/2018 to 8/3/2018.           Imaging  No results found for this or any previous visit (from the past 24 hour(s)).     I reviewed all new labs and imaging results over the last 24 hours. I personally reviewed the EKG tracing showing sinus bradycardia, no ST changes.    Medications       acetaminophen  975 mg Oral Q8H     clonazePAM (klonoPIN) tablet 0.5 mg  0.5 mg Oral At  Bedtime     fluticasone furoate  1 puff Inhalation Daily     gabapentin  300 mg Oral BID     nicotine  1 patch Transdermal Daily     nicotine   Transdermal Q8H     nicotine   Transdermal Daily     senna-docusate  1 tablet Oral BID    Or     senna-docusate  2 tablet Oral BID     simvastatin (ZOCOR) tablet 40 mg  40 mg Oral At Bedtime     sodium chloride (PF)  3 mL Intracatheter Q8H     traZODone (DESYREL) tablet 100 mg  100 mg Oral At Bedtime

## 2018-08-04 NOTE — PROGRESS NOTES
"Pt finally awakened by nurse this am, pt stated, \"I can't believe how much better I feel this AM, it feels a little numb but o/w it feels like a little cut.\" Pain meds given.  "

## 2018-08-04 NOTE — PLAN OF CARE
Problem: Patient Care Overview  Goal: Plan of Care/Patient Progress Review  Outcome: Improving  Patient pain is more managed today. Tolerable to comfortably managed pain. Received Tylenol, Toradol, Oxycodone and Atarax. Able to ambulate to the bathroom, up to chair and work with therapy.  Continues to be nonweight bearing with large splint in place on left lower leg/foot. Leg elevated on pillows and ice pack in use. Nicotine patch in use. Patient wishes to continue to use Nicotine patch at discharge. No bowel movement. Passing flatus. Receiving scheduled bowel medications and drank prune juice.

## 2018-08-05 ENCOUNTER — APPOINTMENT (OUTPATIENT)
Dept: PHYSICAL THERAPY | Facility: CLINIC | Age: 63
DRG: 501 | End: 2018-08-05
Attending: PODIATRIST
Payer: MEDICARE

## 2018-08-05 LAB
GLUCOSE BLDC GLUCOMTR-MCNC: 75 MG/DL (ref 70–99)
GLUCOSE BLDC GLUCOMTR-MCNC: 90 MG/DL (ref 70–99)
GLUCOSE BLDC GLUCOMTR-MCNC: 91 MG/DL (ref 70–99)

## 2018-08-05 PROCEDURE — 25000132 ZZH RX MED GY IP 250 OP 250 PS 637: Mod: GY | Performed by: PHYSICIAN ASSISTANT

## 2018-08-05 PROCEDURE — 00000146 ZZHCL STATISTIC GLUCOSE BY METER IP

## 2018-08-05 PROCEDURE — A9270 NON-COVERED ITEM OR SERVICE: HCPCS | Mod: GY | Performed by: PODIATRIST

## 2018-08-05 PROCEDURE — 25000132 ZZH RX MED GY IP 250 OP 250 PS 637: Mod: GY | Performed by: PODIATRIST

## 2018-08-05 PROCEDURE — 97116 GAIT TRAINING THERAPY: CPT | Mod: GP | Performed by: PHYSICAL THERAPIST

## 2018-08-05 PROCEDURE — A9270 NON-COVERED ITEM OR SERVICE: HCPCS | Mod: GY | Performed by: FAMILY MEDICINE

## 2018-08-05 PROCEDURE — 40000193 ZZH STATISTIC PT WARD VISIT: Performed by: PHYSICAL THERAPIST

## 2018-08-05 PROCEDURE — 12000000 ZZH R&B MED SURG/OB

## 2018-08-05 PROCEDURE — A9270 NON-COVERED ITEM OR SERVICE: HCPCS | Mod: GY | Performed by: PHYSICIAN ASSISTANT

## 2018-08-05 PROCEDURE — 25000128 H RX IP 250 OP 636: Performed by: PODIATRIST

## 2018-08-05 PROCEDURE — 25000132 ZZH RX MED GY IP 250 OP 250 PS 637: Mod: GY | Performed by: FAMILY MEDICINE

## 2018-08-05 RX ADMIN — TRAZODONE HYDROCHLORIDE 100 MG: 100 TABLET ORAL at 23:03

## 2018-08-05 RX ADMIN — ACETAMINOPHEN 650 MG: 325 TABLET, FILM COATED ORAL at 23:03

## 2018-08-05 RX ADMIN — HYDROXYZINE HYDROCHLORIDE 50 MG: 50 TABLET, FILM COATED ORAL at 23:03

## 2018-08-05 RX ADMIN — OXYCODONE HYDROCHLORIDE 5 MG: 5 TABLET ORAL at 08:54

## 2018-08-05 RX ADMIN — OXYCODONE HYDROCHLORIDE 5 MG: 5 TABLET ORAL at 13:31

## 2018-08-05 RX ADMIN — NICOTINE 1 PATCH: 14 PATCH, EXTENDED RELEASE TRANSDERMAL at 08:55

## 2018-08-05 RX ADMIN — GABAPENTIN 300 MG: 300 CAPSULE ORAL at 08:53

## 2018-08-05 RX ADMIN — ACETAMINOPHEN 975 MG: 325 TABLET, FILM COATED ORAL at 15:49

## 2018-08-05 RX ADMIN — MAGNESIUM HYDROXIDE 30 ML: 400 SUSPENSION ORAL at 13:32

## 2018-08-05 RX ADMIN — SENNOSIDES AND DOCUSATE SODIUM 2 TABLET: 8.6; 5 TABLET ORAL at 08:53

## 2018-08-05 RX ADMIN — SIMVASTATIN 40 MG: 40 TABLET, FILM COATED ORAL at 20:53

## 2018-08-05 RX ADMIN — CLONAZEPAM 0.5 MG: 0.5 TABLET ORAL at 23:03

## 2018-08-05 RX ADMIN — KETOROLAC TROMETHAMINE 30 MG: 30 INJECTION, SOLUTION INTRAMUSCULAR at 20:53

## 2018-08-05 RX ADMIN — OXYCODONE HYDROCHLORIDE 5 MG: 5 TABLET ORAL at 20:53

## 2018-08-05 RX ADMIN — KETOROLAC TROMETHAMINE 30 MG: 30 INJECTION, SOLUTION INTRAMUSCULAR at 06:42

## 2018-08-05 RX ADMIN — FLUTICASONE FUROATE 1 PUFF: 100 POWDER RESPIRATORY (INHALATION) at 08:53

## 2018-08-05 RX ADMIN — OXYCODONE HYDROCHLORIDE 5 MG: 5 TABLET ORAL at 15:49

## 2018-08-05 RX ADMIN — KETOROLAC TROMETHAMINE 30 MG: 30 INJECTION, SOLUTION INTRAMUSCULAR at 13:32

## 2018-08-05 RX ADMIN — ACETAMINOPHEN 975 MG: 325 TABLET, FILM COATED ORAL at 08:53

## 2018-08-05 ASSESSMENT — ACTIVITIES OF DAILY LIVING (ADL)
ADLS_ACUITY_SCORE: 10

## 2018-08-05 NOTE — PROGRESS NOTES
"[facility] Orthopaedics Progress Note      Post-operative Day: 2 Days Post-Op    Procedure(s):  LEFT FOOT/ANKLE: FLATFOOT RECONSTRUCTION: calcaneal osteotomy, medial arch tendon repair/transfer & ligament repair; midfoot osteootmy, gastrocnemius recessionTranfer,Spring Ligament Repair,Cotton Osteotomy - Wound Class: I-Clean      Subjective:  Patient reports that she is doing somewhat better, still required IV Toradol.    Patient required IV pain management + PO pain management.  Pain: mild to moderate  Chest pain: atypical chest pains improved.  SOB: non reported    Right knee pain again reported more painful and sharp with NWB dependence upon it with walker and PT saw her this AM.  She feels a little more secure about getting into her home with help and then being there.  There is talk of a ramp being built and options in doing this with further review from .  Patient has been eating and drinking and eliminating - some constipation.  No dizziness nor light headedness reported.      Objective:  Blood pressure 142/76, pulse 71, temperature 98.7  F (37.1  C), temperature source Oral, resp. rate 20, height 1.727 m (5' 8\"), weight 100.7 kg (222 lb 0.1 oz), SpO2 95 %.    Patient Vitals for the past 24 hrs:   BP Temp Temp src Pulse Heart Rate Resp SpO2   08/05/18 0654 142/76 98.7  F (37.1  C) Oral 71 - 20 95 %   08/04/18 2300 115/64 97.6  F (36.4  C) Oral 52 - 18 98 %   08/04/18 1726 115/53 98.4  F (36.9  C) Oral - 60 18 92 %       Wt Readings from Last 4 Encounters:   08/02/18 100.7 kg (222 lb 0.1 oz)   11/14/17 110.2 kg (243 lb)   11/01/16 105.2 kg (232 lb)   05/06/16 108.9 kg (240 lb)       General: patient alert, oriented and eating lunch.  Motor function, sensation, and circulation intact   Yes  Wound status: incisions are clean dry and intact. Yes  Calf tenderness: No  Aplint in place to left LE.  Clean and dry.  Has been reinforced without additional drainage.  Distal motion and circulation in tact.  Pain " upon palpation in multiple areas/surgical sites as appropriate.      Pertinent Labs   Lab Results: personally reviewed.     Recent Labs   Lab Test  08/04/18   0702  08/03/18   0645  11/14/17   1410   02/02/16   2218  02/18/15   0937  10/20/14   1331   07/11/13   1259   HGB  12.1  13.4  15.1   < >  15.1  14.2  16.2*   < >   --    HCT   --   41.1  43.1   --   44.6  42.4  47.5*   < >   --    MCV   --   93  88   --   90  89  90   < >   --    PLT   --   130*  134*   --   159  174  159   < >   --    NA   --    --    --    --   143  140  139   --    --    CRP   --    --    --    --    --    --    --    --   4.4*    < > = values in this interval not displayed.       Plan:   Anticoagulation protocol: due to allergies and intolerances - no ASA.  Patient aware and in agreement.  Leg/knee exercises.  Mechanical pumps.  Pain medications:  oxycodone and + IV while inpatient - PO home going.  Weight bearing status:  NWB LLE - up with assistance and assistive devices - knee scooter, WC, walkers.  Disposition: Case and care reviewed today with case management/social work department.  Planned discharge to home with home help as long as this can be arranged appropriately.  Home care nursing/nurse's aide to be arranged 3 times per week.  Will assess patient tomorrow.  Still possibility of TCU.  Continue cares and rehabilitation.  Continue PT and OT svcs.  Continue DM management.  Continue mental health management.  Continue Respiratory and Asthma cares.  Monitor for chest pains.  Have been stable.  Will consider imaging and consult for right knee pains as indicated.  Can be arranged outpatient in follow-up.  Patient will need to continue to protect this with bracing.  Please call or page with questions.  Case and care reviewed with Hospital Physician & Care management svcs along with bedside nurse.  Will round on patient tomorrow.          Mehdi Vidal DPM, FACFAS  Foot & Ankle Surgeon/Specialist  [facility]  Orthopedics    Report completed by:  Mehdi Vidal DPM  Date: 8/4/2018  Time: 10:32 AM

## 2018-08-05 NOTE — PLAN OF CARE
Problem: Patient Care Overview  Goal: Plan of Care/Patient Progress Review  Discharge Planner PT   Patient plan for discharge: home w/ assistance or TCU  Current status: Independent w/ bed mobility,   SBA w/ transfers sit to stand.   Pt ambulated 40' X 2 w/ FWW L LE NWB.  Pt fatigues quickly and notes R knee pain.   Brought to PT to attempt stairs however upon further discussion  family would be unable to help get her up even if she scooted on buttocks when she reached the top and that there are no railings for assistance it was recommended she get a temporary ramp and travel WC instead of doing stairs.  Barriers to return to prior living situation: stairs  Recommendations for discharge: home w/ assistance/ temporary ramp and travel WC or TCU  Rationale for recommendations: Patient would benefit from TCU stay to increase strength and endurance for safe return home and ability to do stairs;  If not an option then follow recommendations as above.        Entered by: Veena Barrios 08/05/2018 10:46 AM

## 2018-08-05 NOTE — PLAN OF CARE
Problem: Patient Care Overview  Goal: Plan of Care/Patient Progress Review  Outcome: Improving  Pain much improved. Patient declined Atarax as makes her sleepy. Pain managed with Oxycodone 5 mg dose. Up in hallway with therapy using walker. Continues NWB to left leg. Plan for patient to discharge tomorrow to home.

## 2018-08-05 NOTE — PROGRESS NOTES
Care Transitions Progress Note:    Reason for Follow up:  TCU referrals are pending.  Per the Utilization Review Team patient was approved as Inpatient Status on 8/4/18.      Discharge Plan:  TCU if qualifies for TCU benefit. The patient is unable to private pay for TCU.  If patient returns home Care Transitions will follow up with [facility] [phone] for RN, OT and HHA (she does not need PT this early in her recovery per Dr. Padilla) 8/6 regarding if they are able to service the patient in [place], [place].    Discharge Planner   Discharge Plans in progress: TCU vs HHC  Barriers to discharge plan: Medical stability  Follow up plan: Follow up with orthopedics and PCP       Entered by: Tory Mckeon 08/05/2018 10:40 AM         Tory Mckeon RN, Care Coordinator  [phone]

## 2018-08-05 NOTE — PLAN OF CARE
Problem: Patient Care Overview  Goal: Plan of Care/Patient Progress Review  Outcome: Improving  Slept well, c/o back ache this AM, Aqua K given to try. Toradol for pain, foot/ankle pain much improved. Pt up w/ a little assist and walker.

## 2018-08-06 ENCOUNTER — APPOINTMENT (OUTPATIENT)
Dept: PHYSICAL THERAPY | Facility: CLINIC | Age: 63
DRG: 501 | End: 2018-08-06
Attending: PODIATRIST
Payer: MEDICARE

## 2018-08-06 VITALS
DIASTOLIC BLOOD PRESSURE: 79 MMHG | HEART RATE: 57 BPM | TEMPERATURE: 99.1 F | OXYGEN SATURATION: 99 % | HEIGHT: 68 IN | SYSTOLIC BLOOD PRESSURE: 161 MMHG | BODY MASS INDEX: 33.65 KG/M2 | RESPIRATION RATE: 18 BRPM | WEIGHT: 222 LBS

## 2018-08-06 PROCEDURE — 25000132 ZZH RX MED GY IP 250 OP 250 PS 637: Mod: GY | Performed by: PHYSICIAN ASSISTANT

## 2018-08-06 PROCEDURE — A9270 NON-COVERED ITEM OR SERVICE: HCPCS | Mod: GY | Performed by: PHYSICIAN ASSISTANT

## 2018-08-06 PROCEDURE — 25000132 ZZH RX MED GY IP 250 OP 250 PS 637: Mod: GY | Performed by: FAMILY MEDICINE

## 2018-08-06 PROCEDURE — 40000193 ZZH STATISTIC PT WARD VISIT: Performed by: PHYSICAL THERAPIST

## 2018-08-06 PROCEDURE — A9270 NON-COVERED ITEM OR SERVICE: HCPCS | Mod: GY | Performed by: FAMILY MEDICINE

## 2018-08-06 PROCEDURE — 97530 THERAPEUTIC ACTIVITIES: CPT | Mod: GP | Performed by: PHYSICAL THERAPIST

## 2018-08-06 PROCEDURE — 99231 SBSQ HOSP IP/OBS SF/LOW 25: CPT | Performed by: PHYSICIAN ASSISTANT

## 2018-08-06 PROCEDURE — A9270 NON-COVERED ITEM OR SERVICE: HCPCS | Mod: GY | Performed by: PODIATRIST

## 2018-08-06 PROCEDURE — 25000132 ZZH RX MED GY IP 250 OP 250 PS 637: Mod: GY | Performed by: PODIATRIST

## 2018-08-06 RX ORDER — LEVOTHYROXINE SODIUM 50 UG/1
50 TABLET ORAL DAILY
Status: DISCONTINUED | OUTPATIENT
Start: 2018-08-06 | End: 2018-08-06 | Stop reason: HOSPADM

## 2018-08-06 RX ADMIN — HYDROXYZINE HYDROCHLORIDE 50 MG: 50 TABLET, FILM COATED ORAL at 14:00

## 2018-08-06 RX ADMIN — SENNOSIDES AND DOCUSATE SODIUM 2 TABLET: 8.6; 5 TABLET ORAL at 07:46

## 2018-08-06 RX ADMIN — OXYCODONE HYDROCHLORIDE 10 MG: 5 TABLET ORAL at 07:47

## 2018-08-06 RX ADMIN — FLUTICASONE FUROATE 1 PUFF: 100 POWDER RESPIRATORY (INHALATION) at 07:47

## 2018-08-06 RX ADMIN — OXYCODONE HYDROCHLORIDE 10 MG: 5 TABLET ORAL at 11:10

## 2018-08-06 RX ADMIN — ACETAMINOPHEN 650 MG: 325 TABLET, FILM COATED ORAL at 07:47

## 2018-08-06 RX ADMIN — OXYCODONE HYDROCHLORIDE 5 MG: 5 TABLET ORAL at 14:00

## 2018-08-06 RX ADMIN — NICOTINE 1 PATCH: 14 PATCH, EXTENDED RELEASE TRANSDERMAL at 07:48

## 2018-08-06 ASSESSMENT — ACTIVITIES OF DAILY LIVING (ADL)
ADLS_ACUITY_SCORE: 10

## 2018-08-06 NOTE — PROGRESS NOTES
"[facility] Medicine Progress Note  Date of Service: 08/06/2018    Assessment & Plan   Mattie Carranza is a 63 year old female who presented on 8/2/2018 for scheduled Procedure(s):  OSTEOTOMY FOOT by Mehdi Vidal DPM and is being followed by the hospital medicine service for co-management of acute and/or chronic perioperative medical problems.      S/p Procedure(s):  OSTEOTOMY FOOT, left foot/ankle flatfoot reconstruction   4 Days Post-Op    Pain well managed, though increasing in right knee. Hg 12.1 on 8/4/2018, stable.  - pain control, wound cares, physical therapy, occupational therapy and DVT prophylaxis per orthopedic surgery service  - PT recommends home with home therapy as long as ramp with wheelchair available for home prior to discharge     Atypical chest pain  Chest pain developed 8/2/2018. ECG and troponin (checked > 12 hours after onset of pain), normal. No known history of MI, though coronary artery disease noted on chart review. Has prn nitroglycerin, which she has not used. At time of pain thought to be musculoskeletal vs. Anxiety provoked. Low concern for ACS. No tachycardia or hypoxia concerning for PE. Patient reports chest pain improved with the nebulizer. Now reporting very mild right sided \" upper lung achiness\" which she states has been present for a few days on and off. Vitals stable, no hypoxia. Worse when using walker. No reported relieving factors. No associated symptoms. Suspect musculoskeletal due to new walker use. Reportedly mild currently without concerning features at this time. Patient will notify if worsening or new concerning symptoms develop.   - continue to monitor    Right Knee Pain  Worsening right knee pain since non-weightbearing status on left leg. Located medially, worse with palpation or weight bearing. Reports twisting injury last week, \"things didn't feel right\" after. Suspect musculoskeletal due to recent injury, " "worsened by compensation for NWB left extremity surrounding surgery. Unable to compare laxity with right knee due to recent surgery, no laxity appreciated in medial ligament. No effusion present. Tender to palpation along medial aspect of knee joint.   - pain management per primary service  - consider outpatient follow up for further evaluation if pain persists or worsens    Diabetes Mellitus, Type II  Diet controlled. Blood glucose stable post-op without intervention.  - monitor blood glucose level    Anxiety / Depression / Bipolar I disorder  Anxious about returning home.  - continue home clonazepam, trazodone    Heart murmur  Previously diagnosed. Known history of AV sclerosis.    Asthma  Mild faint crackles in bilateral bases, otherwise lungs clear.  - continue home fluticasone  - prn albuterol available  - encouraged IS    Hypothyroidism  Chronic. Taking levothyroxine prior to admission, initially held by podiatry on admission.   - continue home levothyroxine    Hyperlipidemia  Chronic and stable.  - continue home simvastatin    Chronic idiopathic thrombocytopenic purpura  Noted on pre-op H&P. Platelets stable at 130 on 8/3/2018.    History of seasonal allergies  On home Claritin and \"allergy drops\", held on admission.    DVT Prophylaxis: as per podiatry service - Defer to primary service, none  Code Status: Full Code    Lines: PIV left lower forearm  Heath catheter: none.    Discussion: Medically, the patient appears stable. Vital signs stable. Continue to monitor right knee and upper chest pain, no medical concerns for discharge. Safe discharge pending family ability to rent wheelchair in order to help patient into house. PT recommends ongoing home therapies.  Disposition: Anticipate discharge today or tomorrow, pending safe discharge plan and once podiatry service has cleared patient for discharge. PT evaluated and recommended a ramp be installed in the home to assist with stairs at entry of house. Patient " "does have ramp available at home though pending wheelchair rental which family is working on securing. Anticipate discharge to home pending wheelchair rental with home therapy for further recovery.     Attestation:  I have reviewed today's vital signs, notes, medications, labs and imaging.  Total time: 15 minutes    This patient was discussed with Dr. Christiano Russell. Plan as above.    Judith Lopez [facility] Medicine    Interval History   Patient was seen this morning.    Reporting right knee pain which started about 1 week ago after \"twisting\" it. Thinks she may have felt something \"snap\". \"It did not feel right\". Has been aching ever since. Worse now since she has been primarily using the right leg due to left leg being not weight bearing. Activity has otherwise been well tolerating.     Good appetite. Sleeping well. Passing gas, 3 bowel movement overnight. Urinating regularly.    Previously reported chest pain has resolved, states it got better after a nebulizer treatment. She does report that her \"right lung is achy\". Not worse with inspiration or palpation. Mild intensity. Comes and goes. Worse with walker use at times. No relieving factors. No shortness of breath.     Denies HA, lightheadedness, dizziness, fever, chills, chest pain, palpitations, cough, wheezes, abdominal pain, N/V/D. Reports numbness, tingling and tightness in left lower extremity near ankle.    Physical Exam   Temp:  [97.2  F (36.2  C)-99.1  F (37.3  C)] 99.1  F (37.3  C)  Pulse:  [57] 57  Heart Rate:  [60-62] 60  Resp:  [18] 18  BP: (145-174)/(76-79) 161/79  SpO2:  [92 %-99 %] 99 %    Weights:   Vitals:    08/02/18 1300 08/02/18 2015   Weight: 109.8 kg (242 lb) 100.7 kg (222 lb 0.1 oz)    Body mass index is 33.76 kg/(m^2).    General: Appears well, sitting up comfortably. Alert and orientated. Pleasant and cooperative. NAD. Non-toxic. Appears stated age.   CV: Regular rate, normal rhythm. 2/6 systolic murmur. Radial pulses are 2+ " bilaterally. Capillary refill is < 2 seconds in bilateral lower extremities. Toes warm. No right ower extremity edema, unable to assess left due to splint.  Respiratory: No accessory muscle usage. Faint crackles bilaterally, otherwise clear to auscultation without wheezes or rhonchi.   GI: Soft, non-tender, non-distended. Bowel sounds are normoactive in a quadrants.  Skin: Warm, dry, intact. Did not assess incision, splint appears clean and dry.  Musculoskeletal: Muscle tone is appropriate. Moves all extremities freely with limited range of motion left lower extremity due to pain and splint. Right knee tender along medial aspect. No effusion. No tenderness with patellar manipulation. Pain with medial loading.  Neuro: Sensation to light touch of bilateral lower extremities is grossly intact.     Data     Recent Labs  Lab 08/04/18  0702 08/03/18  1418 08/03/18  0645   WBC  --   --  11.2*   HGB 12.1  --  13.4   MCV  --   --  93   PLT  --   --  130*   GLC 99  --  140*   TROPI  --  <0.015  --        Recent Labs  Lab 08/05/18  1104 08/05/18  0648 08/04/18  2330 08/04/18  0702 08/03/18  0645 08/02/18  1916 08/02/18  1328   GLC  --   --   --  99 140*  --   --    BGM 75 90 91  --   --  101* 81      Unresulted Labs Ordered in the Past 30 Days of this Admission     No orders found from 6/3/2018 to 8/3/2018.         Imaging  No results found for this or any previous visit (from the past 24 hour(s)).     I reviewed all new labs and imaging results over the last 24 hours. I personally reviewed no images or EKG's today.    Medications       clonazePAM (klonoPIN) tablet 0.5 mg  0.5 mg Oral At Bedtime     fluticasone furoate  1 puff Inhalation Daily     nicotine  1 patch Transdermal Daily     nicotine   Transdermal Q8H     nicotine   Transdermal Daily     senna-docusate  1 tablet Oral BID    Or     senna-docusate  2 tablet Oral BID     simvastatin (ZOCOR) tablet 40 mg  40 mg Oral At Bedtime     sodium chloride (PF)  3 mL  Intracatheter Q8H     traZODone (DESYREL) tablet 100 mg  100 mg Oral At Bedtime     I have discussed patient with Dr. Christiano Russell.    Judith Lopez, [facility]

## 2018-08-06 NOTE — PLAN OF CARE
Problem: Patient Care Overview  Goal: Plan of Care/Patient Progress Review  Discharge Planner PT   Patient plan for discharge: home with  and wheelchair rental; home therapy  Current status: independent with bed mobility, SBA for STS and room transfers using FWW, safe and steady on feet; greatest complaint is R-knee discomfort; increased time spent discussing with patient and  today about temporary ramp to get into house; snowmobile ramp to be used which discussed to be safe but must be with a wheelchair and /son NOT lifting pt up the stairs using a regular chair due to safety concerns for pt and family -  quite set on lifting pt being an ok option so repetition required; pt in process of renting a wheelchair and hoping to have her son pick-up today  Barriers to return to prior living situation: 3 stairs into home  Recommendations for discharge: home with  and wheelchair rental with temporary ramp into home  Rationale for recommendations: supportive family; pt with good mobility; see above; would benefit from home therapy to further address functional independence, strength and activity tolerance       Entered by: Tessa Roldan 08/06/2018 11:39 AM

## 2018-08-06 NOTE — PLAN OF CARE
Problem: Patient Care Overview  Goal: Plan of Care/Patient Progress Review  Outcome: Improving  Pt slept through night again, Did not use her CPAP, Took her usual HS meds and non narc. Pain meds and did not request any other med. During night. Did c/o crampy discomfort in left calf. CMS intact, instructed to call nurse w/ any worsening.

## 2018-08-06 NOTE — DISCHARGE SUMMARY
[facility] Discharge Summary                                  [facility]     NOEL MCKEON [id number]   Age: 63 year old  PCP: Gina Howard, [phone] 1955     Date of Admission:  8/2/2018  Date of Discharge::  8/6/2018  Discharge Physician:  Mehdi Vidal    Code status:  Full Code    Admission Information:  Admission Diagnosis:  Status post foot surgery, unspecified laterality [Z98.890]    Post-Operative Day: 4 Days Post-Op     Reason for admission:  The patient was admitted for the following:Procedure(s) (LRB):  OSTEOTOMY FOOT (Left)    Principal Problem:    Post-operative state  Active Problems:    Diabetes mellitus, type 2 (H)    Dyslipidemia    Seasonal allergic rhinitis    Anxiety    Depression    Heart murmur    Bipolar I disorder (H)    Asthma    Hypothyroidism    Chronic idiopathic thrombocytopenic purpura (H)    Atypical chest pain      Allergies:  Arthrotec; Aspirin; Bupropion; Codeine; Diagnostic x-ray materials; Diazepam; Dye [contrast dye]; Iodine; Latex; Meperidine; Midazolam; Midazolam hcl; Other [seasonal allergies]; Penicillins; Sulfa drugs; and Ziprasidone    Following the procedure noted above the patient was transferred to the post-op floor and started on:    Therapy:  physical therapy and occupational therapy  Anticoagulation Plan: Mechanical, knee exercises due to ASA allergy  for 30 + days  Pain Management: oxycodone  Weight bearing status: Non-weight bearing LLE with assistive devices     The patient was followed and co-managed by the hospitalist service during the inpatient treatment course  Complications:  None  Consultations:  PT, OT, Hospital Medicine, Cardiac Testing  Patient had some atypical chest pain, pain IV requirements along with right knee pain that restricted her early discharge.  Please see inpatient notes for further details.     Pertinent Labs   Lab Results: personally reviewed.     Recent Labs   Lab Test  08/04/18    0702  08/03/18   0645  11/14/17   1410   02/02/16   2218  02/18/15   0937  10/20/14   1331   07/11/13   1259   HGB  12.1  13.4  15.1   < >  15.1  14.2  16.2*   < >   --    HCT   --   41.1  43.1   --   44.6  42.4  47.5*   < >   --    MCV   --   93  88   --   90  89  90   < >   --    PLT   --   130*  134*   --   159  174  159   < >   --    NA   --    --    --    --   143  140  139   --    --    CRP   --    --    --    --    --    --    --    --   4.4*    < > = values in this interval not displayed.          Discharge Information:  Condition at discharge: Stable  Discharge destination:  Discharged to home with home care services arranged.     Medications at discharge:  Current Discharge Medication List      START taking these medications    Details   acetaminophen (TYLENOL) 325 MG tablet Take 2 tablets (650 mg) by mouth every 4 hours as needed for mild pain  Qty: 100 tablet, Refills: 0    Associated Diagnoses: Status post osteotomy      oxyCODONE IR (ROXICODONE) 5 MG tablet Take 1-2 tablets (5-10 mg) by mouth every 4 hours as needed for other (pain control or improvement in physical function. Hold dose for analgesic side effects.)  Qty: 40 tablet, Refills: 0    Associated Diagnoses: Status post osteotomy      senna-docusate (SENOKOT-S;PERICOLACE) 8.6-50 MG per tablet Take 2 tablets by mouth 2 times daily  Qty: 100 tablet    Associated Diagnoses: Status post osteotomy         CONTINUE these medications which have NOT CHANGED    Details   albuterol (PROAIR HFA/PROVENTIL HFA/VENTOLIN HFA) 108 (90 Base) MCG/ACT Inhaler Inhale 2 puffs into the lungs every 6 hours as needed for shortness of breath / dyspnea  Qty: 1 Inhaler, Refills: 1    Associated Diagnoses: Mild intermittent reactive airway disease without complication      ClonazePAM (KLONOPIN PO) Take 0.5 mg by mouth At Bedtime      fluticasone (FLOVENT HFA) 110 MCG/ACT Inhaler Inhale 2 puffs into the lungs 2 times daily      fluticasone (VERAMYST) 27.5 MCG/SPRAY nasal  spray Spray 2 sprays into both nostrils daily  Qty: 30 g, Refills: 12    Comments: Please Dispense #90 day supply  Associated Diagnoses: Perennial allergic rhinitis      glucose blood VI test strips (PRECISION XTRA GLUCOSE) strip Text 1 time by finger poke route every day      LANCETS MISC. Test 1 time daily      LEVOTHYROXINE SODIUM PO Take 50 mcg by mouth daily       loratadine (CLARITIN) 10 MG tablet Take 1 tablet (10 mg) by mouth daily  Qty: 90 tablet, Refills: 3    Associated Diagnoses: Perennial allergic rhinitis      Probiotic Product (PROBIOTIC FORMULA PO) Take  by mouth daily.         SIMVASTATIN PO Take 40 mg by mouth At Bedtime       SUBLINGUAL IMMUNOTHERAPY, SLIT, Continue SLIT, 1 drop three times daily, following standard maintenance protocol.  Qty: 1 vial, Refills: 3    Comments: Written order from SUBHA Ramos, sent to be scanned.  Associated Diagnoses: Perennial allergic rhinitis      TRAZODONE HCL PO Take 100 mg by mouth At Bedtime      cholecalciferol 5000 UNITS CAPS Take 1 capsule (5,000 Units) by mouth daily    Associated Diagnoses: Dyslipidemia      EPINEPHrine (EPIPEN) 0.3 MG/0.3ML injection Inject 0.3 mLs (0.3 mg) into the muscle once as needed  Qty: 2 each, Refills: 1    Associated Diagnoses: Perennial allergic rhinitis; Allergic reaction, subsequent encounter      Nitroglycerin (NITROSTAT SL) Place 0.4 mg under the tongue every 5 minutes as needed for chest pain                        Follow-Up Care:    Patient to be seen in next 10-14 days.    Please call [facility] at [phone] to make/confirm appointment.        Mehdi Vidal DPM, FACFAS  Foot & Ankle Surgeon/Specialist  [facility]      Mehdi Vidal

## 2018-08-06 NOTE — PLAN OF CARE
Problem: Patient Care Overview  Goal: Plan of Care/Patient Progress Review  [place] [facility] DISCHARGE NOTE    Patient discharged to home at 3:17 PM via wheel chair. Accompanied by spouse and staff. Discharge instructions reviewed with patient and spouse, opportunity offered to ask questions. Prescriptions filled and sent with patient upon discharge. All belongings sent with patient.    Melissa Cruz

## 2018-08-06 NOTE — PHARMACY - DISCHARGE MEDICATION RECONCILIATION
Discharge medication review for this patient is complete.   See EPIC for allergy information, prior to admission medications and immunization status.   Pharmacist assisted with medication reconciliation of discharge medications with PTA medications.    MD was contacted with any questions/concerns:None    Additional medication history information:None    Discharge Medication List     Review of your medicines      START taking       Dose / Directions    acetaminophen 325 MG tablet   Commonly known as:  TYLENOL   Used for:  Status post osteotomy        Dose:  650 mg   Take 2 tablets (650 mg) by mouth every 4 hours as needed for mild pain   Quantity:  100 tablet   Refills:  0       oxyCODONE IR 5 MG tablet   Commonly known as:  ROXICODONE   Used for:  Status post osteotomy        Dose:  5-10 mg   Take 1-2 tablets (5-10 mg) by mouth every 4 hours as needed for other (pain control or improvement in physical function. Hold dose for analgesic side effects.)   Quantity:  40 tablet   Refills:  0       senna-docusate 8.6-50 MG per tablet   Commonly known as:  SENOKOT-S;PERICOLACE   Used for:  Status post osteotomy        Dose:  2 tablet   Take 2 tablets by mouth 2 times daily   Quantity:  100 tablet   Refills:  0         CONTINUE these medicines which may have CHANGED, or have new prescriptions. If we are uncertain of the size of tablets/capsules you have at home, strength may be listed as something that might have changed.       Dose / Directions    cholecalciferol 5000 units Caps   This may have changed:  how much to take   Used for:  Dyslipidemia        Dose:  5000 Units   Take 1 capsule (5,000 Units) by mouth daily   Refills:  0         CONTINUE these medicines which have NOT CHANGED       Dose / Directions    albuterol 108 (90 Base) MCG/ACT Inhaler   Commonly known as:  PROAIR HFA/PROVENTIL HFA/VENTOLIN HFA   Used for:  Mild intermittent reactive airway disease without complication        Dose:  2 puff   Inhale 2 puffs  into the lungs every 6 hours as needed for shortness of breath / dyspnea   Quantity:  1 Inhaler   Refills:  1       EPINEPHrine 0.3 MG/0.3ML injection 2-pack   Commonly known as:  EPIPEN/ADRENACLICK/or ANY BX GENERIC EQUIV   Used for:  Perennial allergic rhinitis, Allergic reaction, subsequent encounter        Dose:  0.3 mg   Inject 0.3 mLs (0.3 mg) into the muscle once as needed   Quantity:  2 each   Refills:  1       fluticasone 110 MCG/ACT Inhaler   Commonly known as:  FLOVENT HFA        Dose:  2 puff   Inhale 2 puffs into the lungs 2 times daily   Refills:  0       fluticasone 27.5 MCG/SPRAY spray   Commonly known as:  VERAMYST   Used for:  Perennial allergic rhinitis        Dose:  2 spray   Spray 2 sprays into both nostrils daily   Quantity:  30 g   Refills:  12       KLONOPIN PO        Dose:  0.5 mg   Take 0.5 mg by mouth At Bedtime   Refills:  0       LANCETS MISC.        Test 1 time daily   Refills:  0       LEVOTHYROXINE SODIUM PO        Dose:  50 mcg   Take 50 mcg by mouth daily   Refills:  0       loratadine 10 MG tablet   Commonly known as:  CLARITIN   Used for:  Perennial allergic rhinitis        Dose:  10 mg   Take 1 tablet (10 mg) by mouth daily   Quantity:  90 tablet   Refills:  3       NITROSTAT SL        Dose:  0.4 mg   Place 0.4 mg under the tongue every 5 minutes as needed for chest pain   Refills:  0       PRECISION XTRA GLUCOSE test strip   Generic drug:  blood glucose monitoring        Text 1 time by finger poke route every day   Refills:  0       PROBIOTIC FORMULA PO        Take  by mouth daily.      Refills:  0       SIMVASTATIN PO        Dose:  40 mg   Take 40 mg by mouth At Bedtime   Refills:  0       SUBLINGUAL IMMUNOTHERAPY (SLIT)   Used for:  Perennial allergic rhinitis        Continue SLIT, 1 drop three times daily, following standard maintenance protocol.   Quantity:  1 vial   Refills:  3       TRAZODONE HCL PO        Dose:  100 mg   Take 100 mg by mouth At Bedtime   Refills:  0             Where to get your medicines      These medications were sent to [facility] [place], [place] - [address]  [address] [address]     Phone:  [phone]      acetaminophen 325 MG tablet         Some of these will need a paper prescription and others can be bought over the counter. Ask your nurse if you have questions.     Bring a paper prescription for each of these medications      oxyCODONE IR 5 MG tablet       You don't need a prescription for these medications      senna-docusate 8.6-50 MG per tablet               Anu Boothe, [employer]

## 2018-08-06 NOTE — PROGRESS NOTES
Name: Mattie Carranza    MRN#: [id number]    Reason for Hospitalization: Status post foot surgery, unspecified laterality [Z98.890]    Discharge Date: 8/6/2018    Patient / Family response to discharge plan: MD discussed dc planning with pt and all in agreement for pt to return home with [facility] [phone] Fax: [phone]. This writer spoke directly with [facility] and they are able to accept pt, preliminary information faxed over and [facility] to fax DC orders Pt will dc today.     Other Providers (Care Coordinator, County Services, PCA services etc): No    CTS Hand Off Completed: Not needed    GISELE Score: low    Future Appointments: No future appointments.    Discharge Disposition: home    Carlota NAJERA, [facility], [facility] [phone]

## 2018-08-06 NOTE — PROGRESS NOTES
"[facility] Orthopaedics Progress Note      Post-operative Day: 4 Days Post-Op    Procedure(s):  LEFT FOOT/ANKLE: FLATFOOT RECONSTRUCTION: calcaneal osteotomy, medial arch tendon repair/transfer & ligament repair; midfoot osteootmy, gastrocnemius recessionTranfer,Spring Ligament Repair,Cotton Osteotomy - Wound Class: I-Clean      Subjective:  Patient reports that she is doing somewhat better, still required IV Toradol.    Patient required IV pain management + PO pain management.  Pain: mild to moderate  Chest pain: atypical chest pains improved.  SOB: non reported    Right knee pain again reported more painful and sharp with NWB dependence upon it with walker and PT saw her this AM.  She feels a little more secure about getting into her home with help and then being there.  There is talk of a ramp being built and options in doing this with further review from .  Patient has been eating and drinking and eliminating - some constipation.  No dizziness nor light headedness reported.      Objective:  Blood pressure 174/77, pulse 71, temperature 97.7  F (36.5  C), temperature source Oral, resp. rate 18, height 1.727 m (5' 8\"), weight 100.7 kg (222 lb 0.1 oz), SpO2 92 %.    Patient Vitals for the past 24 hrs:   BP Temp Temp src Pulse Heart Rate Resp SpO2   08/06/18 0016 174/77 97.7  F (36.5  C) Oral - 60 18 92 %   08/05/18 2036 145/76 97.2  F (36.2  C) Oral - 62 18 96 %   08/05/18 0654 142/76 98.7  F (37.1  C) Oral 71 - 20 95 %       Wt Readings from Last 4 Encounters:   08/02/18 100.7 kg (222 lb 0.1 oz)   11/14/17 110.2 kg (243 lb)   11/01/16 105.2 kg (232 lb)   05/06/16 108.9 kg (240 lb)       General: patient alert, oriented and eating lunch.  Motor function, sensation, and circulation intact   Yes  Wound status: incisions are clean dry and intact. Yes  Calf tenderness: No  Aplint in place to left LE.  Clean and dry.  Has been reinforced without additional drainage.  Distal motion and circulation in tact.  Pain " upon palpation in multiple areas/surgical sites as appropriate.      Pertinent Labs   Lab Results: personally reviewed.     Recent Labs   Lab Test  08/04/18   0702  08/03/18   0645  11/14/17   1410   02/02/16   2218  02/18/15   0937  10/20/14   1331   07/11/13   1259   HGB  12.1  13.4  15.1   < >  15.1  14.2  16.2*   < >   --    HCT   --   41.1  43.1   --   44.6  42.4  47.5*   < >   --    MCV   --   93  88   --   90  89  90   < >   --    PLT   --   130*  134*   --   159  174  159   < >   --    NA   --    --    --    --   143  140  139   --    --    CRP   --    --    --    --    --    --    --    --   4.4*    < > = values in this interval not displayed.       Plan:   Anticoagulation protocol: due to allergies and intolerances - no ASA.  Patient aware and in agreement.  Leg/knee exercises.  Mechanical pumps.  Pain medications:  oxycodone and + IV while inpatient - PO home going.  Weight bearing status:  NWB LLE - up with assistance and assistive devices - knee scooter, WC, walkers.  Disposition: Case and care reviewed today with case management/social work department.  Planned discharge to home with home help as long as this can be arranged appropriately.  Home care nursing/nurse's aide to be arranged 3 times per week.  Will assess patient tomorrow.  Still possibility of TCU.  Continue cares and rehabilitation.  Continue PT and OT svcs.  Continue DM management.  Continue mental health management.  Continue Respiratory and Asthma cares.  Monitor for chest pains.  Have been stable.  Will consider imaging and consult for right knee pains as indicated.  Can be arranged outpatient in follow-up.  Patient will need to continue to protect this with bracing.  Please call or page with questions.  Case and care reviewed with Hospital Physician & Care management svcs along with bedside nurse.  Will round on patient tomorrow.          Mehdi Vidal DPM, FACFAS  Foot & Ankle Surgeon/Specialist  [facility]  Orthopedics    Report completed by:  Mehdi Vidal DPM  Date: 8/4/2018  Time: 10:32 AM

## 2018-08-07 NOTE — PLAN OF CARE
Problem: Patient Care Overview  Goal: Plan of Care/Patient Progress Review  Physical Therapy Discharge Summary    Reason for therapy discharge:    Discharged to home with home therapy.    Progress towards therapy goal(s). See goals on Care Plan in [facility] electronic health record for goal details.  Goals met    Therapy recommendation(s):    Continued therapy is recommended.  Rationale/Recommendations:  to improve  mobility w/ NWB status.

## 2018-10-15 ENCOUNTER — TELEPHONE (OUTPATIENT)
Dept: ALLERGY | Facility: OTHER | Age: 63
End: 2018-10-15

## 2018-10-15 NOTE — TELEPHONE ENCOUNTER
"[facility] sent a fax requesting a refill of SLIT drops.  The patient had her last follow-up 5/2017.  She is scheduled to see SUBHA Ramos this month for her next follow-up.  Her drops will be refilled at TID dosing.  She has been on maintenance for the last 2 years.   She can discuss tapering her drops with Samantha at her follow-up visit. She has current epi pens and has done well on her drops.      She states she had a bad reaction to ? Shrimp about a month ago.  She didn't go in, but rather took OTC Benadryl.  She did not use her epi pen.  She saw her provider at a later date for a \"Kenalog injection\".  She states she had a rash for a month.    She will discuss RAST testing when she sees Samantha, and is told to proceed to EMS if she has this reaction again.  She is told to avoid all seafood.  She verbalized understanding and will keep her epi pen with her.    Taya Esteban     "

## 2018-10-19 ENCOUNTER — OFFICE VISIT (OUTPATIENT)
Dept: OTOLARYNGOLOGY | Facility: OTHER | Age: 63
End: 2018-10-19
Attending: PHYSICIAN ASSISTANT
Payer: MEDICARE

## 2018-10-19 VITALS
BODY MASS INDEX: 33.34 KG/M2 | SYSTOLIC BLOOD PRESSURE: 146 MMHG | HEIGHT: 68 IN | DIASTOLIC BLOOD PRESSURE: 72 MMHG | HEART RATE: 89 BPM | TEMPERATURE: 97.6 F | WEIGHT: 220 LBS | OXYGEN SATURATION: 95 %

## 2018-10-19 DIAGNOSIS — Z91.018 FOOD ALLERGY: ICD-10-CM

## 2018-10-19 DIAGNOSIS — J30.89 PERENNIAL ALLERGIC RHINITIS: Primary | ICD-10-CM

## 2018-10-19 DIAGNOSIS — L50.9 URTICARIA: ICD-10-CM

## 2018-10-19 DIAGNOSIS — J30.89 PERENNIAL ALLERGIC RHINITIS: ICD-10-CM

## 2018-10-19 DIAGNOSIS — H10.13 ALLERGIC CONJUNCTIVITIS, BILATERAL: ICD-10-CM

## 2018-10-19 LAB — MISCELLANEOUS TEST: NORMAL

## 2018-10-19 PROCEDURE — 99213 OFFICE O/P EST LOW 20 MIN: CPT | Performed by: PHYSICIAN ASSISTANT

## 2018-10-19 PROCEDURE — G0463 HOSPITAL OUTPT CLINIC VISIT: HCPCS

## 2018-10-19 PROCEDURE — 86003 ALLG SPEC IGE CRUDE XTRC EA: CPT | Mod: ZL | Performed by: PHYSICIAN ASSISTANT

## 2018-10-19 PROCEDURE — 84999 UNLISTED CHEMISTRY PROCEDURE: CPT | Mod: ZL | Performed by: PHYSICIAN ASSISTANT

## 2018-10-19 RX ORDER — CETIRIZINE HYDROCHLORIDE 10 MG/1
10 TABLET ORAL EVERY EVENING
Qty: 30 TABLET | Refills: 1 | Status: SHIPPED | OUTPATIENT
Start: 2018-10-19

## 2018-10-19 ASSESSMENT — PAIN SCALES - GENERAL: PAINLEVEL: WORST PAIN (10)

## 2018-10-19 NOTE — NURSING NOTE
"Chief Complaint   Patient presents with     RECHECK     SLIT followup       Initial /72 (BP Location: Right arm, Patient Position: Sitting, Cuff Size: Adult Large)  Pulse 89  Temp 97.6  F (36.4  C) (Tympanic)  Ht 1.727 m (5' 8\")  Wt 99.8 kg (220 lb)  SpO2 95%  BMI 33.45 kg/m2 Estimated body mass index is 33.45 kg/(m^2) as calculated from the following:    Height as of this encounter: 1.727 m (5' 8\").    Weight as of this encounter: 99.8 kg (220 lb).  Medication Reconciliation: complete    Lina Vila LPN  "

## 2018-10-19 NOTE — MR AVS SNAPSHOT
After Visit Summary   10/19/2018    Mattie Carranza    MRN: [id number]           Patient Information     Date Of Birth          1955        Visit Information        Provider Department      10/19/2018 1:15 PM Samantha Donato PA-C [facility]        Today's Diagnoses     Perennial allergic rhinitis    -  1    Allergic conjunctivitis, bilateral          Care Instructions    Continue with allergy drops. In 6 months- consider decrease of drops to two drops daily.  Monitor symptoms.     Zantac, Zyrtec as needed for hives.   Follow up in 1 year  Shrimp allergy test to be done. Order pended.     Thank you for allowing HUMERA Ramos and our ENT team to participate in your care.  If your medications are too expensive, please give the nurse a call.  We can possibly change this medication.  If you have a scheduling or an appointment question please contact our Health Unit Coordinator at their direct line [phone].   ALL nursing questions or concerns can be directed to your ENT nurse at: [phone] [place]               Follow-ups after your visit        Who to contact     If you have questions or need follow up information about today's clinic visit or your schedule please contact [facility] directly at [phone].  Normal or non-critical lab and imaging results will be communicated to you by MyChart, letter or phone within 4 business days after the clinic has received the results. If you do not hear from us within 7 days, please contact the clinic through MyChart or phone. If you have a critical or abnormal lab result, we will notify you by phone as soon as possible.  Submit refill requests through [employer] or call your pharmacy and they will forward the refill request to us. Please allow 3 business days for your refill to be completed.          Additional Information About Your Visit        Care EveryWhere ID     This is your Care EveryWhere ID. This could be  "used by other organizations to access your [place] medical records  [id number]        Your Vitals Were     Pulse Temperature Height Pulse Oximetry BMI (Body Mass Index)       89 97.6  F (36.4  C) (Tympanic) 1.727 m (5' 8\") 95% 33.45 kg/m2        Blood Pressure from Last 3 Encounters:   10/19/18 146/72   08/06/18 161/79   11/14/17 132/70    Weight from Last 3 Encounters:   10/19/18 99.8 kg (220 lb)   08/02/18 100.7 kg (222 lb 0.1 oz)   11/14/17 110.2 kg (243 lb)              Today, you had the following     No orders found for display         Today's Medication Changes          These changes are accurate as of 10/19/18  1:44 PM.  If you have any questions, ask your nurse or doctor.               These medicines have changed or have updated prescriptions.        Dose/Directions    cholecalciferol 5000 units Caps   This may have changed:  how much to take   Used for:  Dyslipidemia        Dose:  5000 Units   Take 1 capsule (5,000 Units) by mouth daily   Refills:  0                Primary Care Provider Office Phone # Fax #    Ginamartha Bhat [phone]       [address] [address]        Equal Access to Services     SYLVIA ALVA : Carmela sanderso Sokatherine, waaxda luqadaha, qaybta kaalmada adeegyada, chay ramirez. So [facility] [phone].    ATENCIÓN: Si habla español, tiene a capmos disposición servicios gratuitos de asistencia lingüística. Llame al [phone].    We comply with applicable federal civil rights laws and [place] laws. We do not discriminate on the basis of race, color, national origin, age, disability, sex, sexual orientation, or gender identity.            Thank you!     Thank you for choosing [facility]  for your care. Our goal is always to provide you with excellent care. Hearing back from our patients is one way we can continue to improve our services. Please take a few minutes to " complete the written survey that you may receive in the mail after your visit with us. Thank you!             Your Updated Medication List - Protect others around you: Learn how to safely use, store and throw away your medicines at www.disposemymeds.org.          This list is accurate as of 10/19/18  1:44 PM.  Always use your most recent med list.                   Brand Name Dispense Instructions for use Diagnosis    acetaminophen 325 MG tablet    TYLENOL    100 tablet    Take 2 tablets (650 mg) by mouth every 4 hours as needed for mild pain    Status post osteotomy       albuterol 108 (90 Base) MCG/ACT inhaler    PROAIR HFA/PROVENTIL HFA/VENTOLIN HFA    1 Inhaler    Inhale 2 puffs into the lungs every 6 hours as needed for shortness of breath / dyspnea    Mild intermittent reactive airway disease without complication       cholecalciferol 5000 units Caps      Take 1 capsule (5,000 Units) by mouth daily    Dyslipidemia       EPINEPHrine 0.3 MG/0.3ML injection 2-pack    EPIPEN/ADRENACLICK/or ANY BX GENERIC EQUIV    2 each    Inject 0.3 mLs (0.3 mg) into the muscle once as needed    Perennial allergic rhinitis, Allergic reaction, subsequent encounter       fluticasone 110 MCG/ACT Inhaler    FLOVENT HFA     Inhale 2 puffs into the lungs 2 times daily        fluticasone 27.5 MCG/SPRAY spray    VERAMYST    30 g    Spray 2 sprays into both nostrils daily    Perennial allergic rhinitis       KLONOPIN PO      Take 0.5 mg by mouth At Bedtime        LANCETS MISC.      Test 1 time daily        LEVOTHYROXINE SODIUM PO      Take 50 mcg by mouth daily        loratadine 10 MG tablet    CLARITIN    90 tablet    Take 1 tablet (10 mg) by mouth daily    Perennial allergic rhinitis       NITROSTAT SL      Place 0.4 mg under the tongue every 5 minutes as needed for chest pain        oxyCODONE IR 5 MG tablet    ROXICODONE    40 tablet    Take 1-2 tablets (5-10 mg) by mouth every 4 hours as needed for other (pain control or improvement  in physical function. Hold dose for analgesic side effects.)    Status post osteotomy       PRECISION XTRA GLUCOSE test strip   Generic drug:  blood glucose monitoring      Text 1 time by finger poke route every day        PROBIOTIC FORMULA PO      Take  by mouth daily.           senna-docusate 8.6-50 MG per tablet    SENOKOT-S;PERICOLACE    100 tablet    Take 2 tablets by mouth 2 times daily    Status post osteotomy       SIMVASTATIN PO      Take 40 mg by mouth At Bedtime        SUBLINGUAL IMMUNOTHERAPY (SLIT)     1 vial    Continue SLIT, 1 drop three times daily, following standard maintenance protocol.    Perennial allergic rhinitis       TRAZODONE HCL PO      Take 100 mg by mouth At Bedtime

## 2018-10-19 NOTE — LETTER
10/19/2018         RE: Mattie Carranza  [address] [facility] [address]        Dear Colleague,    Thank you for referring your patient, Mattie Carranza, to the [facility]. Please see a copy of my visit note below.      Patient presents with:  RECHECK: SLIT followup      HPI- Mattie returns for her annual SLIT. She has been doing well since her last visit. She has been using TID dosing of SLIT. She has been on Maintenance dose since 8/2016.  Mattie has been doing well since starting allergy drops.   She has been exposed to dogs with good control and     Denies oral itching, swelling.   Denies sinusitis. Denies congestion.     She did have reaction to something. Developed hives head to toe. She was treated w/ kenalog injection. She had felt it was fresh seafood. She did eat stuffed salmon without issue. She had otherwise eaten shrimp without concern.       Current Outpatient Rx   Medication Sig Dispense Refill     acetaminophen (TYLENOL) 325 MG tablet Take 2 tablets (650 mg) by mouth every 4 hours as needed for mild pain 100 tablet 0     albuterol (PROAIR HFA/PROVENTIL HFA/VENTOLIN HFA) 108 (90 Base) MCG/ACT Inhaler Inhale 2 puffs into the lungs every 6 hours as needed for shortness of breath / dyspnea 1 Inhaler 1     cholecalciferol 5000 UNITS CAPS Take 1 capsule (5,000 Units) by mouth daily (Patient taking differently: Take 2,000 Units by mouth daily )       ClonazePAM (KLONOPIN PO) Take 0.5 mg by mouth At Bedtime       EPINEPHrine (EPIPEN) 0.3 MG/0.3ML injection Inject 0.3 mLs (0.3 mg) into the muscle once as needed 2 each 1     fluticasone (FLOVENT HFA) 110 MCG/ACT Inhaler Inhale 2 puffs into the lungs 2 times daily       fluticasone (VERAMYST) 27.5 MCG/SPRAY nasal spray Spray 2 sprays into both nostrils daily 30 g 12     glucose blood VI test strips (PRECISION XTRA GLUCOSE) strip Text 1 time by finger poke route every day       LANCETS MISC. Test 1 time daily       LEVOTHYROXINE  SODIUM PO Take 50 mcg by mouth daily        loratadine (CLARITIN) 10 MG tablet Take 1 tablet (10 mg) by mouth daily 90 tablet 3     Nitroglycerin (NITROSTAT SL) Place 0.4 mg under the tongue every 5 minutes as needed for chest pain       oxyCODONE IR (ROXICODONE) 5 MG tablet Take 1-2 tablets (5-10 mg) by mouth every 4 hours as needed for other (pain control or improvement in physical function. Hold dose for analgesic side effects.) 40 tablet 0     Probiotic Product (PROBIOTIC FORMULA PO) Take  by mouth daily.          senna-docusate (SENOKOT-S;PERICOLACE) 8.6-50 MG per tablet Take 2 tablets by mouth 2 times daily 100 tablet      SIMVASTATIN PO Take 40 mg by mouth At Bedtime        SUBLINGUAL IMMUNOTHERAPY, SLIT, Continue SLIT, 1 drop three times daily, following standard maintenance protocol. 1 vial 3     TRAZODONE HCL PO Take 100 mg by mouth At Bedtime         Allergies: Arthrotec; Aspirin; Bupropion; Codeine; Diagnostic x-ray materials; Diazepam; Dye [contrast dye]; Iodine; Latex; Meperidine; Midazolam; Midazolam hcl; Other [seasonal allergies]; Penicillins; Sulfa drugs; and Ziprasidone     Past Medical History:   Diagnosis Date     Adenomatous colon polyp 10/17/2011     ADHD (attention deficit hyperactivity disorder) 10/17/2011     Bipolar I disorder 10/17/2011     Degenerative disk disease 10/17/2011     Diabetes type 2, controlled 10/17/2011     Diverticulosis 10/17/2011     Dyslipidemia 10/17/2011     Insomnia 9/20/2012     Irritable bowel syndrome 10/17/2011     JOSE on CPAP 9/20/2012     Seizures 8/1/2012     Tobacco use disorder 10/17/2011       Past Surgical History:   Procedure Laterality Date     APPENDECTOMY       biopsy oral lesion  2012     CHOLECYSTECTOMY  1983     COLONOSCOPY  2012    diverticulosis -- due 2017     JOINT REPLACEMENT  2009    LT     OSTEOTOMY FOOT Left 8/2/2018    Procedure: OSTEOTOMY FOOT;  LEFT FOOT/ANKLE: FLATFOOT RECONSTRUCTION: calcaneal osteotomy, medial arch tendon  "repair/transfer & ligament repair; midfoot osteootmy, gastrocnemius recessionTranfer,Spring Ligament Repair,Cotton Osteotomy;  Surgeon: Mehdi Vidal DPM;  Location: [place] OR     [facility]  1992     tx of ectopic pregnancy with left tube removal  1981     upper and lower jaw  1990     uretherostomy  2005       ENT family history reviewed    Social History   Substance Use Topics     Smoking status: Current Every Day Smoker     Packs/day: 0.50     Years: 40.00     Types: Cigarettes     Smokeless tobacco: Never Used      Comment: Has cut back on smoking.  Smokes 2 packs per week.  Does not want to quit.     Alcohol use Yes      Comment: \"very rare\", but states that she drank yesterday and today, hx of ETOH abuse       Review of Systems  ROS: 10 point ROS neg other than the symptoms noted above in the HPI   /72 (BP Location: Right arm, Patient Position: Sitting, Cuff Size: Adult Large)  Pulse 89  Temp 97.6  F (36.4  C) (Tympanic)  Ht 1.727 m (5' 8\")  Wt 99.8 kg (220 lb)  SpO2 95%  BMI 33.45 kg/m2    General - The patient is well nourished and well developed, and appears to have good nutritional status.  Alert and oriented to person and place, answers questions and cooperates with examination appropriately. Patient has boot on and cane in exam room.   Head and Face - Normocephalic and atraumatic, with no gross asymmetry noted.  The facial nerve is intact, with strong symmetric movements.  Voice and Breathing - The patient was breathing comfortably without the use of accessory muscles. There was no wheezing, stridor, or stertor.  The patients voice was clear and strong, and had appropriate pitch and quality.  Ears -The external auditory canals are patent, the tympanic membranes are intact without effusion, retraction or mass.  Bony landmarks are intact.  Eyes - Extraocular movements intact, and the pupils were reactive to light.  Sclera were not icteric or injected, conjunctiva were pink and moist.  Mouth " - Examination of the oral cavity showed pink, healthy oral mucosa. No lesions or ulcerations noted.  The tongue was mobile and midline, and the dentition were in good condition.    Throat - The walls of the oropharynx were smooth, pink, moist, symmetric, and had no lesions or ulcerations.  The tonsillar pillars and soft palate were symmetric.  The uvula was midline on elevation.    Neck - Normal midline excursion of the laryngotracheal complex during swallowing.  Full range of motion on passive movement.  Palpation of the occipital, submental, submandibular, internal jugular chain, and supraclavicular nodes did not demonstrate any abnormal lymph nodes or masses.  Palpation of the thyroid was soft and smooth, with no nodules or goiter appreciated.  The trachea was mobile and midline.  Nose - External contour is symmetric, no gross deflection or scars.  Nasal mucosa is pink and moist with no abnormal mucus.  The septum was intact and the turbinates are enlarged.  No polyps, masses, or purulence noted on examination.      ASSESSMENT:    ICD-10-CM    1. Perennial allergic rhinitis J30.89 Shrimp: Laboratory Miscellaneous Order     cetirizine (ZYRTEC) 10 MG tablet   2. Allergic conjunctivitis, bilateral H10.13 Shrimp: Laboratory Miscellaneous Order   3. Food allergy Z91.018 Shrimp: Laboratory Miscellaneous Order     cetirizine (ZYRTEC) 10 MG tablet   4. Urticaria L50.9 cetirizine (ZYRTEC) 10 MG tablet     ranitidine (ZANTAC) 150 MG tablet         Use nasal saline as discussed today. Over the counter Chriss med saline irrigation is recommended.  Try to use hypertonic saline which is 2 packages in 1 chriss med bottle per instructions on bottle.  Use this at least 2 times a day, blow your nose gently, then apply any other nasal medication that may have been prescribed today (nasal steroids or nasal antihistamines).  Take your antihistamine daily (cetirizine, loratadine, fexofenadine, or similar) or twice daily if  recommended.    Continue with allergy drops. In 6 months- consider decrease of drops to two drops daily.  Monitor symptoms.     Zantac, Zyrtec as needed for hives. rx sent to pharmacy.   Follow up in 1 year  Shrimp allergy test to be done. Order pended.       Samantha Donato PA-C  [facility], [place]  [phone]          Again, thank you for allowing me to participate in the care of your patient.        Sincerely,        Samantha Donato PA-C

## 2018-10-19 NOTE — PATIENT INSTRUCTIONS
Continue with allergy drops. In 6 months- consider decrease of drops to two drops daily.  Monitor symptoms.     Zantac, Zyrtec as needed for hives.   Follow up in 1 year  Shrimp allergy test to be done. Order pended.     Thank you for allowing HUMERA Ramos and our ENT team to participate in your care.  If your medications are too expensive, please give the nurse a call.  We can possibly change this medication.  If you have a scheduling or an appointment question please contact our Health Unit Coordinator at their direct line [phone].   ALL nursing questions or concerns can be directed to your ENT nurse at: [phone] [place]

## 2018-10-19 NOTE — PROGRESS NOTES
Patient presents with:  RECHECK: SLIT followup      HPI- Mattie returns for her annual SLIT. She has been doing well since her last visit. She has been using TID dosing of SLIT. She has been on Maintenance dose since 8/2016.  Mattie has been doing well since starting allergy drops.   She has been exposed to dogs with good control and     Denies oral itching, swelling.   Denies sinusitis. Denies congestion.     She did have reaction to something. Developed hives head to toe. She was treated w/ kenalog injection. She had felt it was fresh seafood. She did eat stuffed salmon without issue. She had otherwise eaten shrimp without concern.       Current Outpatient Rx   Medication Sig Dispense Refill     acetaminophen (TYLENOL) 325 MG tablet Take 2 tablets (650 mg) by mouth every 4 hours as needed for mild pain 100 tablet 0     albuterol (PROAIR HFA/PROVENTIL HFA/VENTOLIN HFA) 108 (90 Base) MCG/ACT Inhaler Inhale 2 puffs into the lungs every 6 hours as needed for shortness of breath / dyspnea 1 Inhaler 1     cholecalciferol 5000 UNITS CAPS Take 1 capsule (5,000 Units) by mouth daily (Patient taking differently: Take 2,000 Units by mouth daily )       ClonazePAM (KLONOPIN PO) Take 0.5 mg by mouth At Bedtime       EPINEPHrine (EPIPEN) 0.3 MG/0.3ML injection Inject 0.3 mLs (0.3 mg) into the muscle once as needed 2 each 1     fluticasone (FLOVENT HFA) 110 MCG/ACT Inhaler Inhale 2 puffs into the lungs 2 times daily       fluticasone (VERAMYST) 27.5 MCG/SPRAY nasal spray Spray 2 sprays into both nostrils daily 30 g 12     glucose blood VI test strips (PRECISION XTRA GLUCOSE) strip Text 1 time by finger poke route every day       LANCETS MISC. Test 1 time daily       LEVOTHYROXINE SODIUM PO Take 50 mcg by mouth daily        loratadine (CLARITIN) 10 MG tablet Take 1 tablet (10 mg) by mouth daily 90 tablet 3     Nitroglycerin (NITROSTAT SL) Place 0.4 mg under the tongue every 5 minutes as needed for chest pain       oxyCODONE IR  (ROXICODONE) 5 MG tablet Take 1-2 tablets (5-10 mg) by mouth every 4 hours as needed for other (pain control or improvement in physical function. Hold dose for analgesic side effects.) 40 tablet 0     Probiotic Product (PROBIOTIC FORMULA PO) Take  by mouth daily.          senna-docusate (SENOKOT-S;PERICOLACE) 8.6-50 MG per tablet Take 2 tablets by mouth 2 times daily 100 tablet      SIMVASTATIN PO Take 40 mg by mouth At Bedtime        SUBLINGUAL IMMUNOTHERAPY, SLIT, Continue SLIT, 1 drop three times daily, following standard maintenance protocol. 1 vial 3     TRAZODONE HCL PO Take 100 mg by mouth At Bedtime         Allergies: Arthrotec; Aspirin; Bupropion; Codeine; Diagnostic x-ray materials; Diazepam; Dye [contrast dye]; Iodine; Latex; Meperidine; Midazolam; Midazolam hcl; Other [seasonal allergies]; Penicillins; Sulfa drugs; and Ziprasidone     Past Medical History:   Diagnosis Date     Adenomatous colon polyp 10/17/2011     ADHD (attention deficit hyperactivity disorder) 10/17/2011     Bipolar I disorder 10/17/2011     Degenerative disk disease 10/17/2011     Diabetes type 2, controlled 10/17/2011     Diverticulosis 10/17/2011     Dyslipidemia 10/17/2011     Insomnia 9/20/2012     Irritable bowel syndrome 10/17/2011     JOSE on CPAP 9/20/2012     Seizures 8/1/2012     Tobacco use disorder 10/17/2011       Past Surgical History:   Procedure Laterality Date     APPENDECTOMY       biopsy oral lesion  2012     CHOLECYSTECTOMY  1983     COLONOSCOPY  2012    diverticulosis -- due 2017     JOINT REPLACEMENT  2009    LT     OSTEOTOMY FOOT Left 8/2/2018    Procedure: OSTEOTOMY FOOT;  LEFT FOOT/ANKLE: FLATFOOT RECONSTRUCTION: calcaneal osteotomy, medial arch tendon repair/transfer & ligament repair; midfoot osteootmy, gastrocnemius recessionTranfer,Spring Ligament Repair,Cotton Osteotomy;  Surgeon: Mehdi Vidal DPM;  Location: [facility] OR     [facility]  1992     tx of ectopic pregnancy with left tube removal  1981      "upper and lower jaw  1990     uretherostomy  2005       ENT family history reviewed    Social History   Substance Use Topics     Smoking status: Current Every Day Smoker     Packs/day: 0.50     Years: 40.00     Types: Cigarettes     Smokeless tobacco: Never Used      Comment: Has cut back on smoking.  Smokes 2 packs per week.  Does not want to quit.     Alcohol use Yes      Comment: \"very rare\", but states that she drank yesterday and today, hx of ETOH abuse       Review of Systems  ROS: 10 point ROS neg other than the symptoms noted above in the HPI   /72 (BP Location: Right arm, Patient Position: Sitting, Cuff Size: Adult Large)  Pulse 89  Temp 97.6  F (36.4  C) (Tympanic)  Ht 1.727 m (5' 8\")  Wt 99.8 kg (220 lb)  SpO2 95%  BMI 33.45 kg/m2    General - The patient is well nourished and well developed, and appears to have good nutritional status.  Alert and oriented to person and place, answers questions and cooperates with examination appropriately. Patient has boot on and cane in exam room.   Head and Face - Normocephalic and atraumatic, with no gross asymmetry noted.  The facial nerve is intact, with strong symmetric movements.  Voice and Breathing - The patient was breathing comfortably without the use of accessory muscles. There was no wheezing, stridor, or stertor.  The patients voice was clear and strong, and had appropriate pitch and quality.  Ears -The external auditory canals are patent, the tympanic membranes are intact without effusion, retraction or mass.  Bony landmarks are intact.  Eyes - Extraocular movements intact, and the pupils were reactive to light.  Sclera were not icteric or injected, conjunctiva were pink and moist.  Mouth - Examination of the oral cavity showed pink, healthy oral mucosa. No lesions or ulcerations noted.  The tongue was mobile and midline, and the dentition were in good condition.    Throat - The walls of the oropharynx were smooth, pink, moist, symmetric, and " had no lesions or ulcerations.  The tonsillar pillars and soft palate were symmetric.  The uvula was midline on elevation.    Neck - Normal midline excursion of the laryngotracheal complex during swallowing.  Full range of motion on passive movement.  Palpation of the occipital, submental, submandibular, internal jugular chain, and supraclavicular nodes did not demonstrate any abnormal lymph nodes or masses.  Palpation of the thyroid was soft and smooth, with no nodules or goiter appreciated.  The trachea was mobile and midline.  Nose - External contour is symmetric, no gross deflection or scars.  Nasal mucosa is pink and moist with no abnormal mucus.  The septum was intact and the turbinates are enlarged.  No polyps, masses, or purulence noted on examination.      ASSESSMENT:    ICD-10-CM    1. Perennial allergic rhinitis J30.89 Shrimp: Laboratory Miscellaneous Order     cetirizine (ZYRTEC) 10 MG tablet   2. Allergic conjunctivitis, bilateral H10.13 Shrimp: Laboratory Miscellaneous Order   3. Food allergy Z91.018 Shrimp: Laboratory Miscellaneous Order     cetirizine (ZYRTEC) 10 MG tablet   4. Urticaria L50.9 cetirizine (ZYRTEC) 10 MG tablet     ranitidine (ZANTAC) 150 MG tablet         Use nasal saline as discussed today. Over the counter Chriss med saline irrigation is recommended.  Try to use hypertonic saline which is 2 packages in 1 chriss med bottle per instructions on bottle.  Use this at least 2 times a day, blow your nose gently, then apply any other nasal medication that may have been prescribed today (nasal steroids or nasal antihistamines).  Take your antihistamine daily (cetirizine, loratadine, fexofenadine, or similar) or twice daily if recommended.    Continue with allergy drops. In 6 months- consider decrease of drops to two drops daily.  Monitor symptoms.     Zantac, Zyrtec as needed for hives. rx sent to pharmacy.   Follow up in 1 year  Shrimp allergy test to be done. Order pended.       Samantha Donato  GÓMEZ  ENT  [facility], [place]  [phone]

## 2018-11-07 ENCOUNTER — TELEPHONE (OUTPATIENT)
Dept: OTOLARYNGOLOGY | Facility: OTHER | Age: 63
End: 2018-11-07

## 2018-11-07 NOTE — TELEPHONE ENCOUNTER
Pt was notified of her seafood/fish rast test results.  These we all negative.  Pt states that she has had 2 different times when she had an allergic reactions when she ate shrimp.  She realized that she was on Clindamycin at the time and feels that she had a cross reaction. Pt wanted you to know.

## 2019-02-08 ENCOUNTER — TELEPHONE (OUTPATIENT)
Dept: ALLERGY | Facility: OTHER | Age: 64
End: 2019-02-08

## 2019-02-08 ENCOUNTER — TRANSFERRED RECORDS (OUTPATIENT)
Dept: HEALTH INFORMATION MANAGEMENT | Facility: CLINIC | Age: 64
End: 2019-02-08

## 2019-02-08 NOTE — TELEPHONE ENCOUNTER
Spoke with patient regarding SLIT refill.  Patient states drops are going well with no issues.  Her epi-pens are current.  Patient is not due for a follow-up until October 2019.  Advised patient this vial will still be 3 drops daily, but her next refill will begin tapering and will be 2 drops daily.  Patient verbalized understanding.  Will process refill request.    Sallie Reeder RN

## 2019-02-14 DIAGNOSIS — G47.33 OSA (OBSTRUCTIVE SLEEP APNEA): Primary | ICD-10-CM

## 2019-03-06 ENCOUNTER — DOCUMENTATION ONLY (OUTPATIENT)
Dept: SLEEP MEDICINE | Facility: HOSPITAL | Age: 64
End: 2019-03-06

## 2019-03-06 DIAGNOSIS — G47.00 INSOMNIA, UNSPECIFIED TYPE: ICD-10-CM

## 2019-03-06 DIAGNOSIS — G47.33 OSA (OBSTRUCTIVE SLEEP APNEA): ICD-10-CM

## 2019-03-06 DIAGNOSIS — L50.9 URTICARIA: ICD-10-CM

## 2019-03-06 DIAGNOSIS — F17.200 TOBACCO USE DISORDER: ICD-10-CM

## 2019-03-06 DIAGNOSIS — E66.01 MORBID OBESITY (H): Chronic | ICD-10-CM

## 2019-03-06 DIAGNOSIS — I10 ESSENTIAL HYPERTENSION: ICD-10-CM

## 2019-03-06 DIAGNOSIS — M79.7 FIBROMYALGIA: ICD-10-CM

## 2019-03-06 NOTE — PROGRESS NOTES
SLEEP HISTORY QUESTIONNAIRE    Please describe the main reason for your sleep appointment?witnessed apnea    How long has this been a problem? recent    Have you been diagnosed with a sleep problem in the past? YES    If so, what? apnea    What treatment was recommended? CPAP    Have you had a sleep study in the past? YES    If yes, where and when? [facility] 2012 [facility] 2017    Sleep Habits:   Do you read in bed? No  Do you eat in bed? No  Do you watch TV in bed? No  Do you work in bed? No  Do you use a phone or computer in bed? No    Is you sleep disturbed by:   Bed partner: No  Children: No  Noise: No   Pets: No  Other:       On two or more nights per week, do you drink alcohol to help you fall asleep?NO    On two or more nights per week, do you take melatonin to help you fall asleep? NO    On two or more nights per week, do you take over the counter medicine to fall asleep?  NO    Do you take drinks with caffeine (coffee, tea, soda, energy drinks)? NO    Do you have 3 or more caffeine drinks in a day? NO    Do you have caffeine drinks within 6 hours of bedtime? NO    Do you smoke or use tobacco? YES    Do you exercise? NO    Sleep Routine:   Using a 24 Hour Clock    What time do you usually get into bed on workdays?     Weekend/non work days? 10 pm    What time do you get out of bed on workdays?       Weekend/non work days?6 am    Do you work the evening or night shift or do your shifts rotate? NO    How long does it usually take to fall to sleep? 10-20 minutes    How many times do you wake during the night? 1-2    How much time do you feel that you are awake during the entire night? 5 minutes    How long does it take for you to fall back to sleep after you wake up? Few minutes    Why do you think you wake up? To urinate    What do you do when you wake up? bathroom    How much sleep do you think you get on work nights?     How much sleep do you think you get on weekends/non work days? 7-8  hours    How much sleep do you think you need to feel your best? 7 hours    How many days during a week do you take a nap on average? 1    What is the average length of your naps? 1.5 hours    Do you feel better after taking a nap? YES    If you could chose the best sleep schedule for you, what time would you go to bed? 10 pm  What time would you get up? 6 am    Do you read in bed? NO    Do you eat in bed? NO    Do you watch TV in bed? NO    Do you do work in bed? NO    Do you use a computer or phone in bed? NO    Sleep Disruptions?   Leg movements:  Do you ever have restless, crawling, aching or other unusual feelings in your legs? YES    Do you ever wake yourself by kicking your legs during the night? NO    Are the sheets and blankets messed up or tossed about when you get up? NO    Night-time behaviors:   Do you have nightmares or night terrors? NO   How often?     Have you had times when you were sleep walking? NO    Have you been seen doing anything unusual while you sleep at nights? NO  What?   How often?     Have you ever hurt yourself or someone else while you were sleeping? NO  Please describe:     Do you clench or grind your teeth during the night? unknown    Sleep Apnea (pauses in breathing during sleep):  Do you wake with a headache in the morning? NO  How often?     Does your bed partner, family or friends ever say that you snore? YES  How many nights per week do you snore? all  Can snoring be heard outside the bedroom? yes    Do you ever wake yourself up from snoring, gasping or choking? NO    Have you ever been told that you stop breathing or have pauses in your breathing? YES    Do you wake in the morning with a dry throat or mouth? YES    Do you have trouble breathing through your nose? NO    Do you have problems with heartburn, reflux or a hiatal hernia? NO    Which positions do you usually sleep in? (stomach, back, sides, all) sides    Do you use oxygen or any other medical equipment when you  sleep? NO    Do members of your family (related by blood) snore? YES    Have any members of your family been diagnosed with with sleep apnea? YES    Do other members of your family have restless leg? NO    Do other members of your family have sleep walking? NO    Have you ever had an accident, or near accident due to sleepiness while driving? NO    Does your sleepiness affect your work on the job or at school? DOES NOT APPLY    Do you ever fall asleep by accident while doing a task? NO    Have you had sudden muscle weakness when laughing, angry or surprised? NO    Have you ever been unable to move your body when falling asleep or waking up? NO    Do you ever have trouble  your dreams from real life events? NO  Please describe:     Physical Health: (including illness and injury): During the past 30 days, on how many days was your physical health not good? 3030 days     Mental Health: (including stress, depression, and problems with emotions): During the last 30 days, how may days was your mental health not good?  days.     During the past 30 days, on how many days did poor physical or mental health keep you from doing your usual activities? This might be self-care, work, or play? 30 days.     Social History:   Marital status:     Who lives in your home with you? spouse    Mother (alive or dead)? dead If has , from what? cancer  Father (alive or dead)? dead If has , from what? Heart attack    Siblings: YES  Have any ? YES  If so, from what? quadrapalegia    Currently working? NO  If yes, work:   Former jobs: RN     Sleepiness Scale:   Sitting and reading 2   Watching TV 2   Sitting in a public place 1   Riding in a car 0   Lying down to rest in the afternoon 1   Sitting and talking to someone 0   Sitting quietly after a lunch without alcohol 1   In a car, stopping for a few minutes in traffic 0       Surgical History:   Past Surgical History:   Procedure Laterality Date      APPENDECTOMY       biopsy oral lesion  2012     CHOLECYSTECTOMY  1983     COLONOSCOPY  2012    diverticulosis -- due 2017     JOINT REPLACEMENT  2009    LT     OSTEOTOMY FOOT Left 8/2/2018    Procedure: OSTEOTOMY FOOT;  LEFT FOOT/ANKLE: FLATFOOT RECONSTRUCTION: calcaneal osteotomy, medial arch tendon repair/transfer & ligament repair; midfoot osteootmy, gastrocnemius recessionTranfer,Spring Ligament Repair,Cotton Osteotomy;  Surgeon: Mehdi Vidal DPM;  Location: [facility] OR     [facility] - [facility]  1992     tx of ectopic pregnancy with left tube removal  1981     upper and lower jaw  1990     uretherostomy  2005       Medical Conditions:   Past Medical History:   Diagnosis Date     Adenomatous colon polyp 10/17/2011     ADHD (attention deficit hyperactivity disorder) 10/17/2011     Bipolar I disorder 10/17/2011     Degenerative disk disease 10/17/2011     Diabetes type 2, controlled 10/17/2011     Diverticulosis 10/17/2011     Dyslipidemia 10/17/2011     Insomnia 9/20/2012     Irritable bowel syndrome 10/17/2011     JOSE on CPAP 9/20/2012     Seizures 8/1/2012     Tobacco use disorder 10/17/2011       Medications:   Current Outpatient Medications   Medication Sig     acetaminophen (TYLENOL) 325 MG tablet Take 2 tablets (650 mg) by mouth every 4 hours as needed for mild pain     albuterol (PROAIR HFA/PROVENTIL HFA/VENTOLIN HFA) 108 (90 Base) MCG/ACT Inhaler Inhale 2 puffs into the lungs every 6 hours as needed for shortness of breath / dyspnea     cetirizine (ZYRTEC) 10 MG tablet Take 1 tablet (10 mg) by mouth every evening     cholecalciferol 5000 UNITS CAPS Take 1 capsule (5,000 Units) by mouth daily (Patient taking differently: Take 2,000 Units by mouth daily )     ClonazePAM (KLONOPIN PO) Take 0.5 mg by mouth At Bedtime     EPINEPHrine (EPIPEN) 0.3 MG/0.3ML injection Inject 0.3 mLs (0.3 mg) into the muscle once as needed     fluticasone (FLOVENT HFA) 110 MCG/ACT Inhaler Inhale 2 puffs into the lungs 2 times daily      fluticasone (VERAMYST) 27.5 MCG/SPRAY nasal spray Spray 2 sprays into both nostrils daily     glucose blood VI test strips (PRECISION XTRA GLUCOSE) strip Text 1 time by finger poke route every day     LANCETS MISC. Test 1 time daily     LEVOTHYROXINE SODIUM PO Take 50 mcg by mouth daily      loratadine (CLARITIN) 10 MG tablet Take 1 tablet (10 mg) by mouth daily     Nitroglycerin (NITROSTAT SL) Place 0.4 mg under the tongue every 5 minutes as needed for chest pain     oxyCODONE IR (ROXICODONE) 5 MG tablet Take 1-2 tablets (5-10 mg) by mouth every 4 hours as needed for other (pain control or improvement in physical function. Hold dose for analgesic side effects.) (Patient not taking: Reported on 10/19/2018)     Probiotic Product (PROBIOTIC FORMULA PO) Take  by mouth daily.        ranitidine (ZANTAC) 150 MG tablet Take 1 tablet (150 mg) by mouth 2 times daily as needed for other (hives)     senna-docusate (SENOKOT-S;PERICOLACE) 8.6-50 MG per tablet Take 2 tablets by mouth 2 times daily     SIMVASTATIN PO Take 40 mg by mouth At Bedtime      TRAZODONE HCL PO Take 100 mg by mouth At Bedtime     No current facility-administered medications for this visit.        Are you currently having any of the following symptoms?   General:   Obvious weight gain or loss YES  Fever, chills or sweats YES  Drug allergies: sulfa PCN    Eyes:   Changes in vision YES  Blind spots NO  Double vision NO  Other     Ear, Nose and Throat:   Ear pain NO  Sore throat NO  Sinus pain NO  Post-nasal drip YES  Runny nose NO  Bloody nose NO    Heart:   Rapid or irregular heart beat YES  Chest pain or pressure NO  Out of breath when lying down NO  Swelling in feet or legs YES  High blood pressure unknown  Heart disease unknown    Nervous system   Headaches YES  Weakness in arms or legs YES  Numbness in arms of legs YES  Other:     Skin  Rashes YES  New moles or skin changes YES  Other     Lungs  Shortness of breath at rest NO  Shortness of breath with  activity NO  Dry cough YES  Coughing up mucous or phlegm NO  Coughing up blood NO  Wheezing when breathing NO    Lymph System  Swollen lymph nodes YES  New lumps or bumps NO  Changes in breasts or discharge NO    Digestive System   Nausea or vomiting NO  Loose or watery stools NO  Hard, dry stools (constipation) YES  Fat or grease in stools NO  Blood in stools NO  Stools are black or bloody NO  Abdominal (belly) pain NO    Urinary Tract   Pain when you urinate (pee) NO  Blood in your urine NO  Urinate (pee) more than normal YES  Irregular periods DOES NOT APPLY    Muscles and bones   Muscle pain YES  Joint or bone pain YES  Swollen joints YES  Other     Glands  Increased thirst or urination NO  Diabetes YES  Morning glucose: 90  Afternoon glucose: 115    Mental Health  Depression YES  Anxiety YES  Other mental health issues:

## 2019-03-06 NOTE — PROGRESS NOTES
KEANU LEXA       Name: Mattie Carranza MRN# [id number]   Age: 63 year old YOB: 1955     Stop Bang questionnaire completed with a score of >3 to allow for HST     Have you been told you snore loudly (louder than talking or loud enough to be heard through doors)? YES    Do you often feel tired, fatigued, or sleepy during the daytime? YES    Has anyone observed you stop breathing during your sleep? YES    Do you have or are you being treated for high blood pressure? YES    Is your BMI greater than 35? NO    Is your neck size circumference 16 inches or greater? NO    Are you over 50 years old? YES    Stop Bang Score (# of yes): 5

## 2019-03-08 PROBLEM — J45.909 ASTHMA: Chronic | Status: ACTIVE | Noted: 2018-08-03

## 2019-03-08 PROBLEM — M79.7 FIBROMYALGIA: Status: ACTIVE | Noted: 2019-03-08

## 2019-03-08 PROBLEM — Z98.890 POST-OPERATIVE STATE: Status: RESOLVED | Noted: 2018-08-04 | Resolved: 2019-03-08

## 2019-03-08 PROBLEM — E66.01 MORBID OBESITY (H): Chronic | Status: ACTIVE | Noted: 2019-03-08

## 2019-03-08 PROBLEM — L50.9 URTICARIA: Status: ACTIVE | Noted: 2019-03-08

## 2019-03-08 PROBLEM — R01.1 HEART MURMUR: Status: ACTIVE | Noted: 2018-08-03

## 2019-03-08 PROBLEM — F17.200 TOBACCO USE DISORDER: Status: ACTIVE | Noted: 2019-03-08

## 2019-03-08 PROBLEM — E03.9 HYPOTHYROIDISM: Chronic | Status: ACTIVE | Noted: 2018-08-03

## 2019-03-08 PROBLEM — R07.89 ATYPICAL CHEST PAIN: Status: RESOLVED | Noted: 2018-08-03 | Resolved: 2019-03-08

## 2019-03-08 PROBLEM — E03.9 HYPOTHYROIDISM: Status: ACTIVE | Noted: 2018-08-03

## 2019-03-08 PROBLEM — D69.3 CHRONIC IDIOPATHIC THROMBOCYTOPENIC PURPURA (H): Chronic | Status: ACTIVE | Noted: 2018-08-03

## 2019-03-08 NOTE — PROGRESS NOTES
She initially presented in 2012 when she had a seizure and was hospitalized for 4 days. During that workup the issue of sleep apnea came up. She has had some difficulty with sleep onset which resolved with trazodone and Klonopin. In addition she has some snoring, daytime fatigue (ESS 2). She also has previously been noted to have sleep eating.     Sleep study 8/2012 (227#) AHI 41 events per hour with a low SAT of 69%. Titrated to 13 cm nasal CPAP and later went on an autotitrating unit from 5 to 13 cm.     Due to 'difficulty tolerating CPAP' she had another sleep study at [facility] 12/05/2013 (228 pounds). No slow-wave or REM sleep. Apnea-hypopnea index of 15 events per hour. Low saturation was 83%. The patient was started on nasal CPAP and titrated to a level of 9 cm, which worked quite well.     Due to 'difficult time tolerating CPAP' had another sleep study at [facility] 3/22/17 (251#) with an AHI 26.7/hour and lowest O2 was 82%    Transfer of care?    Initial sleep intake questionnaire 3/6/19 at [facility] notable for  - daily 90 minute naps  - possible restless leg syndrome symptoms   - ESS 7  - Sleep schedule 10 PM - 6 AM    10 point ROS negative with exceptions noted above and below.   Changes in vision YES  Post-nasal drip YES  Rapid or irregular heart beat YES  Swelling in feet or legs YES  Headaches YES  Weakness in arms or legs YES  Numbness in arms of legs YES  Rashes YES  New moles or skin changes YES  Dry cough YES  Swollen lymph nodes YES  Hard, dry stools (constipation) YES  Urinate (pee) more than normal YES  Muscle pain YES  Joint or bone pain YES  Swollen joints YES  Depression YES  Anxiety YES

## 2019-03-12 ENCOUNTER — OFFICE VISIT (OUTPATIENT)
Dept: SLEEP MEDICINE | Facility: HOSPITAL | Age: 64
End: 2019-03-12
Attending: INTERNAL MEDICINE
Payer: MEDICARE

## 2019-03-12 VITALS
RESPIRATION RATE: 14 BRPM | SYSTOLIC BLOOD PRESSURE: 154 MMHG | DIASTOLIC BLOOD PRESSURE: 86 MMHG | HEART RATE: 100 BPM | HEIGHT: 68 IN | OXYGEN SATURATION: 97 % | WEIGHT: 242 LBS | BODY MASS INDEX: 36.68 KG/M2

## 2019-03-12 DIAGNOSIS — G47.00 INSOMNIA, UNSPECIFIED TYPE: ICD-10-CM

## 2019-03-12 DIAGNOSIS — G47.33 OSA (OBSTRUCTIVE SLEEP APNEA): Primary | ICD-10-CM

## 2019-03-12 DIAGNOSIS — E66.01 MORBID OBESITY (H): ICD-10-CM

## 2019-03-12 PROBLEM — M79.7 FIBROMYALGIA: Chronic | Status: ACTIVE | Noted: 2019-03-08

## 2019-03-12 PROBLEM — F31.9 BIPOLAR I DISORDER (H): Chronic | Status: ACTIVE | Noted: 2018-08-03

## 2019-03-12 PROCEDURE — G0463 HOSPITAL OUTPT CLINIC VISIT: HCPCS

## 2019-03-12 PROCEDURE — 99212 OFFICE O/P EST SF 10 MIN: CPT | Performed by: INTERNAL MEDICINE

## 2019-03-12 ASSESSMENT — MIFFLIN-ST. JEOR: SCORE: 1701.2

## 2019-03-12 NOTE — NURSING NOTE
Patient ID checked with name and date of birth. Reviewed allergies and home medications. Took Vitals and brief medical history.    made the changes to her CPAP that were ordered and notified JOSE ALBERTO

## 2019-03-12 NOTE — PROGRESS NOTES
She is sent by Gina Howard for consultation regarding obstructive sleep apnea    Chief Complaint   Patient presents with     Sleep Problem     [facility]       Mattie Carranza comes in today for follow-up of their moderate sleep apnea, managed with CPAP.     She initially presented in 2012 when she had a seizure and was hospitalized for 4 days. During that workup the issue of sleep apnea came up. She has had some difficulty with sleep onset which resolved with trazodone and Klonopin. In addition she has some snoring, daytime fatigue (ESS 2). She also has previously been noted to have sleep eating.     Sleep study 8/2012 (227#) AHI 41 events per hour with a low SAT of 69%. Titrated to 13 cm nasal CPAP and later went on an autotitrating unit from 5 to 13 cm.     Due to 'difficulty tolerating CPAP' she had another sleep study at [facility] 12/05/2013 (228 pounds). No slow-wave or REM sleep. Apnea-hypopnea index of 15 events per hour. Low saturation was 83%. The patient was started on nasal CPAP and titrated to a level of 9 cm, which worked quite well.     Due to 'difficult time tolerating CPAP' had another sleep study at [facility] 3/22/17 (251#) with an AHI 26.7/hour and lowest O2 was 82%    She was seen at [facility] 3/12/2019 for continuing care    Initial sleep intake questionnaire 3/6/19 at [facility] notable for  - possible restless leg syndrome symptoms.  She has pains in her legs that awakens her at night, but there is no urge to move.   - ESS 7  - Sleep schedule 10 PM - 6 AM    10 point ROS negative with exceptions noted above and below.   Changes in vision YES  Post-nasal drip YES  Rapid or irregular heart beat YES  Swelling in feet or legs YES  Headaches YES  Weakness in arms or legs YES  Numbness in arms of legs YES  Rashes YES  New moles or skin changes YES  Dry cough YES  Swollen lymph nodes YES  Hard, dry stools (constipation) YES  Urinate (pee) more  "than normal YES  Muscle pain YES  Joint or bone pain YES  Swollen joints YES  Depression YES  Anxiety YES    Overall, she rates the experience with PAP as 9 (0 poor, 10 great). The mask is comfortable.    The mask is not leaking .  She is snoring with the mask on mostly on her back. She is not having gasp arousals.  She is having significant oral/nasal dryness. The pressure is not comfortable. The pressure is uncomfortable because of \"not sleeping well\".    Her PAP interface is Full Face Mask.    Bedtime is typically 2300. Usually it takes about 10 min minutes to fall asleep with the mask on. Wake time is typically 0700.  Patient is using PAP therapy 7 hours per night. The patient is usually getting 7 hours of sleep per night.    She does not feel rested in the morning.    Total score - [place]: 4 (3/12/2019  9:08 AM)      No Data Recorded    ResMed   Auto-PAP 12 - 17 cmH2O 30 day usage data:    87% of days with > 4 hours of use. 0/30 days with no use.   Average use 5' 17 minutes per day.   95%ile Leak 14.6 L/min.   CPAP 95% pressure 16.9 cm.   AHI 2.6 events per hour.     She forgets to put back on after bathroom breaks.     Recent Labs   Lab Test 08/04/18  0702 08/03/18  0645 02/02/16  2218 02/18/15  0937   NA  --   --  143 140   POTASSIUM  --   --  3.7 4.2   CHLORIDE  --   --  109 107   CO2  --   --  23 30   ANIONGAP  --   --  11 3   GLC 99 140* 86 98   BUN  --   --  17 12   CR  --   --  0.65 0.70   LAURENCE  --   --  8.5 9.2           Past medical/surgical history, family history, social history, medications and allergies were reviewed.      Problem List:  Patient Active Problem List    Diagnosis Date Noted     Tobacco use disorder 03/08/2019     Priority: Medium     Fibromyalgia 03/08/2019     Priority: Medium     Morbid obesity (H) 03/08/2019     Priority: Medium     Bipolar I disorder (H) 08/03/2018     Priority: Medium     Asthma 08/03/2018     Priority: Medium     Chronic idiopathic thrombocytopenic purpura (H) " "08/03/2018     Priority: Medium     Anxiety 10/27/2015     Priority: Medium     Depression 10/27/2015     Priority: Medium     JOSE (obstructive sleep apnea)- moderate (AHI 27) 04/27/2015     Priority: Medium     Sleep study 8/30/2012 (227#) Index 41 events per hour with a low SAT of 69%. Titrated to 13 cm nasal CPAP  Sleep study [facility] 12/05/2013 (228 pounds). No slow-wave or REM sleep. Apnea-hypopnea index of 15 events per hour. Low saturation was 83%. CPAP titrated to a level of 9 cm  Sleep Study [facility] 3/22/17 (251#) AHI 26.7/hour. Low O2 82%       Diabetes mellitus, type 2 (H) 03/28/2013     Priority: Medium     Dyslipidemia 03/28/2013     Priority: Medium     Other chronic nonalcoholic liver disease 10/31/2007     Priority: Medium     Urticaria 03/08/2019     Priority: Low     Heart murmur 08/03/2018     Priority: Low     AV sclerosis       Hypothyroidism 08/03/2018     Priority: Low     Dysphonia 07/18/2014     Priority: Low     TMJ (temporomandibular joint syndrome) 07/24/2013     Priority: Low     Perennial allergic rhinitis 07/24/2013     Priority: Low     Vision changes 07/24/2013     Priority: Low     Orthostatic hypotension 07/24/2013     Priority: Low     Insomnia 09/20/2012     Priority: Low     Advanced care planning/counseling discussion 04/26/2012     Priority: Low     Irritable bowel syndrome 04/02/2009     Priority: Low        /86   Pulse 100   Resp 14   Ht 5' 8\" (1.727 m)   Wt 242 lb (109.8 kg)   SpO2 97%   BMI 36.80 kg/m      Impression/Plan:    Moderate Sleep apnea.  Tolerating PAP well. Breakthrough snoring  - Encouraged use for all sleep  -May need bilevel Pap therapy  - Weight loss essential  - Change pressures to 15-18 cmH20     Sleep onset difficulties   Managed with t#3, clonazepam, trazodone  Read the book Say Good Night To Insomnia        Mattie Carranza will follow up in about 2 year(s).     30 minutes spent with patient, all of which were spent face-to-face " counseling, consulting, coordinating plan of care.      CC:  Gina Howard ([facility]

## 2019-06-26 DIAGNOSIS — G47.33 OSA (OBSTRUCTIVE SLEEP APNEA): Primary | ICD-10-CM

## 2019-06-27 ENCOUNTER — TELEPHONE (OUTPATIENT)
Dept: ALLERGY | Facility: OTHER | Age: 64
End: 2019-06-27

## 2019-06-27 DIAGNOSIS — J30.89 PERENNIAL ALLERGIC RHINITIS: ICD-10-CM

## 2019-06-27 DIAGNOSIS — G47.33 OSA (OBSTRUCTIVE SLEEP APNEA): Primary | ICD-10-CM

## 2019-06-27 NOTE — TELEPHONE ENCOUNTER
Patient's SLIT order has .  A new order is pended for you to sign.  I noted patient can consider tapering off SLIT, per office visit note 10/19/18.  Thank you.    Sallie Reeder RN

## 2019-07-02 ENCOUNTER — HOSPITAL ENCOUNTER (OUTPATIENT)
Dept: GENERAL RADIOLOGY | Facility: HOSPITAL | Age: 64
End: 2019-07-02
Attending: NURSE PRACTITIONER
Payer: MEDICARE

## 2019-07-02 ENCOUNTER — HOSPITAL ENCOUNTER (OUTPATIENT)
Dept: MRI IMAGING | Facility: HOSPITAL | Age: 64
Discharge: HOME | End: 2019-07-02
Attending: NURSE PRACTITIONER | Admitting: NURSE PRACTITIONER
Payer: MEDICARE

## 2019-07-02 DIAGNOSIS — M54.50 LOW BACK PAIN: ICD-10-CM

## 2019-07-02 DIAGNOSIS — M25.551 PAIN OF RIGHT HIP JOINT PAIN: ICD-10-CM

## 2019-07-02 PROCEDURE — 72148 MRI LUMBAR SPINE W/O DYE: CPT | Mod: TC

## 2019-07-02 PROCEDURE — 73502 X-RAY EXAM HIP UNI 2-3 VIEWS: CPT | Mod: TC

## 2019-09-25 DIAGNOSIS — G47.33 OSA (OBSTRUCTIVE SLEEP APNEA): Primary | ICD-10-CM

## 2019-10-08 ENCOUNTER — OFFICE VISIT (OUTPATIENT)
Dept: OTOLARYNGOLOGY | Facility: OTHER | Age: 64
End: 2019-10-08
Attending: PHYSICIAN ASSISTANT
Payer: MEDICARE

## 2019-10-08 VITALS
TEMPERATURE: 96.7 F | OXYGEN SATURATION: 93 % | HEART RATE: 64 BPM | DIASTOLIC BLOOD PRESSURE: 80 MMHG | WEIGHT: 234 LBS | HEIGHT: 68 IN | BODY MASS INDEX: 35.46 KG/M2 | SYSTOLIC BLOOD PRESSURE: 120 MMHG

## 2019-10-08 DIAGNOSIS — H10.13 ALLERGIC CONJUNCTIVITIS, BILATERAL: ICD-10-CM

## 2019-10-08 DIAGNOSIS — J30.89 PERENNIAL ALLERGIC RHINITIS: Primary | ICD-10-CM

## 2019-10-08 PROCEDURE — G0463 HOSPITAL OUTPT CLINIC VISIT: HCPCS

## 2019-10-08 PROCEDURE — 99213 OFFICE O/P EST LOW 20 MIN: CPT | Performed by: PHYSICIAN ASSISTANT

## 2019-10-08 ASSESSMENT — PAIN SCALES - GENERAL: PAINLEVEL: NO PAIN (0)

## 2019-10-08 ASSESSMENT — MIFFLIN-ST. JEOR: SCORE: 1659.92

## 2019-10-08 NOTE — PROGRESS NOTES
Chief Complaint   Patient presents with     Allergies     Pt is here for slit follow up.       HPI- Mattie returns for her annual SLIT. She has been doing well since her last visit. She has been using TID dosing of SLIT. She has been on Maintenance dose since 8/2016.  Mattie has been doing well since starting allergy drops.     She has been exposed to dogs with good control. She has felt symptoms improved.   She does have continued allergy symptoms with certain times of year- more ins spring- fall. Overall she has felt improvement. Grass is still bothersome.   Reports eye burning with most of her allergy symptoms.   She does use AH during seasonal needs. '  She has veramyst for PRN use.     Denies oral itching, swelling.   Denies sinusitis. Denies congestion.      She did have reaction to something. Developed hives head to toe. She was treated w/ kenalog injection. She had felt it was fresh seafood. She did eat stuffed salmon without issue. She had otherwise eaten shrimp without concern.    She has eaten shrimp since. Reports no trouble without issue.       Past Medical History:   Diagnosis Date     Adenomatous colon polyp 10/17/2011     ADHD (attention deficit hyperactivity disorder) 10/17/2011     Atypical chest pain 8/3/2018     Degenerative disk disease 10/17/2011     Diverticulosis 10/17/2011     Dizziness 09/03/2013    BPV     Epistaxis 9/3/2013     Head trauma 7/13/2012     Psychosis (H) 2/3/2016     Seizures 07/13/2012        Allergies   Allergen Reactions     Arthrotec      GI upset     Aspirin Hives     Bupropion Nausea     pain     Codeine Nausea     Diagnostic X-Ray Materials Hives     Diazepam Other (See Comments)     Valium  Increased anxiety     Dye [Contrast Dye] Hives     IV dye, iodine containing contrast media     Iodine      Latex Hives     From gloves and elastic     Meperidine Nausea     FUMCM Dischg Summary. If given alone     Midazolam      FUMCM Dischg Summary. Patient unaware     Midazolam  "Hcl        anxiety     Other [Seasonal Allergies] Hives     Also allergic to sun.  Allergist told her she is allergic to the sun.     Penicillins Hives     Sulfa Drugs Hives     Sulfa (sulfonamide antibiotics)       Ziprasidone      FUMCM Dischg Summary. Patient unaware     Current Outpatient Medications   Medication     acetaminophen (TYLENOL) 325 MG tablet     albuterol (PROAIR HFA/PROVENTIL HFA/VENTOLIN HFA) 108 (90 Base) MCG/ACT Inhaler     cetirizine (ZYRTEC) 10 MG tablet     cholecalciferol 5000 UNITS CAPS     ClonazePAM (KLONOPIN PO)     EPINEPHrine (EPIPEN) 0.3 MG/0.3ML injection     fluticasone (FLOVENT HFA) 110 MCG/ACT Inhaler     fluticasone (VERAMYST) 27.5 MCG/SPRAY nasal spray     glucose blood VI test strips (PRECISION XTRA GLUCOSE) strip     LANCETS MISC.     LEVOTHYROXINE SODIUM PO     loratadine (CLARITIN) 10 MG tablet     Nitroglycerin (NITROSTAT SL)     order for DME     oxyCODONE IR (ROXICODONE) 5 MG tablet     Probiotic Product (PROBIOTIC FORMULA PO)     ranitidine (ZANTAC) 150 MG tablet     senna-docusate (SENOKOT-S;PERICOLACE) 8.6-50 MG per tablet     SIMVASTATIN PO     SUBLINGUAL IMMUNOTHERAPY, SLIT,     TRAZODONE HCL PO     No current facility-administered medications for this visit.       ROS: 10 point ROS neg other than the symptoms noted above in the HPI.  /80   Pulse 64   Temp 96.7  F (35.9  C) (Tympanic)   Ht 1.727 m (5' 8\")   Wt 106.1 kg (234 lb)   SpO2 93%   BMI 35.58 kg/m      General - The patient is well nourished and well developed, and appears to have good nutritional status.  Alert and oriented to person and place, answers questions and cooperates with examination appropriately. Patient has boot on and cane in exam room.   Head and Face - Normocephalic and atraumatic, with no gross asymmetry noted.  The facial nerve is intact, with strong symmetric movements.  Voice and Breathing - The patient was breathing comfortably without the use of accessory muscles. There was " no wheezing, stridor, or stertor.  The patients voice was clear and strong, and had appropriate pitch and quality.  Ears -The external auditory canals are patent, the tympanic membranes are intact without effusion, retraction or mass.  Bony landmarks are intact.  Eyes - Extraocular movements intact, and the pupils were reactive to light.  Sclera were not icteric or injected, conjunctiva were pink and moist.  Mouth - Examination of the oral cavity showed pink, healthy oral mucosa. No lesions or ulcerations noted.  The tongue was mobile and midline, and the dentition were in good condition.    Throat - The walls of the oropharynx were smooth, pink, moist, symmetric, and had no lesions or ulcerations.  The tonsillar pillars and soft palate were symmetric.  The uvula was midline on elevation.    Neck - Normal midline excursion of the laryngotracheal complex during swallowing.  Full range of motion on passive movement.  Palpation of the occipital, submental, submandibular, internal jugular chain, and supraclavicular nodes did not demonstrate any abnormal lymph nodes or masses.  Palpation of the thyroid was soft and smooth, with no nodules or goiter appreciated.  The trachea was mobile and midline.  Nose - External contour is symmetric, no gross deflection or scars.  Nasal mucosa is pink and moist with no abnormal mucus.  The septum was intact and the turbinates are enlarged.  No polyps, masses, or purulence noted on examination.       ASSESSMENT:    ICD-10-CM    1. Perennial allergic rhinitis J30.89    2. Allergic conjunctivitis, bilateral H10.13        Continue with allergy drops.   Maintain two drops daily. Refill ordered with one refill.     Continue with Zyrtec or Claritin as needed-  Annual follow up.     Quit tobacco.   Tobacco cessation was strongly encouraged.  The associated risk of head and neck cancer was discussed.  Every year of cessation offers health benefits. This was discussed with the patient today and  they voiced understanding.        Samantha Donato PA-C  ENT  [facility], [place]  [phone]

## 2019-10-08 NOTE — PATIENT INSTRUCTIONS
Continue with allergy drops.   Maintain two drops daily. Refill ordered with one refill.     Continue with Zyrtec or Claritin as needed-  Annual follow up.     Quit tobacco.   Tobacco cessation was strongly encouraged.  The associated risk of head and neck cancer was discussed.  Every year of cessation offers health benefits. This was discussed with the patient today and they voiced understanding.        Thank you for allowing Samantha Donato PA-C and our ENT team to participate in your care.  If your medications are too expensive, please give the nurse a call.  We can possibly change this medication.  If you have a scheduling or an appointment question please contact our Health Unit Coordinator at their direct line [phone].   ALL nursing questions or concerns can be directed to your ENT nurse at: [phone] [place]

## 2019-10-08 NOTE — NURSING NOTE
"Chief Complaint   Patient presents with     Allergies     Pt is here for slit follow up.       Initial /80   Pulse 64   Temp 96.7  F (35.9  C) (Tympanic)   Ht 1.727 m (5' 8\")   Wt 106.1 kg (234 lb)   SpO2 93%   BMI 35.58 kg/m   Estimated body mass index is 35.58 kg/m  as calculated from the following:    Height as of this encounter: 1.727 m (5' 8\").    Weight as of this encounter: 106.1 kg (234 lb).  Medication Reconciliation: complete  Kita Leon LPN  "

## 2019-10-08 NOTE — LETTER
10/8/2019         RE: Mattie Carranza  [address]  [place] [address]        Dear Colleague,    Thank you for referring your patient, Mattie Carranza, to the [facility]. Please see a copy of my visit note below.    Chief Complaint   Patient presents with     Allergies     Pt is here for slit follow up.       HPI- Mattie returns for her annual SLIT. She has been doing well since her last visit. She has been using TID dosing of SLIT. She has been on Maintenance dose since 8/2016.  Mattie has been doing well since starting allergy drops.     She has been exposed to dogs with good control. She has felt symptoms improved.   She does have continued allergy symptoms with certain times of year- more ins spring- fall. Overall she has felt improvement. Grass is still bothersome.   Reports eye burning with most of her allergy symptoms.   She does use AH during seasonal needs. '  She has veramyst for PRN use.     Denies oral itching, swelling.   Denies sinusitis. Denies congestion.      She did have reaction to something. Developed hives head to toe. She was treated w/ kenalog injection. She had felt it was fresh seafood. She did eat stuffed salmon without issue. She had otherwise eaten shrimp without concern.    She has eaten shrimp since. Reports no trouble without issue.       Past Medical History:   Diagnosis Date     Adenomatous colon polyp 10/17/2011     ADHD (attention deficit hyperactivity disorder) 10/17/2011     Atypical chest pain 8/3/2018     Degenerative disk disease 10/17/2011     Diverticulosis 10/17/2011     Dizziness 09/03/2013    BPV     Epistaxis 9/3/2013     Head trauma 7/13/2012     Psychosis (H) 2/3/2016     Seizures 07/13/2012        Allergies   Allergen Reactions     Arthrotec      GI upset     Aspirin Hives     Bupropion Nausea     pain     Codeine Nausea     Diagnostic X-Ray Materials Hives     Diazepam Other (See Comments)     Valium  Increased anxiety     Dye  "[Contrast Dye] Hives     IV dye, iodine containing contrast media     Iodine      Latex Hives     From gloves and elastic     Meperidine Nausea     FUMCM Dischg Summary. If given alone     Midazolam      FUMCM Dischg Summary. Patient unaware     Midazolam Hcl        anxiety     Other [Seasonal Allergies] Hives     Also allergic to sun.  Allergist told her she is allergic to the sun.     Penicillins Hives     Sulfa Drugs Hives     Sulfa (sulfonamide antibiotics)       Ziprasidone      FUMCM Dischg Summary. Patient unaware     Current Outpatient Medications   Medication     acetaminophen (TYLENOL) 325 MG tablet     albuterol (PROAIR HFA/PROVENTIL HFA/VENTOLIN HFA) 108 (90 Base) MCG/ACT Inhaler     cetirizine (ZYRTEC) 10 MG tablet     cholecalciferol 5000 UNITS CAPS     ClonazePAM (KLONOPIN PO)     EPINEPHrine (EPIPEN) 0.3 MG/0.3ML injection     fluticasone (FLOVENT HFA) 110 MCG/ACT Inhaler     fluticasone (VERAMYST) 27.5 MCG/SPRAY nasal spray     glucose blood VI test strips (PRECISION XTRA GLUCOSE) strip     LANCETS MISC.     LEVOTHYROXINE SODIUM PO     loratadine (CLARITIN) 10 MG tablet     Nitroglycerin (NITROSTAT SL)     order for DME     oxyCODONE IR (ROXICODONE) 5 MG tablet     Probiotic Product (PROBIOTIC FORMULA PO)     ranitidine (ZANTAC) 150 MG tablet     senna-docusate (SENOKOT-S;PERICOLACE) 8.6-50 MG per tablet     SIMVASTATIN PO     SUBLINGUAL IMMUNOTHERAPY, SLIT,     TRAZODONE HCL PO     No current facility-administered medications for this visit.       ROS: 10 point ROS neg other than the symptoms noted above in the HPI.  /80   Pulse 64   Temp 96.7  F (35.9  C) (Tympanic)   Ht 1.727 m (5' 8\")   Wt 106.1 kg (234 lb)   SpO2 93%   BMI 35.58 kg/m       General - The patient is well nourished and well developed, and appears to have good nutritional status.  Alert and oriented to person and place, answers questions and cooperates with examination appropriately. Patient has boot on and cane in exam " room.   Head and Face - Normocephalic and atraumatic, with no gross asymmetry noted.  The facial nerve is intact, with strong symmetric movements.  Voice and Breathing - The patient was breathing comfortably without the use of accessory muscles. There was no wheezing, stridor, or stertor.  The patients voice was clear and strong, and had appropriate pitch and quality.  Ears -The external auditory canals are patent, the tympanic membranes are intact without effusion, retraction or mass.  Bony landmarks are intact.  Eyes - Extraocular movements intact, and the pupils were reactive to light.  Sclera were not icteric or injected, conjunctiva were pink and moist.  Mouth - Examination of the oral cavity showed pink, healthy oral mucosa. No lesions or ulcerations noted.  The tongue was mobile and midline, and the dentition were in good condition.    Throat - The walls of the oropharynx were smooth, pink, moist, symmetric, and had no lesions or ulcerations.  The tonsillar pillars and soft palate were symmetric.  The uvula was midline on elevation.    Neck - Normal midline excursion of the laryngotracheal complex during swallowing.  Full range of motion on passive movement.  Palpation of the occipital, submental, submandibular, internal jugular chain, and supraclavicular nodes did not demonstrate any abnormal lymph nodes or masses.  Palpation of the thyroid was soft and smooth, with no nodules or goiter appreciated.  The trachea was mobile and midline.  Nose - External contour is symmetric, no gross deflection or scars.  Nasal mucosa is pink and moist with no abnormal mucus.  The septum was intact and the turbinates are enlarged.  No polyps, masses, or purulence noted on examination.       ASSESSMENT:    ICD-10-CM    1. Perennial allergic rhinitis J30.89    2. Allergic conjunctivitis, bilateral H10.13        Continue with allergy drops.   Maintain two drops daily. Refill ordered with one refill.     Continue with Zyrtec or  Claritin as needed-  Annual follow up.     Quit tobacco.   Tobacco cessation was strongly encouraged.  The associated risk of head and neck cancer was discussed.  Every year of cessation offers health benefits. This was discussed with the patient today and they voiced understanding.        Samantha Donato PA-C  [facility]  [phone]      Again, thank you for allowing me to participate in the care of your patient.        Sincerely,        Samantha Donato PA-C

## 2020-03-11 ENCOUNTER — TRANSFERRED RECORDS (OUTPATIENT)
Dept: HEALTH INFORMATION MANAGEMENT | Facility: CLINIC | Age: 65
End: 2020-03-11

## 2020-03-20 ENCOUNTER — TRANSFERRED RECORDS (OUTPATIENT)
Dept: HEALTH INFORMATION MANAGEMENT | Facility: CLINIC | Age: 65
End: 2020-03-20

## 2020-05-19 ENCOUNTER — TRANSFERRED RECORDS (OUTPATIENT)
Dept: HEALTH INFORMATION MANAGEMENT | Facility: CLINIC | Age: 65
End: 2020-05-19

## 2020-06-23 ENCOUNTER — TRANSFERRED RECORDS (OUTPATIENT)
Dept: HEALTH INFORMATION MANAGEMENT | Facility: CLINIC | Age: 65
End: 2020-06-23

## 2020-07-24 DIAGNOSIS — G47.33 OSA (OBSTRUCTIVE SLEEP APNEA): Primary | ICD-10-CM

## 2020-07-24 DIAGNOSIS — E66.01 MORBID OBESITY (H): ICD-10-CM

## 2020-08-14 DIAGNOSIS — J30.89 PERENNIAL ALLERGIC RHINITIS: ICD-10-CM

## 2020-08-14 NOTE — TELEPHONE ENCOUNTER
Spoke with patient regarding SLIT refill.  Advised that since patient has been doing one drop twice daily x 2 vials, per Samantha Donato, patient should continue weaning off drops and do one drop daily x 2 vials.  Although patient is not having issues with the drops, she does not feel that her symptoms are managed.  She feels that they might even be worse and wonders about starting allergy shots.  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  Advised patients on allergy shots are required to have them done at a clinic, not self-injected.  Explained that she can discuss options at her annual follow-up with Samantha Donato, which is scheduled for 10/13/20.  Further explained that she may want the patient to be allergy tested again to see where she is at, if she has new allergies, etc.  Patient verbalized understanding.  Will process refill request.    Sallie Reeder RN

## 2020-10-08 ENCOUNTER — MEDICAL CORRESPONDENCE (OUTPATIENT)
Dept: HEALTH INFORMATION MANAGEMENT | Facility: HOSPITAL | Age: 65
End: 2020-10-08

## 2020-10-09 ENCOUNTER — MEDICAL CORRESPONDENCE (OUTPATIENT)
Dept: CT IMAGING | Facility: HOSPITAL | Age: 65
End: 2020-10-09

## 2020-10-09 ENCOUNTER — TELEPHONE (OUTPATIENT)
Dept: INTERVENTIONAL RADIOLOGY/VASCULAR | Facility: HOSPITAL | Age: 65
End: 2020-10-09

## 2020-10-09 NOTE — TELEPHONE ENCOUNTER
Pt called regarding contrast allergy.  Methylprednisolone 32 mg 12 hrs and 2 hrs prior to exam called in to [facility] pharmacy in [facility].

## 2020-10-13 ENCOUNTER — OFFICE VISIT (OUTPATIENT)
Dept: OTOLARYNGOLOGY | Facility: OTHER | Age: 65
End: 2020-10-13
Attending: PHYSICIAN ASSISTANT
Payer: MEDICARE

## 2020-10-13 VITALS
HEART RATE: 58 BPM | SYSTOLIC BLOOD PRESSURE: 110 MMHG | DIASTOLIC BLOOD PRESSURE: 80 MMHG | BODY MASS INDEX: 39.53 KG/M2 | TEMPERATURE: 97.3 F | WEIGHT: 260 LBS | OXYGEN SATURATION: 97 %

## 2020-10-13 DIAGNOSIS — G47.33 OSA ON CPAP: ICD-10-CM

## 2020-10-13 DIAGNOSIS — H10.13 ALLERGIC CONJUNCTIVITIS, BILATERAL: ICD-10-CM

## 2020-10-13 DIAGNOSIS — J30.89 PERENNIAL ALLERGIC RHINITIS: Primary | ICD-10-CM

## 2020-10-13 PROCEDURE — G0463 HOSPITAL OUTPT CLINIC VISIT: HCPCS

## 2020-10-13 PROCEDURE — 99213 OFFICE O/P EST LOW 20 MIN: CPT | Performed by: PHYSICIAN ASSISTANT

## 2020-10-13 ASSESSMENT — PAIN SCALES - GENERAL: PAINLEVEL: NO PAIN (0)

## 2020-10-13 NOTE — LETTER
10/13/2020         RE: Mattie Carranza  [address] [facility] [address]        Dear Colleague,    Thank you for referring your patient, Mattie Carranza, to the [facility]. Please see a copy of my visit note below.      Chief Complaint   Patient presents with     Allergies     Follow Up SLIT       Mattie returns for her annual SLIT. She had a recent illness in the last 6 months which required hospitalization and describes sepsis.  She was treated for pneumonia and developed pneumatothorax. Remained in ICU for 1 week and slowly recovered. She does remain with complaints of tightness with breathing and has upcoming testing related.   She is completing Chest & abdomen CT due to shortness of breath.   She has several issues at this time- she is referred to pulmonology by her PCP.   In regards to SLIT. She has no oral complaints or side effects of drops. Current on one drop daily, but feels symptoms occur randomly and now more often with dog.   Maintenance dose since 8/2016. She has developed issues around a dog. She feels improvement generally with her allergy symptoms.   Spring/ summer mild symptoms.   She has been using Claritin/ Zyrtec- every few months.   She has veramyst for PRN use.     Denies oral itching, swelling.   Denies sinusitis. Denies congestion.             Past Medical History:   Diagnosis Date     Adenomatous colon polyp 10/17/2011     ADHD (attention deficit hyperactivity disorder) 10/17/2011     Atypical chest pain 8/3/2018     Degenerative disk disease 10/17/2011     Diverticulosis 10/17/2011     Dizziness 09/03/2013    BPV     Epistaxis 9/3/2013     Head trauma 7/13/2012     Psychosis (H) 2/3/2016     Seizures 07/13/2012     Current Outpatient Medications   Medication     acetaminophen (TYLENOL) 325 MG tablet     albuterol (PROAIR HFA/PROVENTIL HFA/VENTOLIN HFA) 108 (90 Base) MCG/ACT Inhaler     cetirizine (ZYRTEC) 10 MG tablet     cholecalciferol 5000 UNITS CAPS      ClonazePAM (KLONOPIN PO)     EPINEPHrine (EPIPEN) 0.3 MG/0.3ML injection     fluticasone (FLOVENT HFA) 110 MCG/ACT Inhaler     fluticasone (VERAMYST) 27.5 MCG/SPRAY nasal spray     glucose blood VI test strips (PRECISION XTRA GLUCOSE) strip     LANCETS MISC.     LEVOTHYROXINE SODIUM PO     loratadine (CLARITIN) 10 MG tablet     Nitroglycerin (NITROSTAT SL)     order for DME     SIMVASTATIN PO     SUBLINGUAL IMMUNOTHERAPY, SLIT,     TRAZODONE HCL PO     No current facility-administered medications for this visit.       ROS: 10 point ROS neg other than the symptoms noted above in the HPI.  /80   Pulse 58   Temp 97.3  F (36.3  C) (Tympanic)   Wt 117.9 kg (260 lb)   SpO2 97%   BMI 39.53 kg/m      General - The patient is well nourished and well developed, and appears to have good nutritional status.  Alert and oriented to person and place, answers questions and cooperates with examination appropriately. Patient has boot on and cane in exam room.   Head and Face - Normocephalic and atraumatic, with no gross asymmetry noted.  The facial nerve is intact, with strong symmetric movements.  Voice and Breathing - The patient was breathing comfortably without the use of accessory muscles. There was no wheezing, stridor, or stertor.  The patients voice was clear and strong, and had appropriate pitch and quality.  Ears -The external auditory canals are patent, the tympanic membranes are intact without effusion, retraction or mass.  Bony landmarks are intact.  Eyes - Extraocular movements intact, and the pupils were reactive to light.  Sclera were not icteric or injected, conjunctiva were pink and moist.  Mouth - Examination of the oral cavity showed pink, healthy oral mucosa. No lesions or ulcerations noted.  The tongue was mobile and midline, and the dentition were in good condition.    Throat - The walls of the oropharynx were smooth, pink, moist, symmetric, and had no lesions or ulcerations.  The tonsillar  pillars and soft palate were symmetric.  The uvula was midline on elevation.    Neck - Normal midline excursion of the laryngotracheal complex during swallowing.  Full range of motion on passive movement.  Palpation of the occipital, submental, submandibular, internal jugular chain, and supraclavicular nodes did not demonstrate any abnormal lymph nodes or masses.  Palpation of the thyroid was soft and smooth, with no nodules or goiter appreciated.  The trachea was mobile and midline.  Nose - External contour is symmetric, no gross deflection or scars.  Nasal mucosa is pink and moist with no abnormal mucus.  The septum was intact and the turbinates are enlarged.  No polyps, masses, or purulence noted on examination.       ASSESSMENT:    ICD-10-CM    1. Perennial allergic rhinitis  J30.89    2. Allergic conjunctivitis, bilateral  H10.13    3. JOSE on CPAP  G47.33     Z99.89            She will continue with allergy drops at this time. We will maintain her dilution, and increase her to BID.   In the spring upon her return from [place]. She will complete MQT.   Pending completion, consider continuing with SLIT or changing to SCIT.     Continue with AH at this time.   Veramyst PRN use.     She is scheduled for Chest CT and has pulmonology referral placed. Maintain these given her recent illness.     She was happy with this plan  Consent was obtained for MQT and scheduled.   Maintain CPAP.   Congratulated on quitting tobacco.     Indications for allergy testing include:   1) Confirm suspicion of allergic rhinitis due to inhalant allergies  2) Identify the offending allergen to determine specific mode of treatment  3) In the case of chronic rhinosinusitis: when symptoms are not controlled by avoidance and pharmacotherapy  4) In the Asthma patient when exacerbations may be due to perennial allergen exposure  5) Suspect food allergy  6) Otitis Media, chronic rhinitis, atopic dermatitis, Meniere disease, headache, pharyngitis  or eye symptoms    Due to allergies, modified quantitative testing (MQT) will be performed.  Signed consent was obtained, and the risks of immunotherapy were discussed, including the potential for anaphylaxis.    If immunotherapy (IT) is recommended, there is continued risk of anaphylaxis.   Anaphylaxis can cause death. The patient will need to be monitored for 30 minutes post injection.  They must present their epinephrine pen prior to injection.  Subcutaneous as well as sublingual immunotherapy (SLIT) were discussed as potential treatment options.  The patient was told SLIT is not approved by the FDA and is cash pay.  The general time frame of immunotherapy was discussed (generally 3-5 years, sometimes longer), and the basic immunology behind IT was discussed.        Again, thank you for allowing me to participate in the care of your patient.        Sincerely,        Samantha Donato PA-C

## 2020-10-13 NOTE — PROGRESS NOTES
Chief Complaint   Patient presents with     Allergies     Follow Up SLIT       Mattie returns for her annual SLIT. She had a recent illness in the last 6 months which required hospitalization and describes sepsis.  She was treated for pneumonia and developed pneumatothorax. Remained in ICU for 1 week and slowly recovered. She does remain with complaints of tightness with breathing and has upcoming testing related.   She is completing Chest & abdomen CT due to shortness of breath.   She has several issues at this time- she is referred to pulmonology by her PCP.   In regards to SLIT. She has no oral complaints or side effects of drops. Current on one drop daily, but feels symptoms occur randomly and now more often with dog.   Maintenance dose since 8/2016. She has developed issues around a dog. She feels improvement generally with her allergy symptoms.   Spring/ summer mild symptoms.   She has been using Claritin/ Zyrtec- every few months.   She has veramyst for PRN use.     Denies oral itching, swelling.   Denies sinusitis. Denies congestion.             Past Medical History:   Diagnosis Date     Adenomatous colon polyp 10/17/2011     ADHD (attention deficit hyperactivity disorder) 10/17/2011     Atypical chest pain 8/3/2018     Degenerative disk disease 10/17/2011     Diverticulosis 10/17/2011     Dizziness 09/03/2013    BPV     Epistaxis 9/3/2013     Head trauma 7/13/2012     Psychosis (H) 2/3/2016     Seizures 07/13/2012     Current Outpatient Medications   Medication     acetaminophen (TYLENOL) 325 MG tablet     albuterol (PROAIR HFA/PROVENTIL HFA/VENTOLIN HFA) 108 (90 Base) MCG/ACT Inhaler     cetirizine (ZYRTEC) 10 MG tablet     cholecalciferol 5000 UNITS CAPS     ClonazePAM (KLONOPIN PO)     EPINEPHrine (EPIPEN) 0.3 MG/0.3ML injection     fluticasone (FLOVENT HFA) 110 MCG/ACT Inhaler     fluticasone (VERAMYST) 27.5 MCG/SPRAY nasal spray     glucose blood VI test strips (PRECISION XTRA GLUCOSE) strip      SANGEETHA MISC.     LEVOTHYROXINE SODIUM PO     loratadine (CLARITIN) 10 MG tablet     Nitroglycerin (NITROSTAT SL)     order for DME     SIMVASTATIN PO     SUBLINGUAL IMMUNOTHERAPY, SLIT,     TRAZODONE HCL PO     No current facility-administered medications for this visit.       ROS: 10 point ROS neg other than the symptoms noted above in the HPI.  /80   Pulse 58   Temp 97.3  F (36.3  C) (Tympanic)   Wt 117.9 kg (260 lb)   SpO2 97%   BMI 39.53 kg/m      General - The patient is well nourished and well developed, and appears to have good nutritional status.  Alert and oriented to person and place, answers questions and cooperates with examination appropriately. Patient has boot on and cane in exam room.   Head and Face - Normocephalic and atraumatic, with no gross asymmetry noted.  The facial nerve is intact, with strong symmetric movements.  Voice and Breathing - The patient was breathing comfortably without the use of accessory muscles. There was no wheezing, stridor, or stertor.  The patients voice was clear and strong, and had appropriate pitch and quality.  Ears -The external auditory canals are patent, the tympanic membranes are intact without effusion, retraction or mass.  Bony landmarks are intact.  Eyes - Extraocular movements intact, and the pupils were reactive to light.  Sclera were not icteric or injected, conjunctiva were pink and moist.  Mouth - Examination of the oral cavity showed pink, healthy oral mucosa. No lesions or ulcerations noted.  The tongue was mobile and midline, and the dentition were in good condition.    Throat - The walls of the oropharynx were smooth, pink, moist, symmetric, and had no lesions or ulcerations.  The tonsillar pillars and soft palate were symmetric.  The uvula was midline on elevation.    Neck - Normal midline excursion of the laryngotracheal complex during swallowing.  Full range of motion on passive movement.  Palpation of the occipital, submental,  submandibular, internal jugular chain, and supraclavicular nodes did not demonstrate any abnormal lymph nodes or masses.  Palpation of the thyroid was soft and smooth, with no nodules or goiter appreciated.  The trachea was mobile and midline.  Nose - External contour is symmetric, no gross deflection or scars.  Nasal mucosa is pink and moist with no abnormal mucus.  The septum was intact and the turbinates are enlarged.  No polyps, masses, or purulence noted on examination.       ASSESSMENT:    ICD-10-CM    1. Perennial allergic rhinitis  J30.89    2. Allergic conjunctivitis, bilateral  H10.13    3. JOSE on CPAP  G47.33     Z99.89            She will continue with allergy drops at this time. We will maintain her dilution, and increase her to BID.   In the spring upon her return from [place]. She will complete MQT.   Pending completion, consider continuing with SLIT or changing to SCIT.     Continue with AH at this time.   Veramyst PRN use.     She is scheduled for Chest CT and has pulmonology referral placed. Maintain these given her recent illness.     She was happy with this plan  Consent was obtained for MQT and scheduled.   Maintain CPAP.   Congratulated on quitting tobacco.     Indications for allergy testing include:   1) Confirm suspicion of allergic rhinitis due to inhalant allergies  2) Identify the offending allergen to determine specific mode of treatment  3) In the case of chronic rhinosinusitis: when symptoms are not controlled by avoidance and pharmacotherapy  4) In the Asthma patient when exacerbations may be due to perennial allergen exposure  5) Suspect food allergy  6) Otitis Media, chronic rhinitis, atopic dermatitis, Meniere disease, headache, pharyngitis or eye symptoms    Due to allergies, modified quantitative testing (MQT) will be performed.  Signed consent was obtained, and the risks of immunotherapy were discussed, including the potential for anaphylaxis.    If immunotherapy (IT) is  recommended, there is continued risk of anaphylaxis.   Anaphylaxis can cause death. The patient will need to be monitored for 30 minutes post injection.  They must present their epinephrine pen prior to injection.  Subcutaneous as well as sublingual immunotherapy (SLIT) were discussed as potential treatment options.  The patient was told SLIT is not approved by the FDA and is cash pay.  The general time frame of immunotherapy was discussed (generally 3-5 years, sometimes longer), and the basic immunology behind IT was discussed.

## 2020-10-13 NOTE — NURSING NOTE
Went over instructions with patient for allergy skin testing.  Reviewed patients current medications and patient will avoid all contraindicated medications prior to MQT testing.  Patient verbalizes understanding.  Copy of allergy testing packet given to patient.  Patient set up appointment with [facility] for allergy skin testing and notified to call [facility] with any questions prior to testing.     Sallie Reeder RN

## 2020-10-13 NOTE — NURSING NOTE
"Chief Complaint   Patient presents with     Allergies     Follow Up SLIT       Initial /80   Pulse 58   Temp 97.3  F (36.3  C) (Tympanic)   Wt 117.9 kg (260 lb)   SpO2 97%   BMI 39.53 kg/m   Estimated body mass index is 39.53 kg/m  as calculated from the following:    Height as of 10/8/19: 1.727 m (5' 8\").    Weight as of this encounter: 117.9 kg (260 lb).  Medication Reconciliation: complete  Kita Leon LPN  "

## 2020-10-13 NOTE — PATIENT INSTRUCTIONS
Complete allergy testing this spring  Continue with allergy drops. Increase to twice a day.   Continue with antihistamines.       Thank you for allowing Samantha Donato PA-C and our ENT team to participate in your care.  If your medications are too expensive, please give the nurse a call.  We can possibly change this medication.  If you have a scheduling or an appointment question please contact our Health Unit Coordinator at their direct line [phone].   ALL nursing questions or concerns can be directed to your ENT nurse at: [phone] [place]

## 2020-10-16 ENCOUNTER — HOSPITAL ENCOUNTER (OUTPATIENT)
Dept: CT IMAGING | Facility: HOSPITAL | Age: 65
Discharge: HOME | End: 2020-10-16
Attending: NURSE PRACTITIONER | Admitting: NURSE PRACTITIONER
Payer: MEDICARE

## 2020-10-16 DIAGNOSIS — R10.9 ABDOMINAL PAIN: ICD-10-CM

## 2020-10-16 DIAGNOSIS — R14.0 ABDOMINAL BLOATING: ICD-10-CM

## 2020-10-16 DIAGNOSIS — R06.02 SOB (SHORTNESS OF BREATH): ICD-10-CM

## 2020-10-16 LAB
CREAT BLD-MCNC: 0.8 MG/DL (ref 0.52–1.04)
GFR SERPL CREATININE-BSD FRML MDRD: 72 ML/MIN/{1.73_M2}

## 2020-10-16 PROCEDURE — 74177 CT ABD & PELVIS W/CONTRAST: CPT

## 2020-10-16 PROCEDURE — 255N000002 HC RX 255 OP 636: Performed by: RADIOLOGY

## 2020-10-16 PROCEDURE — 82565 ASSAY OF CREATININE: CPT

## 2020-10-16 RX ORDER — IOPAMIDOL 612 MG/ML
100 INJECTION, SOLUTION INTRAVASCULAR ONCE
Status: COMPLETED | OUTPATIENT
Start: 2020-10-16 | End: 2020-10-16

## 2020-10-16 RX ADMIN — IOPAMIDOL 100 ML: 612 INJECTION, SOLUTION INTRAVENOUS at 16:01

## 2020-10-22 ENCOUNTER — MEDICAL CORRESPONDENCE (OUTPATIENT)
Dept: MRI IMAGING | Facility: HOSPITAL | Age: 65
End: 2020-10-22

## 2020-11-06 ENCOUNTER — HOSPITAL ENCOUNTER (OUTPATIENT)
Dept: MRI IMAGING | Facility: HOSPITAL | Age: 65
Discharge: HOME | End: 2020-11-06
Attending: ORTHOPAEDIC SURGERY | Admitting: ORTHOPAEDIC SURGERY
Payer: MEDICARE

## 2020-11-06 DIAGNOSIS — M25.561 PAIN IN RIGHT KNEE: ICD-10-CM

## 2020-11-06 PROCEDURE — 73721 MRI JNT OF LWR EXTRE W/O DYE: CPT | Mod: RT

## 2021-05-21 ENCOUNTER — OFFICE VISIT (OUTPATIENT)
Dept: OTOLARYNGOLOGY | Facility: OTHER | Age: 66
End: 2021-05-21
Attending: PHYSICIAN ASSISTANT
Payer: MEDICARE

## 2021-05-21 ENCOUNTER — OFFICE VISIT (OUTPATIENT)
Dept: ALLERGY | Facility: OTHER | Age: 66
End: 2021-05-21
Attending: PHYSICIAN ASSISTANT
Payer: MEDICARE

## 2021-05-21 VITALS
HEART RATE: 73 BPM | SYSTOLIC BLOOD PRESSURE: 120 MMHG | TEMPERATURE: 98.4 F | OXYGEN SATURATION: 93 % | BODY MASS INDEX: 34.86 KG/M2 | HEIGHT: 68 IN | WEIGHT: 230 LBS | DIASTOLIC BLOOD PRESSURE: 71 MMHG

## 2021-05-21 DIAGNOSIS — J30.89 PERENNIAL ALLERGIC RHINITIS: Primary | ICD-10-CM

## 2021-05-21 DIAGNOSIS — G47.33 OSA ON CPAP: ICD-10-CM

## 2021-05-21 DIAGNOSIS — F17.200 TOBACCO USE DISORDER: ICD-10-CM

## 2021-05-21 DIAGNOSIS — H10.13 ALLERGIC CONJUNCTIVITIS, BILATERAL: ICD-10-CM

## 2021-05-21 DIAGNOSIS — J30.2 SEASONAL ALLERGIC RHINITIS, UNSPECIFIED TRIGGER: Primary | ICD-10-CM

## 2021-05-21 PROCEDURE — 95004 PERQ TESTS W/ALRGNC XTRCS: CPT

## 2021-05-21 PROCEDURE — 99213 OFFICE O/P EST LOW 20 MIN: CPT | Mod: 25 | Performed by: PHYSICIAN ASSISTANT

## 2021-05-21 PROCEDURE — G0463 HOSPITAL OUTPT CLINIC VISIT: HCPCS | Mod: 25

## 2021-05-21 PROCEDURE — 95024 IQ TESTS W/ALLERGENIC XTRCS: CPT

## 2021-05-21 RX ORDER — VIT C/B6/B5/MAGNESIUM/HERB 173 50-5-6-5MG
1 CAPSULE ORAL DAILY
COMMUNITY

## 2021-05-21 RX ORDER — MONTELUKAST SODIUM 10 MG/1
10 TABLET ORAL AT BEDTIME
Qty: 90 TABLET | Refills: 1 | Status: SHIPPED | OUTPATIENT
Start: 2021-05-21 | End: 2021-09-22

## 2021-05-21 ASSESSMENT — ASTHMA QUESTIONNAIRES
QUESTION_2 LAST FOUR WEEKS HOW OFTEN HAVE YOU HAD SHORTNESS OF BREATH: MORE THAN ONCE A DAY
QUESTION_3 LAST FOUR WEEKS HOW OFTEN DID YOUR ASTHMA SYMPTOMS (WHEEZING, COUGHING, SHORTNESS OF BREATH, CHEST TIGHTNESS OR PAIN) WAKE YOU UP AT NIGHT OR EARLIER THAN USUAL IN THE MORNING: NOT AT ALL
QUESTION_1 LAST FOUR WEEKS HOW MUCH OF THE TIME DID YOUR ASTHMA KEEP YOU FROM GETTING AS MUCH DONE AT WORK, SCHOOL OR AT HOME: SOME OF THE TIME
ACT_TOTALSCORE: 13
QUESTION_5 LAST FOUR WEEKS HOW WOULD YOU RATE YOUR ASTHMA CONTROL: SOMEWHAT CONTROLLED
QUESTION_4 LAST FOUR WEEKS HOW OFTEN HAVE YOU USED YOUR RESCUE INHALER OR NEBULIZER MEDICATION (SUCH AS ALBUTEROL): THREE OR MORE TIMES PER DAY

## 2021-05-21 ASSESSMENT — MIFFLIN-ST. JEOR: SCORE: 1631.77

## 2021-05-21 ASSESSMENT — PAIN SCALES - GENERAL: PAINLEVEL: SEVERE PAIN (7)

## 2021-05-21 NOTE — PATIENT INSTRUCTIONS
Start Qnasl 2 sprays to each nostril daily. Hold Flonase.   Start a trial of Singulair 10 mg at night.   Continue with Claritin and/ or Zyrtec.   Start Sinus rinses.  Rinse 1-2 times daily.     Follow up in 6-8 weeks.       Thank you for allowing Samantha Donato PA-C and our ENT team to participate in your care.  If your medications are too expensive, please give the nurse a call.  We can possibly change this medication.  If you have a scheduling or an appointment question please contact our Health Unit Coordinator at [phone], Ext. [phone].    ALL nursing questions or concerns can be directed to your ENT nurse at: [phone] [place]      -Use warm distilled water and 2 packets of the salt solution that comes with the bottle, dissolve in bottle up to the 240 mL nataliia.  -Irrigate each side of your nose leaning over the sink, using 1/3 to 1/2 the volume of the bottle in each nostril every irrigation.  Irrigate 2 times daily.  -If additional rinses are needed/recommended, you may use the plan Natan Med Sinus irrigation without the use of added budesonide

## 2021-05-21 NOTE — PROGRESS NOTES
Chief Complaint   Patient presents with     Other     MQT testing     Mattie returns for recheck.   She has been improving.   She had completed 5 years of SLIT and have felt only mild improvement.   Allergy symptoms have been ongoing. Increase in post nasal drainage.   She has flonase w/   Uses Neti pot with better results.     Exacerbates in fall and around her daughters dog.     Finished home, no mold or standing water.  FA heat.    Hx of obstructive sleep apnea on  CPAP.  Had tried several masks and finally was compliant with the full face mask   No weight loss.  Last sleep study 1.5 years ago.      MQT-   Dilution #6- None  Dilution #5-thsitle, grass, oak, sherri, Alterna, penicillium, epiploicum, dust  Dilution #2- ragweed, pigweed, birch, pine, cottonwood, walnut, molds, cat, dog.       Past Medical History:   Diagnosis Date     Adenomatous colon polyp 10/17/2011     ADHD (attention deficit hyperactivity disorder) 10/17/2011     Atypical chest pain 8/3/2018     Degenerative disk disease 10/17/2011     Diverticulosis 10/17/2011     Dizziness 09/03/2013    BPV     Epistaxis 9/3/2013     Head trauma 7/13/2012     Psychosis (H) 2/3/2016     Seizures 07/13/2012        Allergies   Allergen Reactions     Arthrotec      GI upset     Aspirin Hives     Bupropion Nausea     pain     Codeine Nausea     Diagnostic X-Ray Materials Hives     Diazepam Other (See Comments)     Valium  Increased anxiety     Dye [Contrast Dye] Hives     IV dye, iodine containing contrast media     Fentanyl       (When mixed with Versed) Agitated and combative.     Iodine      Latex Hives     From gloves and elastic     Meperidine Nausea     FUMCM Dischg Summary. If given alone     Midazolam      FUMCM Dischg Summary. Patient unaware     Midazolam Hcl        anxiety     Other [Seasonal Allergies] Hives     Also allergic to sun.  Allergist told her she is allergic to the sun.     Penicillins Hives     Sulfa Drugs Hives     Sulfa (sulfonamide  "antibiotics)       Ziprasidone      FUMCM Dischg Summary. Patient unaware     Current Outpatient Medications   Medication     acetaminophen (TYLENOL) 325 MG tablet     albuterol (PROAIR HFA/PROVENTIL HFA/VENTOLIN HFA) 108 (90 Base) MCG/ACT Inhaler     cetirizine (ZYRTEC) 10 MG tablet     cholecalciferol 5000 UNITS CAPS     ClonazePAM (KLONOPIN PO)     EPINEPHrine (EPIPEN) 0.3 MG/0.3ML injection     fluticasone (FLOVENT HFA) 110 MCG/ACT Inhaler     fluticasone (VERAMYST) 27.5 MCG/SPRAY nasal spray     Garlic 10 MG CAPS     glucose blood VI test strips (PRECISION XTRA GLUCOSE) strip     KRILL OIL PO     LANCETS MISC.     LEVOTHYROXINE SODIUM PO     loratadine (CLARITIN) 10 MG tablet     metFORMIN (GLUCOPHAGE) 500 MG tablet     Multiple Vitamin (MULTI VITAMIN DAILY PO)     Nitroglycerin (NITROSTAT SL)     order for DME     TRAZODONE HCL PO     Turmeric 500 MG CAPS     vitamin C (ASCORBIC ACID) 250 MG TABS tablet     Zinc 25 MG TABS     SUBLINGUAL IMMUNOTHERAPY, SLIT,     No current facility-administered medications for this visit.       ROS: 10 point ROS neg other than the symptoms noted above in the HPI.  /71 (BP Location: Right arm, Patient Position: Sitting, Cuff Size: Adult Large)   Pulse 73   Temp 98.4  F (36.9  C)   Ht 1.727 m (5' 8\")   Wt 104.3 kg (230 lb)   SpO2 93%   BMI 34.97 kg/m    General - The patient is well nourished and well developed, and appears to have good nutritional status.  Alert and oriented to person and place, answers questions and cooperates with examination appropriately.   Head and Face - Normocephalic and atraumatic, with no gross asymmetry noted.  The facial nerve is intact, with strong symmetric movements.  Voice and Breathing - The patient was breathing comfortably without the use of accessory muscles. There was no wheezing, stridor, or stertor.  The patients voice was clear and strong, and had appropriate pitch and quality.  Ears -The external auditory canals are patent, " the tympanic membranes are intact without effusion, retraction or mass.  Bony landmarks are intact.  Eyes - Extraocular movements intact, and the pupils were reactive to light.  Sclera were not icteric or injected, conjunctiva were pink and moist.  Mouth - Examination of the oral cavity showed pink, healthy oral mucosa. No lesions or ulcerations noted.  The tongue was mobile and midline, and the dentition were in good condition.    Throat - The walls of the oropharynx were smooth, pink, moist, symmetric, and had no lesions or ulcerations.  The tonsillar pillars and soft palate were symmetric.  The uvula was midline on elevation.  [place] Tongue Position III, low palate, large tongue   Neck - Normal midline excursion of the laryngotracheal complex during swallowing.  Full range of motion on passive movement.  Palpation of the occipital, submental, submandibular, internal jugular chain, and supraclavicular nodes did not demonstrate any abnormal lymph nodes or masses.  Palpation of the thyroid was soft and smooth, with no nodules or goiter appreciated.  The trachea was mobile and midline.  Nose - External contour is symmetric, no gross deflection or scars.  Nasal mucosa is pink and moist with no abnormal mucus.  The septum was intact, turbinates moderate hypertrophy and boggy nasal mucosa.  No polyps, masses, or purulence noted on examination.       ASSESSMENT:    ICD-10-CM    1. Perennial allergic rhinitis  J30.89 beclomethasone (QNASL) 80 MCG/ACT nasal aerosol     montelukast (SINGULAIR) 10 MG tablet   2. Allergic conjunctivitis, bilateral  H10.13 beclomethasone (QNASL) 80 MCG/ACT nasal aerosol   3. JOSE on CPAP  G47.33     Z99.89    4. Tobacco use disorder  F17.200          Start Qnasl 2 sprays to each nostril daily. Hold Flonase.   Start a trial of Singulair 10 mg at night.   Continue with Claritin and/ or Zyrtec.   Start Sinus rinses.  Rinse 1-2 times daily.     Follow up in 6-8 weeks.   Use nasal saline as  discussed today. Over the counter Chriss med saline irrigation or netti pot using high volume irrigation (30-50 ml each nostril 2x a day) is recommended.  Try to use hypertonic saline which is 2 packages in 1 chriss med bottle per instructions on bottle.  Use this at least 2 times a day, blow your nose gently, then apply any other nasal medication that may have been prescribed today (nasal steroids or nasal antihistamines). Start Qnasl. Consider trial of Dymista if there is no improvement.     Take your antihistamine daily (claritin)     Allergen avoidance measures were discussed and are important in preventing symptoms from occurring or worsening.    Continue with CPAP use.   Risks of untreated sleep apnea are well documented, including but not limited to, the inability to reach restorative sleep leading to daytime somnolence, fatigue, irritability and poor work performance, risk of motor vehicle accidents, depression, memory issues, waking headaches, impotence, and increased nocturia.  More serious risks include concerns with medication/narcotic use and surgery related to the use of anesthesia, coronary artery disease, heart failure, heart attack, stroke and sudden death.    Achieving a healthy weight, adhering to a healthy diet, and exercise are very important in the treatment of sleep apnea and were discussed and encouraged.    Clinical evidence shows CPAP to be the treatment of choice for obstructive sleep apnea. However, if CPAP is not tolerated after reasonable attempts at treatment, surgical intervention may be necessary.     She needs to follow up Primary Care for her COPD and pulmonary.   Consider completing PFT.       Tobacco cessation was strongly encouraged.  The associated risk of head and neck cancer was discussed.  Every year of cessation offers health benefits. This was discussed with the patient today and they voiced understanding.  Quit tools and a nicotine patch were offered.        Samantha Donato  GÓMEZ  ENT  [facility] [place]  [phone]

## 2021-05-21 NOTE — LETTER
5/21/2021         RE: Mattie Carranza  [address] [facility] [address]        Dear Colleague,    Thank you for referring your patient, Mattie Carranza, to the [facility]. Please see a copy of my visit note below.    Chief Complaint   Patient presents with     Other     MQT testing     Mattie returns for recheck.   She has been improving.   She had completed 5 years of SLIT and have felt only mild improvement.   Allergy symptoms have been ongoing. Increase in post nasal drainage.   She has flonase w/   Uses Neti pot with better results.     Exacerbates in fall and around her daughters dog.     Finished home, no mold or standing water.  FA heat.    Hx of obstructive sleep apnea on  CPAP.  Had tried several masks and finally was compliant with the full face mask   No weight loss.  Last sleep study 1.5 years ago.      MQT-   Dilution #6- None  Dilution #5-thsitle, grass, oak, sherri, Alterna, penicillium, epiploicum, dust  Dilution #2- ragweed, pigweed, birch, pine, cottonwood, walnut, molds, cat, dog.       Past Medical History:   Diagnosis Date     Adenomatous colon polyp 10/17/2011     ADHD (attention deficit hyperactivity disorder) 10/17/2011     Atypical chest pain 8/3/2018     Degenerative disk disease 10/17/2011     Diverticulosis 10/17/2011     Dizziness 09/03/2013    BPV     Epistaxis 9/3/2013     Head trauma 7/13/2012     Psychosis (H) 2/3/2016     Seizures 07/13/2012        Allergies   Allergen Reactions     Arthrotec      GI upset     Aspirin Hives     Bupropion Nausea     pain     Codeine Nausea     Diagnostic X-Ray Materials Hives     Diazepam Other (See Comments)     Valium  Increased anxiety     Dye [Contrast Dye] Hives     IV dye, iodine containing contrast media     Fentanyl       (When mixed with Versed) Agitated and combative.     Iodine      Latex Hives     From gloves and elastic     Meperidine Nausea     FUMCM Dischg Summary. If given alone     Midazolam      FUMCM  "Dischg Summary. Patient unaware     Midazolam Hcl        anxiety     Other [Seasonal Allergies] Hives     Also allergic to sun.  Allergist told her she is allergic to the sun.     Penicillins Hives     Sulfa Drugs Hives     Sulfa (sulfonamide antibiotics)       Ziprasidone      FUMCM Dischg Summary. Patient unaware     Current Outpatient Medications   Medication     acetaminophen (TYLENOL) 325 MG tablet     albuterol (PROAIR HFA/PROVENTIL HFA/VENTOLIN HFA) 108 (90 Base) MCG/ACT Inhaler     cetirizine (ZYRTEC) 10 MG tablet     cholecalciferol 5000 UNITS CAPS     ClonazePAM (KLONOPIN PO)     EPINEPHrine (EPIPEN) 0.3 MG/0.3ML injection     fluticasone (FLOVENT HFA) 110 MCG/ACT Inhaler     fluticasone (VERAMYST) 27.5 MCG/SPRAY nasal spray     Garlic 10 MG CAPS     glucose blood VI test strips (PRECISION XTRA GLUCOSE) strip     KRILL OIL PO     LANCETS MISC.     LEVOTHYROXINE SODIUM PO     loratadine (CLARITIN) 10 MG tablet     metFORMIN (GLUCOPHAGE) 500 MG tablet     Multiple Vitamin (MULTI VITAMIN DAILY PO)     Nitroglycerin (NITROSTAT SL)     order for DME     TRAZODONE HCL PO     Turmeric 500 MG CAPS     vitamin C (ASCORBIC ACID) 250 MG TABS tablet     Zinc 25 MG TABS     SUBLINGUAL IMMUNOTHERAPY, SLIT,     No current facility-administered medications for this visit.       ROS: 10 point ROS neg other than the symptoms noted above in the HPI.  /71 (BP Location: Right arm, Patient Position: Sitting, Cuff Size: Adult Large)   Pulse 73   Temp 98.4  F (36.9  C)   Ht 1.727 m (5' 8\")   Wt 104.3 kg (230 lb)   SpO2 93%   BMI 34.97 kg/m    General - The patient is well nourished and well developed, and appears to have good nutritional status.  Alert and oriented to person and place, answers questions and cooperates with examination appropriately.   Head and Face - Normocephalic and atraumatic, with no gross asymmetry noted.  The facial nerve is intact, with strong symmetric movements.  Voice and Breathing - The " patient was breathing comfortably without the use of accessory muscles. There was no wheezing, stridor, or stertor.  The patients voice was clear and strong, and had appropriate pitch and quality.  Ears -The external auditory canals are patent, the tympanic membranes are intact without effusion, retraction or mass.  Bony landmarks are intact.  Eyes - Extraocular movements intact, and the pupils were reactive to light.  Sclera were not icteric or injected, conjunctiva were pink and moist.  Mouth - Examination of the oral cavity showed pink, healthy oral mucosa. No lesions or ulcerations noted.  The tongue was mobile and midline, and the dentition were in good condition.    Throat - The walls of the oropharynx were smooth, pink, moist, symmetric, and had no lesions or ulcerations.  The tonsillar pillars and soft palate were symmetric.  The uvula was midline on elevation.  Santacruz Tongue Position III, low palate, large tongue   Neck - Normal midline excursion of the laryngotracheal complex during swallowing.  Full range of motion on passive movement.  Palpation of the occipital, submental, submandibular, internal jugular chain, and supraclavicular nodes did not demonstrate any abnormal lymph nodes or masses.  Palpation of the thyroid was soft and smooth, with no nodules or goiter appreciated.  The trachea was mobile and midline.  Nose - External contour is symmetric, no gross deflection or scars.  Nasal mucosa is pink and moist with no abnormal mucus.  The septum was intact, turbinates moderate hypertrophy and boggy nasal mucosa.  No polyps, masses, or purulence noted on examination.       ASSESSMENT:    ICD-10-CM    1. Perennial allergic rhinitis  J30.89 beclomethasone (QNASL) 80 MCG/ACT nasal aerosol     montelukast (SINGULAIR) 10 MG tablet   2. Allergic conjunctivitis, bilateral  H10.13 beclomethasone (QNASL) 80 MCG/ACT nasal aerosol   3. JOSE on CPAP  G47.33     Z99.89    4. Tobacco use disorder  F17.200           Start Qnasl 2 sprays to each nostril daily. Hold Flonase.   Start a trial of Singulair 10 mg at night.   Continue with Claritin and/ or Zyrtec.   Start Sinus rinses.  Rinse 1-2 times daily.     Follow up in 6-8 weeks.   Use nasal saline as discussed today. Over the counter Chriss med saline irrigation or netti pot using high volume irrigation (30-50 ml each nostril 2x a day) is recommended.  Try to use hypertonic saline which is 2 packages in 1 chriss med bottle per instructions on bottle.  Use this at least 2 times a day, blow your nose gently, then apply any other nasal medication that may have been prescribed today (nasal steroids or nasal antihistamines). Start Qnasl. Consider trial of Dymista if there is no improvement.     Take your antihistamine daily (claritin)     Allergen avoidance measures were discussed and are important in preventing symptoms from occurring or worsening.    Continue with CPAP use.   Risks of untreated sleep apnea are well documented, including but not limited to, the inability to reach restorative sleep leading to daytime somnolence, fatigue, irritability and poor work performance, risk of motor vehicle accidents, depression, memory issues, waking headaches, impotence, and increased nocturia.  More serious risks include concerns with medication/narcotic use and surgery related to the use of anesthesia, coronary artery disease, heart failure, heart attack, stroke and sudden death.    Achieving a healthy weight, adhering to a healthy diet, and exercise are very important in the treatment of sleep apnea and were discussed and encouraged.    Clinical evidence shows CPAP to be the treatment of choice for obstructive sleep apnea. However, if CPAP is not tolerated after reasonable attempts at treatment, surgical intervention may be necessary.     She needs to follow up Primary Care for her COPD and pulmonary.   Consider completing PFT.       Tobacco cessation was strongly encouraged.  The  associated risk of head and neck cancer was discussed.  Every year of cessation offers health benefits. This was discussed with the patient today and they voiced understanding.  Quit tools and a nicotine patch were offered.        Samantha Donato PA-C  [facility], [place]  [phone]        Again, thank you for allowing me to participate in the care of your patient.        Sincerely,        Samantha Donato PA-C

## 2021-05-21 NOTE — PROGRESS NOTES
Mattie was seen for allergy skin testing. Patient was seen by this nurse in conjunction with ENT provider. All encounter details are documented in ENT Provider's appointment from this same date. Please see referenced encounter for this visits documentation.     Enzo Park RN on 5/21/2021 at 3:51 PM

## 2021-05-21 NOTE — NURSING NOTE
"Chief Complaint   Patient presents with     Other     MQT testing       Initial /71 (BP Location: Right arm, Patient Position: Sitting, Cuff Size: Adult Large)   Pulse 73   Temp 98.4  F (36.9  C)   Ht 1.727 m (5' 8\")   Wt 104.3 kg (230 lb)   SpO2 93%   BMI 34.97 kg/m   Estimated body mass index is 34.97 kg/m  as calculated from the following:    Height as of this encounter: 1.727 m (5' 8\").    Weight as of this encounter: 104.3 kg (230 lb).    This patient presents today for allergy skin testing.      Symptoms have included itchy eyes, itchy nose, PND, sore throat and throat clearing, and occassionally hives from \"unkown spices\".  Grass bothers her and and being around dogs, dust mites.  She has noticed some reactive airway issues when she sprays her plants with deer repellent. Last June 2020 she had pneumonia with sepsis and was hospitalized in the I  ICU.  She developed a pneumothorax   She has asthma and scored 14 on the ACT.  She denies any previous h/o ear surgeries, sinus surgeries or tonsillectomy or adenoid surgeries.  She has developed large hives when she eats spices. Symptoms are worse in spring/summer season.     This patient lives in a single family home, built 21 years ago with a basement.  There are no concerns of mold, water or moisture issues in the home. [place] [facility]  There is carpet in the home, and also in bedroom.  Home has electric and baseboard  heat and they have a mounted wall unit that has heat and air conditioning.    This patient has no pets living in the home. Her children have dogs and they are at her home intermittently.  The dogs do not sleep with her.    This patient has had allergy testing in the past.  She has been on maintenance SLIT dosing since 2016.  She ran out of her SLIT drops in  March and hasn't used any since then.  She said in general she feel that her SLIT therapy has only given her \"slight improvement.    This patient's " medications have been reviewed prior to testing and all appropriate medications have been stopped.    Consent is signed by patient and signature is verified.     MQT/ID test is performed per protocol.  The patient tolerated testing well.  Benadryl gel was applied to each site post testing, and she was given an oral dose of Claritin 10mg for post testing itch.  All findings are recorded on the paper flow sheet. Results are reviewed with this patient.  They are given written information regarding allergy.       The patient will follow-up with Samantha Donato PA-C for treatment plan.

## 2021-05-22 ASSESSMENT — ASTHMA QUESTIONNAIRES: ACT_TOTALSCORE: 13

## 2021-05-25 ENCOUNTER — TELEPHONE (OUTPATIENT)
Dept: OTOLARYNGOLOGY | Facility: OTHER | Age: 66
End: 2021-05-25

## 2021-05-25 DIAGNOSIS — J44.9 CHRONIC OBSTRUCTIVE PULMONARY DISEASE, UNSPECIFIED COPD TYPE (H): Primary | ICD-10-CM

## 2021-05-25 RX ORDER — ALBUTEROL SULFATE 0.63 MG/3ML
1 SOLUTION RESPIRATORY (INHALATION) EVERY 6 HOURS PRN
Qty: 90 ML | Refills: 0 | Status: SHIPPED | OUTPATIENT
Start: 2021-05-25 | End: 2021-07-07

## 2021-05-25 NOTE — TELEPHONE ENCOUNTER
Pt calls and states that she feels like she needs a nebulizer at times.  She was on the Albuterol nebs in the past. She has the nebulizer machine but would need a new mouth piece chamber.  She will need the albuterol to go to [facility] and the mouthpiece to go to [facility].  If the  will not cover the Albuterol, she will need it sent to [facility] in [facility] [place] [facility].  Please advise.

## 2021-06-02 ENCOUNTER — TELEPHONE (OUTPATIENT)
Dept: HOME HEALTH SERVICES | Facility: CLINIC | Age: 66
End: 2021-06-02

## 2021-06-02 ENCOUNTER — MEDICAL CORRESPONDENCE (OUTPATIENT)
Dept: HEALTH INFORMATION MANAGEMENT | Facility: CLINIC | Age: 66
End: 2021-06-02

## 2021-06-08 ENCOUNTER — TELEPHONE (OUTPATIENT)
Dept: OTOLARYNGOLOGY | Facility: OTHER | Age: 66
End: 2021-06-08

## 2021-06-08 NOTE — TELEPHONE ENCOUNTER
Mattie would like more albuterol prescribed.  She says she will be out before her appt 7/7.  Also, she wondered if she could start back on her allergy meds? Thank you/.

## 2021-06-17 ENCOUNTER — TELEPHONE (OUTPATIENT)
Dept: OTOLARYNGOLOGY | Facility: OTHER | Age: 66
End: 2021-06-17

## 2021-06-17 DIAGNOSIS — J30.89 PERENNIAL ALLERGIC RHINITIS: ICD-10-CM

## 2021-06-17 DIAGNOSIS — T78.40XD ALLERGIC REACTION, SUBSEQUENT ENCOUNTER: ICD-10-CM

## 2021-06-17 RX ORDER — EPINEPHRINE 0.3 MG/.3ML
0.3 INJECTION SUBCUTANEOUS
Qty: 2 EACH | Refills: 1 | Status: SHIPPED | OUTPATIENT
Start: 2021-06-17 | End: 2022-06-14

## 2021-06-17 NOTE — TELEPHONE ENCOUNTER
Patient states that she would really like to get started back on allergy drops.  Although she didn't think she was getting relief in the past, she notes that she was because she feels worse at this time.  She states Singulair is helping some, but she is needing her albuterol inhaler more.  She will be seeing you in July, but asked about starting drops in the meantime.  She doesn't care if it is the same as before or if they are mixed from her recent test.  She is going to see if her epi-pens are current.  I told her we can send a refill to her pharmacy ([facility]).  Please advise.  Thank you.    Sallie Reeder RN

## 2021-06-17 NOTE — TELEPHONE ENCOUNTER
Mix from new test.   She needs to follow up with her primary care as well, noted COPD to ensure stable results. TR

## 2021-06-17 NOTE — TELEPHONE ENCOUNTER
Spoke with patient.  Advised new allergy drops will be mixed based on the recent allergy test.  They will contact the patient for payment information.  Patient is out of town so unable to check her epi-pen expiration date at this time.  Advised a refill will be sent to her pharmacy, and she can pick them up if hers are .    Also advised that per SUBHA Ramos, patient should follow-up with her PCP regarding COPD.  She states she will sign a consent when she sees Samantha in July so her ENT records can be sent to her PCP so she has updated visit/treatment information to review.  Patient verbalized understanding.     Sallie Reeder RN

## 2021-07-07 ENCOUNTER — OFFICE VISIT (OUTPATIENT)
Dept: OTOLARYNGOLOGY | Facility: OTHER | Age: 66
End: 2021-07-07
Attending: PHYSICIAN ASSISTANT
Payer: MEDICARE

## 2021-07-07 VITALS
OXYGEN SATURATION: 97 % | HEIGHT: 68 IN | WEIGHT: 228 LBS | BODY MASS INDEX: 34.56 KG/M2 | SYSTOLIC BLOOD PRESSURE: 110 MMHG | HEART RATE: 74 BPM | DIASTOLIC BLOOD PRESSURE: 72 MMHG | TEMPERATURE: 97.7 F

## 2021-07-07 DIAGNOSIS — J44.9 CHRONIC OBSTRUCTIVE PULMONARY DISEASE, UNSPECIFIED COPD TYPE (H): ICD-10-CM

## 2021-07-07 DIAGNOSIS — J30.89 PERENNIAL ALLERGIC RHINITIS: Primary | ICD-10-CM

## 2021-07-07 DIAGNOSIS — G47.33 OSA ON CPAP: ICD-10-CM

## 2021-07-07 DIAGNOSIS — H10.13 ALLERGIC CONJUNCTIVITIS, BILATERAL: ICD-10-CM

## 2021-07-07 PROCEDURE — 99213 OFFICE O/P EST LOW 20 MIN: CPT | Performed by: PHYSICIAN ASSISTANT

## 2021-07-07 PROCEDURE — G0463 HOSPITAL OUTPT CLINIC VISIT: HCPCS

## 2021-07-07 RX ORDER — ALBUTEROL SULFATE 0.63 MG/3ML
1 SOLUTION RESPIRATORY (INHALATION) EVERY 6 HOURS PRN
Qty: 270 ML | Refills: 1 | Status: SHIPPED | OUTPATIENT
Start: 2021-07-07

## 2021-07-07 RX ORDER — FLUTICASONE PROPIONATE 50 MCG
2 SPRAY, SUSPENSION (ML) NASAL DAILY
Qty: 48 G | Refills: 3 | Status: SHIPPED | OUTPATIENT
Start: 2021-07-07 | End: 2023-06-21

## 2021-07-07 RX ORDER — ALBUTEROL SULFATE 0.63 MG/3ML
1 SOLUTION RESPIRATORY (INHALATION) EVERY 6 HOURS PRN
Qty: 270 ML | Refills: 1 | Status: SHIPPED | OUTPATIENT
Start: 2021-07-07 | End: 2021-07-07

## 2021-07-07 ASSESSMENT — PAIN SCALES - GENERAL: PAINLEVEL: SEVERE PAIN (6)

## 2021-07-07 ASSESSMENT — MIFFLIN-ST. JEOR: SCORE: 1622.7

## 2021-07-07 NOTE — NURSING NOTE
"Chief Complaint   Patient presents with     Allergies     Pt is here for a slit follow up.       Initial /72   Pulse 74   Temp 97.7  F (36.5  C)   Ht 1.727 m (5' 8\")   Wt 103.4 kg (228 lb)   SpO2 97%   BMI 34.67 kg/m   Estimated body mass index is 34.67 kg/m  as calculated from the following:    Height as of this encounter: 1.727 m (5' 8\").    Weight as of this encounter: 103.4 kg (228 lb).  Medication Reconciliation: complete  Lucita Crowley LPN    "

## 2021-07-07 NOTE — LETTER
7/7/2021         RE: Mattie Carranza  [address]  [place] [address]        Dear Colleague,    Thank you for [facility] your patient, [facility]ranza[facility] Please see a copy of my visit note below.    Chief Complaint   Patient presents with     Allergies     Pt is here for a slit follow up.       Patient returns to ENT for recheck.   She was last seen on 5/21/21 for MQT and at that visit, started on nasal sprays, rinses, AH. She does have COPD as well and recommended maintaining cares with PCP and pulmonology.     Today, she returns to ENT for SLIT. She has been doing better since starting SLIT. She has fairly well. She has less wheezing, congestion. She feels windy days, make symptoms worse in regards to allergies.     Exacerbates in fall and around her daughter's dog.     Finished home, no mold or standing water.  FA heat.    Hx of obstructive sleep apnea on  CPAP.  Had tried several masks and finally was compliant with the full face mask   No weight loss.  Last sleep study 1.5 years ago.       MQT- 5/21/21  Dilution #6- None  Dilution #5-thsitle, grass, oak, sherri, Alterna, penicillium, epiploicum, dust  Dilution #2- ragweed, pigweed, birch, pine, cottonwood, walnut, molds, cat, dog.       Past Medical History:   Diagnosis Date     Adenomatous colon polyp 10/17/2011     ADHD (attention deficit hyperactivity disorder) 10/17/2011     Atypical chest pain 8/3/2018     Degenerative disk disease 10/17/2011     Diverticulosis 10/17/2011     Dizziness 09/03/2013    BPV     Epistaxis 9/3/2013     Head trauma 7/13/2012     Psychosis (H) 2/3/2016     Seizures 07/13/2012        Allergies   Allergen Reactions     Arthrotec      GI upset     Aspirin Hives     Bupropion Nausea     pain     Codeine Nausea     Diagnostic X-Ray Materials Hives     Diazepam Other (See Comments)     Valium  Increased anxiety     Dye [Contrast Dye] Hives     IV dye, iodine containing contrast media      "Fentanyl       (When mixed with Versed) Agitated and combative.     Iodine      Latex Hives     From gloves and elastic     Meperidine Nausea     FUMCM Dischg Summary. If given alone     Midazolam      FUMCM Dischg Summary. Patient unaware     Midazolam Hcl        anxiety     Other [Seasonal Allergies] Hives     Also allergic to sun.  Allergist told her she is allergic to the sun.     Penicillins Hives     Sulfa Drugs Hives     Sulfa (sulfonamide antibiotics)       Ziprasidone      FUMCM Dischg Summary. Patient unaware     Current Outpatient Medications   Medication     acetaminophen (TYLENOL) 325 MG tablet     albuterol (ACCUNEB) 0.63 MG/3ML neb solution     albuterol (PROAIR HFA/PROVENTIL HFA/VENTOLIN HFA) 108 (90 Base) MCG/ACT Inhaler     beclomethasone (QNASL) 80 MCG/ACT nasal aerosol     cetirizine (ZYRTEC) 10 MG tablet     cholecalciferol 5000 UNITS CAPS     ClonazePAM (KLONOPIN PO)     EPINEPHrine (ANY BX GENERIC EQUIV) 0.3 MG/0.3ML injection 2-pack     fluticasone (FLOVENT HFA) 110 MCG/ACT Inhaler     fluticasone (VERAMYST) 27.5 MCG/SPRAY nasal spray     Garlic 10 MG CAPS     glucose blood VI test strips (PRECISION XTRA GLUCOSE) strip     KRILL OIL PO     LANCETS MISC.     LEVOTHYROXINE SODIUM PO     loratadine (CLARITIN) 10 MG tablet     metFORMIN (GLUCOPHAGE) 500 MG tablet     montelukast (SINGULAIR) 10 MG tablet     Multiple Vitamin (MULTI VITAMIN DAILY PO)     Nitroglycerin (NITROSTAT SL)     order for DME     SUBLINGUAL IMMUNOTHERAPY, SLIT,     TRAZODONE HCL PO     Turmeric 500 MG CAPS     vitamin C (ASCORBIC ACID) 250 MG TABS tablet     Zinc 25 MG TABS     No current facility-administered medications for this visit.       ROS: 10 point ROS neg other than the symptoms noted above in the HPI.  /72   Pulse 74   Temp 97.7  F (36.5  C)   Ht 1.727 m (5' 8\")   Wt 103.4 kg (228 lb)   SpO2 97%   BMI 34.67 kg/m      General - The patient is well nourished and well developed, and appears to have good " nutritional status.  Alert and oriented to person and place, answers questions and cooperates with examination appropriately.   Head and Face - Normocephalic and atraumatic, with no gross asymmetry noted.  The facial nerve is intact, with strong symmetric movements.  Voice and Breathing - The patient was breathing comfortably without the use of accessory muscles. There was no wheezing, stridor, or stertor.  The patients voice was clear and strong, and had appropriate pitch and quality.  Ears -The external auditory canals are patent, the tympanic membranes are intact without effusion, retraction or mass.  Bony landmarks are intact.  Eyes - Extraocular movements intact, and the pupils were reactive to light.  Sclera were not icteric or injected, conjunctiva were pink and moist.  Mouth - Examination of the oral cavity showed pink, healthy oral mucosa. No lesions or ulcerations noted.  The tongue was mobile and midline, and the dentition were in good condition.    Throat - The walls of the oropharynx were smooth, pink, moist, symmetric, and had no lesions or ulcerations.  The tonsillar pillars and soft palate were symmetric.  The uvula was midline on elevation.  Santacruz Tongue Position III, low palate, large tongue   Neck - Normal midline excursion of the laryngotracheal complex during swallowing.  Full range of motion on passive movement.  Palpation of the occipital, submental, submandibular, internal jugular chain, and supraclavicular nodes did not demonstrate any abnormal lymph nodes or masses.  Palpation of the thyroid was soft and smooth, with no nodules or goiter appreciated.  The trachea was mobile and midline.  Nose - External contour is symmetric, no gross deflection or scars.  Nasal mucosa is pink and moist with no abnormal mucus.  The septum was intact, turbinates moderate hypertrophy and boggy nasal mucosa.  No polyps, masses, or purulence noted on examination.    Heart- Regular rate  Lungs- Clear. No  wheeze or rales.       ASSESSMENT:    ICD-10-CM    1. Perennial allergic rhinitis  J30.89 fluticasone (FLONASE) 50 MCG/ACT nasal spray   2. Allergic conjunctivitis, bilateral  H10.13 fluticasone (FLONASE) 50 MCG/ACT nasal spray   3. Chronic obstructive pulmonary disease, unspecified COPD type (H)  J44.9 albuterol (ACCUNEB) 0.63 MG/3ML neb solution     DISCONTINUED: albuterol (ACCUNEB) 0.63 MG/3ML neb solution   4. JOSE on CPAP  G47.33     Z99.89      Overall since restarting SLIT, she has been doing well. Reports no concerns and symptom improved since her last visit.     Continue with allergy drops.   Change Qnasl to Flonase.   Albuterol nebs refilled.   Continue with Singulair  Complete pulmonary function tests and follow up w/ PCP or Dr. Whiting.     Follow up in 6 months for SLIT, allergies.        Samantha Donato PA-C  ENT  [facility], [place]          Again, thank you for allowing me to participate in the care of your patient.        Sincerely,        Samantha Donato PA-C

## 2021-07-07 NOTE — PATIENT INSTRUCTIONS
Continue with allergy drops.   Change Qnasl to Flonase.   Albuterol nebs refilled.   Continue with Singulair  Complete pulmonary function tests.     Follow up in 6 months     Thank you for allowing Samantha Donato PA-C and our ENT team to participate in your care.  If your medications are too expensive, please give the nurse a call.  We can possibly change this medication.  If you have a scheduling or an appointment question please contact our Health Unit Coordinator at [phone], Ext. [phone].    ALL nursing questions or concerns can be directed to your ENT nurse at: [phone] [place]

## 2021-07-07 NOTE — PROGRESS NOTES
Chief Complaint   Patient presents with     Allergies     Pt is here for a slit follow up.       Patient returns to ENT for recheck.   She was last seen on 5/21/21 for MQT and at that visit, started on nasal sprays, rinses, AH. She does have COPD as well and recommended maintaining cares with PCP and pulmonology.     Today, she returns to ENT for SLIT. She has been doing better since starting SLIT. She has fairly well. She has less wheezing, congestion. She feels windy days, make symptoms worse in regards to allergies.     Exacerbates in fall and around her daughter's dog.     Finished home, no mold or standing water.  FA heat.    Hx of obstructive sleep apnea on  CPAP.  Had tried several masks and finally was compliant with the full face mask   No weight loss.  Last sleep study 1.5 years ago.       MQT- 5/21/21  Dilution #6- None  Dilution #5-thsitle, grass, oak, sherri, Alterna, penicillium, epiploicum, dust  Dilution #2- ragweed, pigweed, birch, pine, cottonwood, walnut, molds, cat, dog.       Past Medical History:   Diagnosis Date     Adenomatous colon polyp 10/17/2011     ADHD (attention deficit hyperactivity disorder) 10/17/2011     Atypical chest pain 8/3/2018     Degenerative disk disease 10/17/2011     Diverticulosis 10/17/2011     Dizziness 09/03/2013    BPV     Epistaxis 9/3/2013     Head trauma 7/13/2012     Psychosis (H) 2/3/2016     Seizures 07/13/2012        Allergies   Allergen Reactions     Arthrotec      GI upset     Aspirin Hives     Bupropion Nausea     pain     Codeine Nausea     Diagnostic X-Ray Materials Hives     Diazepam Other (See Comments)     Valium  Increased anxiety     Dye [Contrast Dye] Hives     IV dye, iodine containing contrast media     Fentanyl       (When mixed with Versed) Agitated and combative.     Iodine      Latex Hives     From gloves and elastic     Meperidine Nausea     FUMCM Dischg Summary. If given alone     Midazolam      FUMCM Dischg Summary. Patient unaware      "Midazolam Hcl        anxiety     Other [Seasonal Allergies] Hives     Also allergic to sun.  Allergist told her she is allergic to the sun.     Penicillins Hives     Sulfa Drugs Hives     Sulfa (sulfonamide antibiotics)       Ziprasidone      FUMCM Dischg Summary. Patient unaware     Current Outpatient Medications   Medication     acetaminophen (TYLENOL) 325 MG tablet     albuterol (ACCUNEB) 0.63 MG/3ML neb solution     albuterol (PROAIR HFA/PROVENTIL HFA/VENTOLIN HFA) 108 (90 Base) MCG/ACT Inhaler     beclomethasone (QNASL) 80 MCG/ACT nasal aerosol     cetirizine (ZYRTEC) 10 MG tablet     cholecalciferol 5000 UNITS CAPS     ClonazePAM (KLONOPIN PO)     EPINEPHrine (ANY BX GENERIC EQUIV) 0.3 MG/0.3ML injection 2-pack     fluticasone (FLOVENT HFA) 110 MCG/ACT Inhaler     fluticasone (VERAMYST) 27.5 MCG/SPRAY nasal spray     Garlic 10 MG CAPS     glucose blood VI test strips (PRECISION XTRA GLUCOSE) strip     KRILL OIL PO     LANCETS MISC.     LEVOTHYROXINE SODIUM PO     loratadine (CLARITIN) 10 MG tablet     metFORMIN (GLUCOPHAGE) 500 MG tablet     montelukast (SINGULAIR) 10 MG tablet     Multiple Vitamin (MULTI VITAMIN DAILY PO)     Nitroglycerin (NITROSTAT SL)     order for DME     SUBLINGUAL IMMUNOTHERAPY, SLIT,     TRAZODONE HCL PO     Turmeric 500 MG CAPS     vitamin C (ASCORBIC ACID) 250 MG TABS tablet     Zinc 25 MG TABS     No current facility-administered medications for this visit.       ROS: 10 point ROS neg other than the symptoms noted above in the HPI.  /72   Pulse 74   Temp 97.7  F (36.5  C)   Ht 1.727 m (5' 8\")   Wt 103.4 kg (228 lb)   SpO2 97%   BMI 34.67 kg/m      General - The patient is well nourished and well developed, and appears to have good nutritional status.  Alert and oriented to person and place, answers questions and cooperates with examination appropriately.   Head and Face - Normocephalic and atraumatic, with no gross asymmetry noted.  The facial nerve is intact, with " strong symmetric movements.  Voice and Breathing - The patient was breathing comfortably without the use of accessory muscles. There was no wheezing, stridor, or stertor.  The patients voice was clear and strong, and had appropriate pitch and quality.  Ears -The external auditory canals are patent, the tympanic membranes are intact without effusion, retraction or mass.  Bony landmarks are intact.  Eyes - Extraocular movements intact, and the pupils were reactive to light.  Sclera were not icteric or injected, conjunctiva were pink and moist.  Mouth - Examination of the oral cavity showed pink, healthy oral mucosa. No lesions or ulcerations noted.  The tongue was mobile and midline, and the dentition were in good condition.    Throat - The walls of the oropharynx were smooth, pink, moist, symmetric, and had no lesions or ulcerations.  The tonsillar pillars and soft palate were symmetric.  The uvula was midline on elevation.  S[place]ruz Tongue Position III, low palate, large tongue   Neck - Normal midline excursion of the laryngotracheal complex during swallowing.  Full range of motion on passive movement.  Palpation of the occipital, submental, submandibular, internal jugular chain, and supraclavicular nodes did not demonstrate any abnormal lymph nodes or masses.  Palpation of the thyroid was soft and smooth, with no nodules or goiter appreciated.  The trachea was mobile and midline.  Nose - External contour is symmetric, no gross deflection or scars.  Nasal mucosa is pink and moist with no abnormal mucus.  The septum was intact, turbinates moderate hypertrophy and boggy nasal mucosa.  No polyps, masses, or purulence noted on examination.    Heart- Regular rate  Lungs- Clear. No wheeze or rales.       ASSESSMENT:    ICD-10-CM    1. Perennial allergic rhinitis  J30.89 fluticasone (FLONASE) 50 MCG/ACT nasal spray   2. Allergic conjunctivitis, bilateral  H10.13 fluticasone (FLONASE) 50 MCG/ACT nasal spray   3. Chronic  obstructive pulmonary disease, unspecified COPD type (H)  J44.9 albuterol (ACCUNEB) 0.63 MG/3ML neb solution     DISCONTINUED: albuterol (ACCUNEB) 0.63 MG/3ML neb solution   4. JOSE on CPAP  G47.33     Z99.89      Overall since restarting SLIT, she has been doing well. Reports no concerns and symptom improved since her last visit.     Continue with allergy drops.   Change Qnasl to Flonase.   Albuterol nebs refilled.   Continue with Singulair  Complete pulmonary function tests and follow up w/ PCP or Dr. Whiting.     Follow up in 6 months for SLIT, allergies.        Samantha Donato PA-C  ENT  [facility], [place]

## 2021-07-08 ENCOUNTER — TELEPHONE (OUTPATIENT)
Dept: OTOLARYNGOLOGY | Facility: OTHER | Age: 66
End: 2021-07-08

## 2021-07-08 NOTE — TELEPHONE ENCOUNTER
"Received a voicemail message from patient.  She was inquiring about a \"lung scan\" ?  I do see that a PFT is scheduled for 07/30/21, not sure if this is what she is referring to?  Also the Rx fr her albuterol nebs needs to be sent through her [facility] pharmacy.      Enzo Park RN on 7/8/2021 at 1:18 PM    "

## 2021-07-08 NOTE — TELEPHONE ENCOUNTER
I sent in Neb to [facility]. It was a duplicate order that went to [facility].   She should have her PCP order her lung scan. AMARJIT

## 2021-07-08 NOTE — TELEPHONE ENCOUNTER
Called pt she wanted to know if you could order an annual lung scan. She is a smoker and is due for a scan.  She also wanted to know if you could send her Rx to [facility] pharmacy instead.  She verified that she has a Pulmunary Function Test at the end of July.

## 2021-07-26 ENCOUNTER — MEDICAL CORRESPONDENCE (OUTPATIENT)
Dept: HEALTH INFORMATION MANAGEMENT | Facility: HOSPITAL | Age: 66
End: 2021-07-26

## 2021-07-30 ENCOUNTER — HOSPITAL ENCOUNTER (OUTPATIENT)
Dept: RESPIRATORY THERAPY | Facility: HOSPITAL | Age: 66
End: 2021-07-30
Attending: PHYSICIAN ASSISTANT
Payer: MEDICARE

## 2021-07-30 ENCOUNTER — HOSPITAL ENCOUNTER (OUTPATIENT)
Dept: GENERAL RADIOLOGY | Facility: HOSPITAL | Age: 66
End: 2021-07-30
Attending: NURSE PRACTITIONER
Payer: MEDICARE

## 2021-07-30 DIAGNOSIS — R06.02 SHORTNESS OF BREATH: ICD-10-CM

## 2021-07-30 DIAGNOSIS — Z01.818 PRE-OPERATIVE EXAM: ICD-10-CM

## 2021-07-30 DIAGNOSIS — J44.9 CHRONIC OBSTRUCTIVE PULMONARY DISEASE, UNSPECIFIED COPD TYPE (H): ICD-10-CM

## 2021-07-30 DIAGNOSIS — J44.9 COPD (CHRONIC OBSTRUCTIVE PULMONARY DISEASE) (H): ICD-10-CM

## 2021-07-30 PROCEDURE — 94010 BREATHING CAPACITY TEST: CPT | Mod: 26 | Performed by: INTERNAL MEDICINE

## 2021-07-30 PROCEDURE — 94729 DIFFUSING CAPACITY: CPT

## 2021-07-30 PROCEDURE — 94726 PLETHYSMOGRAPHY LUNG VOLUMES: CPT

## 2021-07-30 PROCEDURE — 94010 BREATHING CAPACITY TEST: CPT

## 2021-07-30 PROCEDURE — 94729 DIFFUSING CAPACITY: CPT | Mod: 26 | Performed by: INTERNAL MEDICINE

## 2021-07-30 PROCEDURE — 71046 X-RAY EXAM CHEST 2 VIEWS: CPT

## 2021-07-30 PROCEDURE — 94726 PLETHYSMOGRAPHY LUNG VOLUMES: CPT | Mod: 26 | Performed by: INTERNAL MEDICINE

## 2021-09-22 ENCOUNTER — TELEPHONE (OUTPATIENT)
Dept: OTOLARYNGOLOGY | Facility: OTHER | Age: 66
End: 2021-09-22

## 2021-09-22 DIAGNOSIS — J30.89 PERENNIAL ALLERGIC RHINITIS: ICD-10-CM

## 2021-09-22 RX ORDER — MONTELUKAST SODIUM 10 MG/1
10 TABLET ORAL AT BEDTIME
Qty: 90 TABLET | Refills: 3 | Status: SHIPPED | OUTPATIENT
Start: 2021-09-22 | End: 2022-06-14

## 2021-09-22 NOTE — TELEPHONE ENCOUNTER
Pt was notified that we will put in a medication refill for her Singulair 90 day supply.  She would also like more than 1 refill on it.  She was also notified that we did not order the chest xray and her pcp would have to discuss this with her.

## 2021-09-22 NOTE — TELEPHONE ENCOUNTER
Singulair  Last Written Prescription Date: 5/21/21  Last Fill Quantity: 90 # of Refills: 1  Last Office Visit: 7/7/21  Pt would like a 90 day supply plus 1 refill

## 2021-09-22 NOTE — TELEPHONE ENCOUNTER
3:22 PM    Reason for Call: Phone Call    Description: Patient called stating she needs refill on 90 day montelukast. She also stated she never received call regarding her chest xray from 7/30/21. Patient would like call back please     Was an appointment offered for this call? No  If yes : Appointment type              Date    Preferred method for responding to this message: Telephone Call  What is your phone number ? [phone]    If we cannot reach you directly, may we leave a detailed response at the number you provided? Yes    Can this message wait until your PCP/provider returns, if available today? YES    Britni Jean Baptiste

## 2021-10-08 ENCOUNTER — TELEPHONE (OUTPATIENT)
Dept: ALLERGY | Facility: OTHER | Age: 66
End: 2021-10-08

## 2021-10-08 NOTE — TELEPHONE ENCOUNTER
Received a fax from [facility] requesting a refill of her allergy drops.  Called patient.  She said she is doing well on the drops and denies any adverse reactions.  She said her Epi Pen is current. We will process this refill request.      Enzo Park RN on 10/8/2021 at 11:47 AM

## 2022-01-06 ENCOUNTER — TELEPHONE (OUTPATIENT)
Dept: ALLERGY | Facility: OTHER | Age: 67
End: 2022-01-06
Payer: MEDICARE

## 2022-01-06 NOTE — TELEPHONE ENCOUNTER
Patient returned my call.  She denied any adverse reactions and is tolerating the drops well.  She stated that her Epi Pen was current.  No follow up needed at this time.  She will f/u with Samantha when she returns from [place]. We will process this refill request.     Enzo Park RN on 1/6/2022 at 3:13 PM

## 2022-01-06 NOTE — TELEPHONE ENCOUNTER
Received a refill request from [facility] requesting a refill of this patient's allergy drops.  Called patient.  No answer, left a message requesting a return call.    Enzo Park RN on 1/6/2022 at 3:05 PM

## 2022-05-13 ENCOUNTER — HOSPITAL ENCOUNTER (EMERGENCY)
Facility: HOSPITAL | Age: 67
Discharge: HOME | End: 2022-05-13
Attending: NURSE PRACTITIONER | Admitting: NURSE PRACTITIONER
Payer: MEDICARE

## 2022-05-13 VITALS
TEMPERATURE: 97.6 F | SYSTOLIC BLOOD PRESSURE: 119 MMHG | OXYGEN SATURATION: 93 % | HEART RATE: 81 BPM | DIASTOLIC BLOOD PRESSURE: 81 MMHG | RESPIRATION RATE: 18 BRPM

## 2022-05-13 DIAGNOSIS — R00.2 PALPITATIONS: ICD-10-CM

## 2022-05-13 DIAGNOSIS — R11.2 NAUSEA WITH VOMITING: ICD-10-CM

## 2022-05-13 DIAGNOSIS — T50.905A ADVERSE EFFECT OF DRUG, INITIAL ENCOUNTER: Primary | ICD-10-CM

## 2022-05-13 LAB
ALBUMIN SERPL-MCNC: 4.5 G/DL (ref 3.4–5)
ALP SERPL-CCNC: 89 U/L (ref 40–150)
ALT SERPL W P-5'-P-CCNC: 58 U/L (ref 0–50)
ANION GAP SERPL CALCULATED.3IONS-SCNC: 6 MMOL/L (ref 3–14)
AST SERPL W P-5'-P-CCNC: 38 U/L (ref 0–45)
BASOPHILS # BLD AUTO: 0 10E3/UL (ref 0–0.2)
BASOPHILS NFR BLD AUTO: 0 %
BILIRUB SERPL-MCNC: 1.3 MG/DL (ref 0.2–1.3)
BUN SERPL-MCNC: 14 MG/DL (ref 7–30)
CALCIUM SERPL-MCNC: 9.5 MG/DL (ref 8.5–10.1)
CHLORIDE BLD-SCNC: 107 MMOL/L (ref 94–109)
CO2 SERPL-SCNC: 27 MMOL/L (ref 20–32)
CREAT SERPL-MCNC: 0.58 MG/DL (ref 0.52–1.04)
EOSINOPHIL # BLD AUTO: 0 10E3/UL (ref 0–0.7)
EOSINOPHIL NFR BLD AUTO: 0 %
ERYTHROCYTE [DISTWIDTH] IN BLOOD BY AUTOMATED COUNT: 13.2 % (ref 10–15)
GFR SERPL CREATININE-BSD FRML MDRD: >90 ML/MIN/1.73M2
GLUCOSE BLD-MCNC: 137 MG/DL (ref 70–99)
GLUCOSE BLDC GLUCOMTR-MCNC: 120 MG/DL (ref 70–99)
HCT VFR BLD AUTO: 48.1 % (ref 35–47)
HGB BLD-MCNC: 16 G/DL (ref 11.7–15.7)
HOLD SPECIMEN: NORMAL
IMM GRANULOCYTES # BLD: 0.1 10E3/UL
IMM GRANULOCYTES NFR BLD: 1 %
LYMPHOCYTES # BLD AUTO: 1.2 10E3/UL (ref 0.8–5.3)
LYMPHOCYTES NFR BLD AUTO: 10 %
MCH RBC QN AUTO: 29.6 PG (ref 26.5–33)
MCHC RBC AUTO-ENTMCNC: 33.3 G/DL (ref 31.5–36.5)
MCV RBC AUTO: 89 FL (ref 78–100)
MONOCYTES # BLD AUTO: 0.5 10E3/UL (ref 0–1.3)
MONOCYTES NFR BLD AUTO: 4 %
NEUTROPHILS # BLD AUTO: 11 10E3/UL (ref 1.6–8.3)
NEUTROPHILS NFR BLD AUTO: 85 %
NRBC # BLD AUTO: 0 10E3/UL
NRBC BLD AUTO-RTO: 0 /100
PLATELET # BLD AUTO: 168 10E3/UL (ref 150–450)
POTASSIUM BLD-SCNC: 4 MMOL/L (ref 3.4–5.3)
PROT SERPL-MCNC: 8.1 G/DL (ref 6.8–8.8)
RBC # BLD AUTO: 5.4 10E6/UL (ref 3.8–5.2)
SODIUM SERPL-SCNC: 140 MMOL/L (ref 133–144)
TROPONIN I SERPL HS-MCNC: 5 NG/L
TSH SERPL DL<=0.005 MIU/L-ACNC: 1.18 MU/L (ref 0.4–4)
WBC # BLD AUTO: 12.8 10E3/UL (ref 4–11)

## 2022-05-13 PROCEDURE — 96374 THER/PROPH/DIAG INJ IV PUSH: CPT

## 2022-05-13 PROCEDURE — 96361 HYDRATE IV INFUSION ADD-ON: CPT

## 2022-05-13 PROCEDURE — 258N000003 HC RX IP 258 OP 636: Performed by: NURSE PRACTITIONER

## 2022-05-13 PROCEDURE — 84484 ASSAY OF TROPONIN QUANT: CPT | Performed by: NURSE PRACTITIONER

## 2022-05-13 PROCEDURE — 99284 EMERGENCY DEPT VISIT MOD MDM: CPT | Performed by: NURSE PRACTITIONER

## 2022-05-13 PROCEDURE — 85025 COMPLETE CBC W/AUTO DIFF WBC: CPT | Performed by: NURSE PRACTITIONER

## 2022-05-13 PROCEDURE — 99284 EMERGENCY DEPT VISIT MOD MDM: CPT | Mod: 25

## 2022-05-13 PROCEDURE — 93010 ELECTROCARDIOGRAM REPORT: CPT | Performed by: INTERNAL MEDICINE

## 2022-05-13 PROCEDURE — 80053 COMPREHEN METABOLIC PANEL: CPT | Performed by: NURSE PRACTITIONER

## 2022-05-13 PROCEDURE — 250N000011 HC RX IP 250 OP 636: Performed by: NURSE PRACTITIONER

## 2022-05-13 PROCEDURE — 36415 COLL VENOUS BLD VENIPUNCTURE: CPT | Performed by: NURSE PRACTITIONER

## 2022-05-13 PROCEDURE — 93005 ELECTROCARDIOGRAM TRACING: CPT

## 2022-05-13 PROCEDURE — 84443 ASSAY THYROID STIM HORMONE: CPT | Performed by: NURSE PRACTITIONER

## 2022-05-13 RX ORDER — ONDANSETRON 2 MG/ML
4 INJECTION INTRAMUSCULAR; INTRAVENOUS EVERY 30 MIN PRN
Status: DISCONTINUED | OUTPATIENT
Start: 2022-05-13 | End: 2022-05-14 | Stop reason: HOSPADM

## 2022-05-13 RX ORDER — ONDANSETRON 4 MG/1
4 TABLET, ORALLY DISINTEGRATING ORAL EVERY 8 HOURS PRN
Qty: 10 TABLET | Refills: 0 | Status: SHIPPED | OUTPATIENT
Start: 2022-05-13

## 2022-05-13 RX ADMIN — SODIUM CHLORIDE 1000 ML: 9 INJECTION, SOLUTION INTRAVENOUS at 21:00

## 2022-05-13 RX ADMIN — ONDANSETRON 4 MG: 2 INJECTION INTRAMUSCULAR; INTRAVENOUS at 21:00

## 2022-05-13 ASSESSMENT — ENCOUNTER SYMPTOMS
ARTHRALGIAS: 1
CONSTIPATION: 0
DIZZINESS: 1
DIARRHEA: 0
LIGHT-HEADEDNESS: 1
NAUSEA: 1
PHOTOPHOBIA: 0
PALPITATIONS: 1
APPETITE CHANGE: 0
WOUND: 0
COLOR CHANGE: 0
DYSURIA: 0
RHINORRHEA: 0
FEVER: 0
HEADACHES: 1
SORE THROAT: 0
SHORTNESS OF BREATH: 0
WEAKNESS: 0
BACK PAIN: 1
FREQUENCY: 0
VOMITING: 1
CHILLS: 0
ABDOMINAL PAIN: 0
COUGH: 0
FATIGUE: 0
DIFFICULTY URINATING: 0

## 2022-05-14 NOTE — ED TRIAGE NOTES
Pt stated 45 minutes ago she started a new med called Buprenorphine. Pt stated she became nauseated, vomited, feels shaky and felt like her heart was racing. Pt is also a diabetic. Blood sugar 120 in triage. Pulse irregular in triage.

## 2022-05-14 NOTE — DISCHARGE INSTRUCTIONS
(T50.002K) Adverse effect of drug, initial encounter  (primary encounter diagnosis)  (R11.2) Nausea with vomiting  (R00.2) Palpitations  Alert, pleasant 67-year-old female with history of chronic pain who was recently started on buprenorphine 2 mg sublingual tablets presents to the emergency department for evaluation of nausea, vomiting, lightheaded, clamminess, palpitations that have started since taking her first dose.  She denies any history of chronic opioid use.  She denies any history of illicit drug use.  Symptoms and timing of onset of symptoms being within 45 minutes of taking buprenorphine most consistent with adverse reaction.  I do wonder if this could be related to the dosing as she is opioid naïve.  I would recommend she not take buprenorphine and contact the provider who prescribed these Monday morning.  She is not having any concerning symptoms of dyspnea, respiratory depression, lethargy/somnolence.  It has been at least 12 hours since she took her first dose.  Although buprenorphine has a long half-life I would anticipate symptoms to continue improving. Plan for discharge home, symptomatic treatments.  Recommend:  - Do not take anymore buprenorphine   - Use ondansetron (Zofran) 4 mg every 6-8 hours as needed for nausea, vomiting  - Stay hydrated      RETURN TO THE ED WITH NEW OR WORSENING SYMPTOMS.    FOLLOW-UP WITH YOUR PRIMARY CARE PROVIDER NEXT WEEK      Arabella Collins CNP        Results for orders placed or performed during the hospital encounter of 05/13/22   Comprehensive metabolic panel     Status: Abnormal   Result Value Ref Range    Sodium 140 133 - 144 mmol/L    Potassium 4.0 3.4 - 5.3 mmol/L    Chloride 107 94 - 109 mmol/L    Carbon Dioxide (CO2) 27 20 - 32 mmol/L    Anion Gap 6 3 - 14 mmol/L    Urea Nitrogen 14 7 - 30 mg/dL    Creatinine 0.58 0.52 - 1.04 mg/dL    Calcium 9.5 8.5 - 10.1 mg/dL    Glucose 137 (H) 70 - 99 mg/dL    Alkaline Phosphatase 89 40 - 150 U/L    AST 38 0 - 45 U/L     ALT 58 (H) 0 - 50 U/L    Protein Total 8.1 6.8 - 8.8 g/dL    Albumin 4.5 3.4 - 5.0 g/dL    Bilirubin Total 1.3 0.2 - 1.3 mg/dL    GFR Estimate >90 >60 mL/min/1.73m2   TSH with free T4 reflex     Status: Normal   Result Value Ref Range    TSH 1.18 0.40 - 4.00 mU/L   Troponin I     Status: Normal   Result Value Ref Range    Troponin I High Sensitivity 5 <54 ng/L   Glucose by meter     Status: Abnormal   Result Value Ref Range    GLUCOSE BY METER POCT 120 (H) 70 - 99 mg/dL   CBC with platelets and differential     Status: Abnormal   Result Value Ref Range    WBC Count 12.8 (H) 4.0 - 11.0 10e3/uL    RBC Count 5.40 (H) 3.80 - 5.20 10e6/uL    Hemoglobin 16.0 (H) 11.7 - 15.7 g/dL    Hematocrit 48.1 (H) 35.0 - 47.0 %    MCV 89 78 - 100 fL    MCH 29.6 26.5 - 33.0 pg    MCHC 33.3 31.5 - 36.5 g/dL    RDW 13.2 10.0 - 15.0 %    Platelet Count 168 150 - 450 10e3/uL    % Neutrophils 85 %    % Lymphocytes 10 %    % Monocytes 4 %    % Eosinophils 0 %    % Basophils 0 %    % Immature Granulocytes 1 %    NRBCs per 100 WBC 0 <1 /100    Absolute Neutrophils 11.0 (H) 1.6 - 8.3 10e3/uL    Absolute Lymphocytes 1.2 0.8 - 5.3 10e3/uL    Absolute Monocytes 0.5 0.0 - 1.3 10e3/uL    Absolute Eosinophils 0.0 0.0 - 0.7 10e3/uL    Absolute Basophils 0.0 0.0 - 0.2 10e3/uL    Absolute Immature Granulocytes 0.1 <=0.4 10e3/uL    Absolute NRBCs 0.0 10e3/uL   Extra Tube     Status: None (In process)    Narrative    The following orders were created for panel order Extra Tube.  Procedure                               Abnormality         Status                     ---------                               -----------         ------                     Extra Red Top Tube[[id number]]                               In process                   Please view results for these tests on the individual orders.   CBC with platelets differential     Status: Abnormal    Narrative    The following orders were created for panel order CBC with platelets  differential.  Procedure                               Abnormality         Status                     ---------                               -----------         ------                     CBC with platelets and d...[[id number]]  Abnormal            Final result                 Please view results for these tests on the individual orders.

## 2022-06-14 ENCOUNTER — OFFICE VISIT (OUTPATIENT)
Dept: OTOLARYNGOLOGY | Facility: OTHER | Age: 67
End: 2022-06-14
Attending: PHYSICIAN ASSISTANT
Payer: MEDICARE

## 2022-06-14 VITALS
DIASTOLIC BLOOD PRESSURE: 82 MMHG | SYSTOLIC BLOOD PRESSURE: 136 MMHG | OXYGEN SATURATION: 98 % | BODY MASS INDEX: 35.31 KG/M2 | HEART RATE: 80 BPM | HEIGHT: 68 IN | TEMPERATURE: 96.5 F | WEIGHT: 233 LBS

## 2022-06-14 DIAGNOSIS — T78.40XD ALLERGIC REACTION, SUBSEQUENT ENCOUNTER: ICD-10-CM

## 2022-06-14 DIAGNOSIS — H10.13 ALLERGIC CONJUNCTIVITIS, BILATERAL: ICD-10-CM

## 2022-06-14 DIAGNOSIS — J30.89 PERENNIAL ALLERGIC RHINITIS: ICD-10-CM

## 2022-06-14 DIAGNOSIS — R09.81 NASAL CONGESTION: Primary | ICD-10-CM

## 2022-06-14 DIAGNOSIS — G47.33 OSA ON CPAP: ICD-10-CM

## 2022-06-14 PROCEDURE — G0463 HOSPITAL OUTPT CLINIC VISIT: HCPCS

## 2022-06-14 PROCEDURE — 99213 OFFICE O/P EST LOW 20 MIN: CPT | Performed by: PHYSICIAN ASSISTANT

## 2022-06-14 RX ORDER — EPINEPHRINE 0.3 MG/.3ML
0.3 INJECTION SUBCUTANEOUS
Qty: 2 EACH | Refills: 1 | Status: SHIPPED | OUTPATIENT
Start: 2022-06-14

## 2022-06-14 RX ORDER — MONTELUKAST SODIUM 10 MG/1
10 TABLET ORAL AT BEDTIME
Qty: 90 TABLET | Refills: 3 | Status: SHIPPED | OUTPATIENT
Start: 2022-06-14 | End: 2023-06-21

## 2022-06-14 ASSESSMENT — PAIN SCALES - GENERAL: PAINLEVEL: MODERATE PAIN (4)

## 2022-06-14 NOTE — PROGRESS NOTES
Chief Complaint   Patient presents with     Allergies     Slit follow up.       Patient returns to ENT for allergy follow-up.  Patient was last seen on 7/7/2021.      Today, she returns to ENT for SLIT. She has been doing better since starting SLIT. She has fairly well since her last visit. She has felt there has been improving since being on the allergy drops.   She does have some post nasal drainage at times and rinses do aid in relief.   Her breathing has been doing well. Reports less wheezing.   Intermittent coughing.   Inhalers PRN.   No oral itching, oral swelling.     Exacerbates in fall and around her daughter's dog.     Finished home, no mold or standing water.  FA heat.    Hx of obstructive sleep apnea on  CPAP.  Had tried several masks and finally was compliant with the full face mask   No weight loss.  Last sleep study 1.5 years ago.   Pulmonary function test was completed which had minimal obstructive pattern.       MQT- 5/21/21  Dilution #6- None  Dilution #5-thsitle, grass, oak, sherri, Alterna, penicillium, epiploicum, dust  Dilution #2- ragweed, pigweed, birch, pine, cottonwood, walnut, molds, cat, dog.        Past Medical History:   Diagnosis Date     Adenomatous colon polyp 10/17/2011     ADHD (attention deficit hyperactivity disorder) 10/17/2011     Atypical chest pain 8/3/2018     Degenerative disk disease 10/17/2011     Diverticulosis 10/17/2011     Dizziness 09/03/2013    BPV     Epistaxis 9/3/2013     Head trauma 7/13/2012     Psychosis (H) 2/3/2016     Seizures 07/13/2012        Allergies   Allergen Reactions     Arthrotec      GI upset     Aspirin Hives     Bupropion Nausea     pain     Codeine Nausea     Diagnostic X-Ray Materials Hives     Diazepam Other (See Comments)     Valium  Increased anxiety     Dye [Contrast Dye] Hives     IV dye, iodine containing contrast media     Fentanyl       (When mixed with Versed) Agitated and combative.     Iodine      Latex Hives     From gloves and  "elastic     Meperidine Nausea     FUMCM Dischg Summary. If given alone     Midazolam      FUMCM Dischg Summary. Patient unaware     Midazolam Hcl        anxiety     Morphine Nausea     Other [Seasonal Allergies] Hives     Also allergic to sun.  Allergist told her she is allergic to the sun.     Penicillins Hives     Sulfa Drugs Hives     Sulfa (sulfonamide antibiotics)       Ziprasidone      FUMCM Dischg Summary. Patient unaware     Current Outpatient Medications   Medication     acetaminophen (TYLENOL) 325 MG tablet     albuterol (ACCUNEB) 0.63 MG/3ML neb solution     albuterol (PROAIR HFA/PROVENTIL HFA/VENTOLIN HFA) 108 (90 Base) MCG/ACT Inhaler     beclomethasone (QNASL) 80 MCG/ACT nasal aerosol     cetirizine (ZYRTEC) 10 MG tablet     cholecalciferol 5000 UNITS CAPS     ClonazePAM (KLONOPIN PO)     EPINEPHrine (ANY BX GENERIC EQUIV) 0.3 MG/0.3ML injection 2-pack     fluticasone (FLONASE) 50 MCG/ACT nasal spray     fluticasone (FLOVENT HFA) 110 MCG/ACT Inhaler     fluticasone (VERAMYST) 27.5 MCG/SPRAY nasal spray     Garlic 10 MG CAPS     glucose blood VI test strips (PRECISION XTRA GLUCOSE) strip     KRILL OIL PO     LANCETS MISC.     LEVOTHYROXINE SODIUM PO     loratadine (CLARITIN) 10 MG tablet     metFORMIN (GLUCOPHAGE) 500 MG tablet     montelukast (SINGULAIR) 10 MG tablet     Multiple Vitamin (MULTI VITAMIN DAILY PO)     Nitroglycerin (NITROSTAT SL)     ondansetron (ZOFRAN ODT) 4 MG ODT tab     order for DME     SUBLINGUAL IMMUNOTHERAPY, SLIT,     TRAZODONE HCL PO     Turmeric 500 MG CAPS     vitamin C (ASCORBIC ACID) 250 MG TABS tablet     Zinc 25 MG TABS     No current facility-administered medications for this visit.     ROS- SEE HPI  /82 (BP Location: Left arm, Cuff Size: Adult Large)   Pulse 80   Temp (!) 96.5  F (35.8  C) (Tympanic)   Ht 1.727 m (5' 8\")   Wt 105.7 kg (233 lb)   SpO2 98%   BMI 35.43 kg/m      General - The patient is well nourished and well developed, and appears to have " good nutritional status.  Alert and oriented to person and place, answers questions and cooperates with examination appropriately.   Head and Face - Normocephalic and atraumatic, with no gross asymmetry noted.  The facial nerve is intact, with strong symmetric movements.  Voice and Breathing - The patient was breathing comfortably without the use of accessory muscles. There was no wheezing, stridor, or stertor.  The patients voice was clear and strong, and had appropriate pitch and quality.  Ears -The external auditory canals are patent, the tympanic membranes are intact without effusion, retraction or mass.  Bony landmarks are intact.  Eyes - Extraocular movements intact, and the pupils were reactive to light.  Sclera were not icteric or injected, conjunctiva were pink and moist.  Mouth - Examination of the oral cavity showed pink, healthy oral mucosa. No lesions or ulcerations noted.  The tongue was mobile and midline, and the dentition were in good condition.    Throat - The walls of the oropharynx were smooth, pink, moist, symmetric, and had no lesions or ulcerations.  The tonsillar pillars and soft palate were symmetric.  The uvula was midline on elevation.  S[place]ruz Tongue Position III, low palate, large tongue   Neck - Normal midline excursion of the laryngotracheal complex during swallowing.  Full range of motion on passive movement.  Palpation of the occipital, submental, submandibular, internal jugular chain, and supraclavicular nodes did not demonstrate any abnormal lymph nodes or masses.  Palpation of the thyroid was soft and smooth, with no nodules or goiter appreciated.  The trachea was mobile and midline.  Nose - External contour is symmetric, no gross deflection or scars.  Nasal mucosa is pink and moist with no abnormal mucus.  The septum was intact, turbinates moderate hypertrophy and boggy nasal mucosa.  No polyps, masses, or purulence noted on examination.           ASSESSMENT/ PLAN:       ICD-10-CM    1. Nasal congestion  R09.81 beclomethasone (QNASL) 80 MCG/ACT nasal aerosol   2. Perennial allergic rhinitis  J30.89 EPINEPHrine (ANY BX GENERIC EQUIV) 0.3 MG/0.3ML injection 2-pack     montelukast (SINGULAIR) 10 MG tablet     beclomethasone (QNASL) 80 MCG/ACT nasal aerosol   3. Allergic reaction, subsequent encounter  T78.40XD EPINEPHrine (ANY BX GENERIC EQUIV) 0.3 MG/0.3ML injection 2-pack     beclomethasone (QNASL) 80 MCG/ACT nasal aerosol   4. Allergic conjunctivitis, bilateral  H10.13    5. JOSE on CPAP  G47.33     Z99.89      Patient is doing well at this time and happy with her current progress.  Continue with allergy immunotherapy.    Continue with allergy drops.   Refilled Qnasl, Singulair  Epipen sent to [facility].     Follow up in 1 year.       Samantha Donato PA-C  ENT  [facility], [place]

## 2022-06-14 NOTE — PATIENT INSTRUCTIONS
Continue with allergy drops.   Refilled Qnasl, Singulair  Epipen sent to [facility].     Follow up in 1 year.       Thank you for allowing Samantha Donato PA-C and our ENT team to participate in your care.  If your medications are too expensive, please give the nurse a call.  We can possibly change this medication.  If you have a scheduling or an appointment question please contact our Health Unit Coordinator at [phone], Ext. [phone].    ALL nursing questions or concerns can be directed to your ENT nurse at: [phone] [place]

## 2022-06-14 NOTE — LETTER
6/14/2022         RE: Mattie Carranza  [address]  [place] [address]        Dear Colleague,    Thank you for referring your patient, Mattie Carranza, to the [facility]. Please see a copy of my visit note below.    Chief Complaint   Patient presents with     Allergies     Slit follow up.       Patient returns to ENT for allergy follow-up.  Patient was last seen on 7/7/2021.      Today, she returns to ENT for SLIT. She has been doing better since starting SLIT. She has fairly well since her last visit. She has felt there has been improving since being on the allergy drops.   She does have some post nasal drainage at times and rinses do aid in relief.   Her breathing has been doing well. Reports less wheezing.   Intermittent coughing.   Inhalers PRN.   No oral itching, oral swelling.     Exacerbates in fall and around her daughter's dog.     Finished home, no mold or standing water.  FA heat.    Hx of obstructive sleep apnea on  CPAP.  Had tried several masks and finally was compliant with the full face mask   No weight loss.  Last sleep study 1.5 years ago.   Pulmonary function test was completed which had minimal obstructive pattern.       MQT- 5/21/21  Dilution #6- None  Dilution #5-thsitle, grass, oak, sherri, Alterna, penicillium, epiploicum, dust  Dilution #2- ragweed, pigweed, birch, pine, cottonwood, walnut, molds, cat, dog.        Past Medical History:   Diagnosis Date     Adenomatous colon polyp 10/17/2011     ADHD (attention deficit hyperactivity disorder) 10/17/2011     Atypical chest pain 8/3/2018     Degenerative disk disease 10/17/2011     Diverticulosis 10/17/2011     Dizziness 09/03/2013    BPV     Epistaxis 9/3/2013     Head trauma 7/13/2012     Psychosis (H) 2/3/2016     Seizures 07/13/2012        Allergies   Allergen Reactions     Arthrotec      GI upset     Aspirin Hives     Bupropion Nausea     pain     Codeine Nausea     Diagnostic X-Ray Materials Hives      Diazepam Other (See Comments)     Valium  Increased anxiety     Dye [Contrast Dye] Hives     IV dye, iodine containing contrast media     Fentanyl       (When mixed with Versed) Agitated and combative.     Iodine      Latex Hives     From gloves and elastic     Meperidine Nausea     FUMCM Dischg Summary. If given alone     Midazolam      FUMCM Dischg Summary. Patient unaware     Midazolam Hcl        anxiety     Morphine Nausea     Other [Seasonal Allergies] Hives     Also allergic to sun.  Allergist told her she is allergic to the sun.     Penicillins Hives     Sulfa Drugs Hives     Sulfa (sulfonamide antibiotics)       Ziprasidone      FUMCM Dischg Summary. Patient unaware     Current Outpatient Medications   Medication     acetaminophen (TYLENOL) 325 MG tablet     albuterol (ACCUNEB) 0.63 MG/3ML neb solution     albuterol (PROAIR HFA/PROVENTIL HFA/VENTOLIN HFA) 108 (90 Base) MCG/ACT Inhaler     beclomethasone (QNASL) 80 MCG/ACT nasal aerosol     cetirizine (ZYRTEC) 10 MG tablet     cholecalciferol 5000 UNITS CAPS     ClonazePAM (KLONOPIN PO)     EPINEPHrine (ANY BX GENERIC EQUIV) 0.3 MG/0.3ML injection 2-pack     fluticasone (FLONASE) 50 MCG/ACT nasal spray     fluticasone (FLOVENT HFA) 110 MCG/ACT Inhaler     fluticasone (VERAMYST) 27.5 MCG/SPRAY nasal spray     Garlic 10 MG CAPS     glucose blood VI test strips (PRECISION XTRA GLUCOSE) strip     KRILL OIL PO     LANCETS MISC.     LEVOTHYROXINE SODIUM PO     loratadine (CLARITIN) 10 MG tablet     metFORMIN (GLUCOPHAGE) 500 MG tablet     montelukast (SINGULAIR) 10 MG tablet     Multiple Vitamin (MULTI VITAMIN DAILY PO)     Nitroglycerin (NITROSTAT SL)     ondansetron (ZOFRAN ODT) 4 MG ODT tab     order for DME     SUBLINGUAL IMMUNOTHERAPY, SLIT,     TRAZODONE HCL PO     Turmeric 500 MG CAPS     vitamin C (ASCORBIC ACID) 250 MG TABS tablet     Zinc 25 MG TABS     No current facility-administered medications for this visit.     ROS- SEE HPI  /82 (BP Location:  "Left arm, Cuff Size: Adult Large)   Pulse 80   Temp (!) 96.5  F (35.8  C) (Tympanic)   Ht 1.727 m (5' 8\")   Wt 105.7 kg (233 lb)   SpO2 98%   BMI 35.43 kg/m      General - The patient is well nourished and well developed, and appears to have good nutritional status.  Alert and oriented to person and place, answers questions and cooperates with examination appropriately.   Head and Face - Normocephalic and atraumatic, with no gross asymmetry noted.  The facial nerve is intact, with strong symmetric movements.  Voice and Breathing - The patient was breathing comfortably without the use of accessory muscles. There was no wheezing, stridor, or stertor.  The patients voice was clear and strong, and had appropriate pitch and quality.  Ears -The external auditory canals are patent, the tympanic membranes are intact without effusion, retraction or mass.  Bony landmarks are intact.  Eyes - Extraocular movements intact, and the pupils were reactive to light.  Sclera were not icteric or injected, conjunctiva were pink and moist.  Mouth - Examination of the oral cavity showed pink, healthy oral mucosa. No lesions or ulcerations noted.  The tongue was mobile and midline, and the dentition were in good condition.    Throat - The walls of the oropharynx were smooth, pink, moist, symmetric, and had no lesions or ulcerations.  The tonsillar pillars and soft palate were symmetric.  The uvula was midline on elevation.  S[place]ruz Tongue Position III, low palate, large tongue   Neck - Normal midline excursion of the laryngotracheal complex during swallowing.  Full range of motion on passive movement.  Palpation of the occipital, submental, submandibular, internal jugular chain, and supraclavicular nodes did not demonstrate any abnormal lymph nodes or masses.  Palpation of the thyroid was soft and smooth, with no nodules or goiter appreciated.  The trachea was mobile and midline.  Nose - External contour is symmetric, no gross " deflection or scars.  Nasal mucosa is pink and moist with no abnormal mucus.  The septum was intact, turbinates moderate hypertrophy and boggy nasal mucosa.  No polyps, masses, or purulence noted on examination.           ASSESSMENT/ PLAN:      ICD-10-CM    1. Nasal congestion  R09.81 beclomethasone (QNASL) 80 MCG/ACT nasal aerosol   2. Perennial allergic rhinitis  J30.89 EPINEPHrine (ANY BX GENERIC EQUIV) 0.3 MG/0.3ML injection 2-pack     montelukast (SINGULAIR) 10 MG tablet     beclomethasone (QNASL) 80 MCG/ACT nasal aerosol   3. Allergic reaction, subsequent encounter  T78.40XD EPINEPHrine (ANY BX GENERIC EQUIV) 0.3 MG/0.3ML injection 2-pack     beclomethasone (QNASL) 80 MCG/ACT nasal aerosol   4. Allergic conjunctivitis, bilateral  H10.13    5. JOSE on CPAP  G47.33     Z99.89      Patient is doing well at this time and happy with her current progress.  Continue with allergy immunotherapy.    Continue with allergy drops.   Refilled Qnasl, Singulair  Epipen sent to [facility].     Follow up in 1 year.       Samantha Donato PA-C  ENT  [facility], [place]          Again, thank you for allowing me to participate in the care of your patient.        Sincerely,        Samantha Donato PA-C

## 2022-06-15 ENCOUNTER — TELEPHONE (OUTPATIENT)
Dept: ALLERGY | Facility: OTHER | Age: 67
End: 2022-06-15
Payer: MEDICARE

## 2022-06-15 DIAGNOSIS — J30.89 PERENNIAL ALLERGIC RHINITIS: Primary | ICD-10-CM

## 2022-06-15 NOTE — TELEPHONE ENCOUNTER
Patient needs a new order for allergy drops (SLIT.  New order pended.  Please review and sign, thank you!    Enzo Moreira RN on 6/15/2022 at 9:57 AM

## 2022-06-24 ENCOUNTER — TELEPHONE (OUTPATIENT)
Dept: ALLERGY | Facility: OTHER | Age: 67
End: 2022-06-24

## 2022-06-24 DIAGNOSIS — J30.89 PERENNIAL ALLERGIC RHINITIS: ICD-10-CM

## 2022-06-24 DIAGNOSIS — L30.9 ECZEMA, UNSPECIFIED TYPE: Primary | ICD-10-CM

## 2022-06-24 NOTE — TELEPHONE ENCOUNTER
She is having some eczema on her fingers/hands from a dish soap, she is wondering what OTC lotion you would recommend.  Please advise on that, thank you!     Enzo Moreira RN on 6/24/2022 at 10:35 AM

## 2022-06-24 NOTE — TELEPHONE ENCOUNTER
If she wishes to use OTC lotion, may try Aveeno baby- Eczema therapy. Or if she would like a rx for ointment. I could send one is as well. TR

## 2022-06-24 NOTE — TELEPHONE ENCOUNTER
Received a refill request from [facility] requesting a refill of this patient's allergy drops.  Called patient.  She denied any adverse reactions and is tolerating the drops well.  She stated that the Epi Pen was current.  We will process this refill request.     Enzo Moreira RN on 6/24/2022 at 10:33 AM

## 2022-09-30 NOTE — NURSING NOTE
"Chief Complaint   Patient presents with     Allergies     Slit follow up.       Initial /82 (BP Location: Left arm, Cuff Size: Adult Large)   Pulse 80   Temp (!) 96.5  F (35.8  C) (Tympanic)   Ht 1.727 m (5' 8\")   Wt 105.7 kg (233 lb)   SpO2 98%   BMI 35.43 kg/m   Estimated body mass index is 35.43 kg/m  as calculated from the following:    Height as of this encounter: 1.727 m (5' 8\").    Weight as of this encounter: 105.7 kg (233 lb).  Medication Reconciliation: complete  Hailey Pino LPN    "
No

## 2022-10-17 ENCOUNTER — TELEPHONE (OUTPATIENT)
Dept: ALLERGY | Facility: OTHER | Age: 67
End: 2022-10-17

## 2022-10-17 NOTE — TELEPHONE ENCOUNTER
Received a refill request from [facility] requesting a refill of this patient's allergy drops.  Called patient.  She denied any adverse reactions and are tolerating the drops well.  She stated that the Epi Pen was current.  No follow up needed at this time.  We will process this refill request.     Blanka Hu RN on 10/17/2022 at 10:49 AM

## 2022-12-19 NOTE — PLAN OF CARE
Problem: PT General Care Plan  Goal: Stairs (PT)  PT Stairs  Discharge Planner PT   Patient plan for discharge: Initial plan was to go home w/ family, however now considering TCU  Current status: Bed mobility independent,  Transfer sit to stand w/ SBA.  Pt amb w/ FWW,  L LE NWB 30' X 2. Pt need to sit to rest due to fatigue.  Pt is noting some R knee discomfort which also is affecting her ability to walk.  Unable to try stairs due to fatigue/ decreased endurance w/ walking (we were going to attempt w/ her sitting and scooting up and down)    Barriers to return to prior living situation: stairs  Recommendations for discharge: TCU or home w/ help and home therapy.  Rationale for recommendations: Patient would benefit from TCU stay to increase strength and endurance for safe return home.  Pt needs to work on stairs.         Entered by: Veena Barrios 08/04/2018 12:24 PM          No

## 2023-02-07 ENCOUNTER — TELEPHONE (OUTPATIENT)
Dept: ALLERGY | Facility: OTHER | Age: 68
End: 2023-02-07

## 2023-02-07 NOTE — TELEPHONE ENCOUNTER
Received a refill request from [facility] requesting a refill of this patient's allergy drops.  Called patient.  No answer, left a message requesting a return call.    Enzo Moreira RN on 2/7/2023 at 11:14 AM

## 2023-02-08 NOTE — TELEPHONE ENCOUNTER
Called patient.  She denied any adverse reactions and is tolerating the drops well.  She stated that her Epi Pen was current.  She will schedule her next f/u in June when she is back from [place]. No follow up needed at this time.  We will process this refill request.     Enzo Moreira RN on 2/8/2023 at 10:21 AM

## 2023-02-09 ENCOUNTER — TRANSFERRED RECORDS (OUTPATIENT)
Dept: HEALTH INFORMATION MANAGEMENT | Facility: HOSPITAL | Age: 68
End: 2023-02-09

## 2023-05-20 NOTE — PLAN OF CARE
Problem: Patient Care Overview  Goal: Plan of Care/Patient Progress Review  Outcome: No Change  Pt fell asleep waiting for additional sleep/pain med. Order. This nurse attempted to give the med.,  tapped pts arm and pt did not awaken, CPAP in use, breathing easy and regular - will not awaken as pt was anxious to get some sleep, BA on for safety.        Applied

## 2023-06-14 NOTE — PROGRESS NOTES
Chief Complaint   Patient presents with     Allergies     Slit follow up   Patient was last seen on 6/14/2022 for routine sublingual immunotherapy follow-up.  At that time she was doing well she was recommended to continue with immunotherapy, Qnasl, Singulair.    Today, Mattie has intermittent allergy seasons flares.   She has been using daily Qnasl.   She has been taking Claritin, Singulair daily w/ results.   Her breathing overall has been well. Reports no recent flares or exacerbations.       Exacerbates in fall and around her daughter's dog.     Finished home, no mold or standing water.  FA heat.    Hx of obstructive sleep apnea on  CPAP.  Had tried several masks and finally was compliant with the full face mask  No weight loss.  Last sleep study 1.5 years ago.   Pulmonary function test was completed which had minimal obstructive pattern.         MQT- 5/21/21  Dilution #6- None  Dilution #5-thsitle, grass, oak, sherri, Alterna, penicillium, epiploicum, dust  Dilution #2- ragweed, pigweed, birch, pine, cottonwood, walnut, molds, cat, dog.      She was on sublingual immunotherapy starting in 2016 maintenance 20 16-20 20 and then we repeated the M QT on 5/21/2021 and restarted new dose per M QT results.    Past Medical History:   Diagnosis Date     Adenomatous colon polyp 10/17/2011     ADHD (attention deficit hyperactivity disorder) 10/17/2011     Atypical chest pain 8/3/2018     Degenerative disk disease 10/17/2011     Diverticulosis 10/17/2011     Dizziness 09/03/2013    BPV     Epistaxis 9/3/2013     Head trauma 7/13/2012     Psychosis (H) 2/3/2016     Seizures 07/13/2012        Allergies   Allergen Reactions     Aspirin Hives     Buprenorphine      Bupropion Nausea     pain     Codeine Nausea     Diazepam Other (See Comments)     Valium  Increased anxiety     Diclofenac-Misoprostol      GI upset     Dye [Contrast Dye] Hives     IV dye, iodine containing contrast media     Fentanyl       (When mixed with Versed)  "Agitated and combative.     Iodinated Contrast Media Hives     Iodine      Latex Hives     From gloves and elastic     Meperidine Nausea     FUMCM Dischg Summary. If given alone     Midazolam      FUMCM Dischg Summary. Patient unaware     Midazolam Hcl        anxiety     Morphine Nausea     Other [Seasonal Allergies] Hives     Also allergic to sun.  Allergist told her she is allergic to the sun.     Penicillins Hives     Sulfa Antibiotics Hives     Sulfa (sulfonamide antibiotics)       Ziprasidone      FUMCM Dischg Summary. Patient unaware     Current Outpatient Medications   Medication     acetaminophen (TYLENOL) 325 MG tablet     albuterol (ACCUNEB) 0.63 MG/3ML neb solution     albuterol (PROAIR HFA/PROVENTIL HFA/VENTOLIN HFA) 108 (90 Base) MCG/ACT Inhaler     beclomethasone (QNASL) 80 MCG/ACT nasal aerosol     blood glucose (NO BRAND SPECIFIED) test strip     cetirizine (ZYRTEC) 10 MG tablet     cholecalciferol 5000 UNITS CAPS     ClonazePAM (KLONOPIN PO)     EPINEPHrine (ANY BX GENERIC EQUIV) 0.3 MG/0.3ML injection 2-pack     fluticasone (FLONASE) 50 MCG/ACT nasal spray     fluticasone (FLOVENT HFA) 110 MCG/ACT Inhaler     Garlic 10 MG CAPS     KRILL OIL PO     LANCETS MISC.     LEVOTHYROXINE SODIUM PO     loratadine (CLARITIN) 10 MG tablet     metFORMIN (GLUCOPHAGE) 500 MG tablet     montelukast (SINGULAIR) 10 MG tablet     Multiple Vitamin (MULTI VITAMIN DAILY PO)     Nitroglycerin (NITROSTAT SL)     ondansetron (ZOFRAN ODT) 4 MG ODT tab     order for DME     SUBLINGUAL IMMUNOTHERAPY, SLIT,     TRAZODONE HCL PO     Turmeric 500 MG CAPS     vitamin C (ASCORBIC ACID) 250 MG TABS tablet     Zinc 25 MG TABS     No current facility-administered medications for this visit.     ROS- SEE HPI  /72 (BP Location: Right arm, Patient Position: Sitting, Cuff Size: Adult Regular)   Pulse 66   Temp 98.6  F (37  C) (Tympanic)   Ht 1.727 m (5' 8\")   Wt 93.4 kg (206 lb)   SpO2 97%   BMI 31.32 kg/m      General - The " patient is well nourished and well developed, and appears to have good nutritional status.  Alert and oriented to person and place, answers questions and cooperates with examination appropriately.   Head and Face - Normocephalic and atraumatic, with no gross asymmetry noted.  The facial nerve is intact, with strong symmetric movements.  Voice and Breathing - The patient was breathing comfortably without the use of accessory muscles. There was no wheezing, stridor, or stertor.  The patients voice was clear and strong, and had appropriate pitch and quality.  Ears -The external auditory canals are patent, the tympanic membranes are intact without effusion, retraction or mass.  Bony landmarks are intact.  Eyes - Extraocular movements intact, and the pupils were reactive to light.  Sclera were not icteric or injected, conjunctiva were pink and moist.  Mouth - Examination of the oral cavity showed pink, healthy oral mucosa. No lesions or ulcerations noted.  The tongue was mobile and midline, and the dentition were in good condition.    Throat - The walls of the oropharynx were smooth, pink, moist, symmetric, and had no lesions or ulcerations.  The tonsillar pillars and soft palate were symmetric.  The uvula was midline on elevation.  S[place]ruz Tongue Position III, low palate, large tongue   Neck - Normal midline excursion of the laryngotracheal complex during swallowing.  Full range of motion on passive movement.  Palpation of the occipital, submental, submandibular, internal jugular chain, and supraclavicular nodes did not demonstrate any abnormal lymph nodes or masses.  Palpation of the thyroid was soft and smooth, with no nodules or goiter appreciated.  The trachea was mobile and midline.  Nose - External contour is symmetric, no gross deflection or scars.  Nasal mucosa is pink and moist with no abnormal mucus.  The septum was intact, turbinates moderate hypertrophy and boggy nasal mucosa.  No polyps, masses, or  purulence noted on examination.  Heart-regular rate.  Lungs-clear anterior posterior no wheeze rales.           ASSESSMENT/ PLAN:    ICD-10-CM    1. Perennial allergic rhinitis  J30.89       2. Allergic conjunctivitis, bilateral  H10.13           Overall Mattie has been doing well.  She reports intermittent seasonal change especially this past spring-summer with pollens.  However she reports they are pretty managed with remedies that she has.  Continue with sublingual immunotherapy.  Complete new vial at 3 drops a day.  The next vial may go to twice daily.    Continue with Claritin daily, following seasonal change may use as needed.  Continue with Singulair.  Continue with Qnasl on daily basis during seasonal change again patient may discontinue and follow.    Return to ENT in 1 year.      Samantha Donato PA-C  ENT  [facility], [place]

## 2023-06-15 ENCOUNTER — OFFICE VISIT (OUTPATIENT)
Dept: OTOLARYNGOLOGY | Facility: OTHER | Age: 68
End: 2023-06-15
Attending: PHYSICIAN ASSISTANT
Payer: MEDICARE

## 2023-06-15 VITALS
DIASTOLIC BLOOD PRESSURE: 72 MMHG | HEART RATE: 66 BPM | TEMPERATURE: 98.6 F | WEIGHT: 206 LBS | SYSTOLIC BLOOD PRESSURE: 130 MMHG | BODY MASS INDEX: 31.22 KG/M2 | HEIGHT: 68 IN | OXYGEN SATURATION: 97 %

## 2023-06-15 DIAGNOSIS — J30.89 PERENNIAL ALLERGIC RHINITIS: Primary | ICD-10-CM

## 2023-06-15 DIAGNOSIS — H10.13 ALLERGIC CONJUNCTIVITIS, BILATERAL: ICD-10-CM

## 2023-06-15 PROCEDURE — G0463 HOSPITAL OUTPT CLINIC VISIT: HCPCS

## 2023-06-15 PROCEDURE — 99213 OFFICE O/P EST LOW 20 MIN: CPT | Performed by: PHYSICIAN ASSISTANT

## 2023-06-15 ASSESSMENT — PAIN SCALES - GENERAL: PAINLEVEL: NO PAIN (0)

## 2023-06-15 NOTE — PATIENT INSTRUCTIONS
Continue with allergy drops  Maintain three drops  for this vial. Following vial will be twice a day    Continue with Claritin, Singulair and Qnasl. May hold Claritin as needed.     Follow up in 1 year or as needed.    Thank you for allowing Samantha Donato PA-C and our ENT team to participate in your care.  If your medications are too expensive, please give the nurse a call.  We can possibly change this medication.  If you have a scheduling or an appointment question please contact our Health Unit Coordinator at [phone], [phone]. [phone].    ALL nursing questions or concerns can be directed to your ENT nurse at: [phone] Hailey

## 2023-06-15 NOTE — LETTER
6/15/2023         RE: Mattie Carranza  [address] [facility] [address]        Dear Colleague,    Thank you for referring your patient, Mattie Carranza, to the [facility]. Please see a copy of my visit note below.    Chief Complaint   Patient presents with     Allergies     Slit follow up   Patient was last seen on 6/14/2022 for routine sublingual immunotherapy follow-up.  At that time she was doing well she was recommended to continue with immunotherapy, Qnasl, Singulair.    Today, Mattie has intermittent allergy seasons flares.   She has been using daily Qnasl.   She has been taking Claritin, Singulair daily w/ results.   Her breathing overall has been well. Reports no recent flares or exacerbations.       Exacerbates in fall and around her daughter's dog.     Finished home, no mold or standing water.  FA heat.    Hx of obstructive sleep apnea on  CPAP.  Had tried several masks and finally was compliant with the full face mask  No weight loss.  Last sleep study 1.5 years ago.   Pulmonary function test was completed which had minimal obstructive pattern.         MQT- 5/21/21  Dilution #6- None  Dilution #5-thsitle, grass, oak, sherri, Alterna, penicillium, epiploicum, dust  Dilution #2- ragweed, pigweed, birch, pine, cottonwood, walnut, molds, cat, dog.      She was on sublingual immunotherapy starting in 2016 maintenance 20 16-20 20 and then we repeated the M QT on 5/21/2021 and restarted new dose per M QT results.    Past Medical History:   Diagnosis Date     Adenomatous colon polyp 10/17/2011     ADHD (attention deficit hyperactivity disorder) 10/17/2011     Atypical chest pain 8/3/2018     Degenerative disk disease 10/17/2011     Diverticulosis 10/17/2011     Dizziness 09/03/2013    BPV     Epistaxis 9/3/2013     Head trauma 7/13/2012     Psychosis (H) 2/3/2016     Seizures 07/13/2012        Allergies   Allergen Reactions     Aspirin Hives     Buprenorphine      Bupropion Nausea      pain     Codeine Nausea     Diazepam Other (See Comments)     Valium  Increased anxiety     Diclofenac-Misoprostol      GI upset     Dye [Contrast Dye] Hives     IV dye, iodine containing contrast media     Fentanyl       (When mixed with Versed) Agitated and combative.     Iodinated Contrast Media Hives     Iodine      Latex Hives     From gloves and elastic     Meperidine Nausea     FUMCM Dischg Summary. If given alone     Midazolam      FUMCM Dischg Summary. Patient unaware     Midazolam Hcl        anxiety     Morphine Nausea     Other [Seasonal Allergies] Hives     Also allergic to sun.  Allergist told her she is allergic to the sun.     Penicillins Hives     Sulfa Antibiotics Hives     Sulfa (sulfonamide antibiotics)       Ziprasidone      FUMCM Dischg Summary. Patient unaware     Current Outpatient Medications   Medication     acetaminophen (TYLENOL) 325 MG tablet     albuterol (ACCUNEB) 0.63 MG/3ML neb solution     albuterol (PROAIR HFA/PROVENTIL HFA/VENTOLIN HFA) 108 (90 Base) MCG/ACT Inhaler     beclomethasone (QNASL) 80 MCG/ACT nasal aerosol     blood glucose (NO BRAND SPECIFIED) test strip     cetirizine (ZYRTEC) 10 MG tablet     cholecalciferol 5000 UNITS CAPS     ClonazePAM (KLONOPIN PO)     EPINEPHrine (ANY BX GENERIC EQUIV) 0.3 MG/0.3ML injection 2-pack     fluticasone (FLONASE) 50 MCG/ACT nasal spray     fluticasone (FLOVENT HFA) 110 MCG/ACT Inhaler     Garlic 10 MG CAPS     KRILL OIL PO     LANCETS MISC.     LEVOTHYROXINE SODIUM PO     loratadine (CLARITIN) 10 MG tablet     metFORMIN (GLUCOPHAGE) 500 MG tablet     montelukast (SINGULAIR) 10 MG tablet     Multiple Vitamin (MULTI VITAMIN DAILY PO)     Nitroglycerin (NITROSTAT SL)     ondansetron (ZOFRAN ODT) 4 MG ODT tab     order for DME     SUBLINGUAL IMMUNOTHERAPY, SLIT,     TRAZODONE HCL PO     Turmeric 500 MG CAPS     vitamin C (ASCORBIC ACID) 250 MG TABS tablet     Zinc 25 MG TABS     No current facility-administered medications for this  "visit.     ROS- SEE HPI  /72 (BP Location: Right arm, Patient Position: Sitting, Cuff Size: Adult Regular)   Pulse 66   Temp 98.6  F (37  C) (Tympanic)   Ht 1.727 m (5' 8\")   Wt 93.4 kg (206 lb)   SpO2 97%   BMI 31.32 kg/m      General - The patient is well nourished and well developed, and appears to have good nutritional status.  Alert and oriented to person and place, answers questions and cooperates with examination appropriately.   Head and Face - Normocephalic and atraumatic, with no gross asymmetry noted.  The facial nerve is intact, with strong symmetric movements.  Voice and Breathing - The patient was breathing comfortably without the use of accessory muscles. There was no wheezing, stridor, or stertor.  The patients voice was clear and strong, and had appropriate pitch and quality.  Ears -The external auditory canals are patent, the tympanic membranes are intact without effusion, retraction or mass.  Bony landmarks are intact.  Eyes - Extraocular movements intact, and the pupils were reactive to light.  Sclera were not icteric or injected, conjunctiva were pink and moist.  Mouth - Examination of the oral cavity showed pink, healthy oral mucosa. No lesions or ulcerations noted.  The tongue was mobile and midline, and the dentition were in good condition.    Throat - The walls of the oropharynx were smooth, pink, moist, symmetric, and had no lesions or ulcerations.  The tonsillar pillars and soft palate were symmetric.  The uvula was midline on elevation.  Santacruz Tongue Position III, low palate, large tongue   Neck - Normal midline excursion of the laryngotracheal complex during swallowing.  Full range of motion on passive movement.  Palpation of the occipital, submental, submandibular, internal jugular chain, and supraclavicular nodes did not demonstrate any abnormal lymph nodes or masses.  Palpation of the thyroid was soft and smooth, with no nodules or goiter appreciated.  The trachea was " mobile and midline.  Nose - External contour is symmetric, no gross deflection or scars.  Nasal mucosa is pink and moist with no abnormal mucus.  The septum was intact, turbinates moderate hypertrophy and boggy nasal mucosa.  No polyps, masses, or purulence noted on examination.  Heart-regular rate.  Lungs-clear anterior posterior no wheeze rales.           ASSESSMENT/ PLAN:    ICD-10-CM    1. Perennial allergic rhinitis  J30.89       2. Allergic conjunctivitis, bilateral  H10.13           Overall Mattie has been doing well.  She reports intermittent seasonal change especially this past spring-summer with pollens.  However she reports they are pretty managed with remedies that she has.  Continue with sublingual immunotherapy.  Complete new vial at 3 drops a day.  The next vial may go to twice daily.    Continue with Claritin daily, following seasonal change may use as needed.  Continue with Singulair.  Continue with Qnasl on daily basis during seasonal change again patient may discontinue and follow.    Return to ENT in 1 year.      Samantha Donato PA-C  ENT  [facility], [place]          Again, thank you for allowing me to participate in the care of your patient.        Sincerely,        Samantha Donato PA-C

## 2023-06-21 DIAGNOSIS — J30.89 PERENNIAL ALLERGIC RHINITIS: ICD-10-CM

## 2023-06-21 DIAGNOSIS — H10.13 ALLERGIC CONJUNCTIVITIS, BILATERAL: ICD-10-CM

## 2023-06-21 RX ORDER — MONTELUKAST SODIUM 10 MG/1
TABLET ORAL
Qty: 90 TABLET | Refills: 3 | Status: SHIPPED | OUTPATIENT
Start: 2023-06-21 | End: 2024-06-05

## 2023-06-21 RX ORDER — FLUTICASONE PROPIONATE 50 MCG
2 SPRAY, SUSPENSION (ML) NASAL DAILY
Qty: 48 G | Refills: 3 | Status: SHIPPED | OUTPATIENT
Start: 2023-06-21

## 2023-06-21 NOTE — TELEPHONE ENCOUNTER
Flonase  Last Written Prescription Date: 7/7/21  Last Fill Quantity: 48 g # of Refills: 3  Last Office Visit: 6/15/23    Singulair  Last Written Prescription Date: 6/14/22  Last Fill Quantity: 90 # of Refills: 30  Last Office Visit: 6/15/23

## 2023-06-23 ENCOUNTER — TELEPHONE (OUTPATIENT)
Dept: FAMILY MEDICINE | Facility: OTHER | Age: 68
End: 2023-06-23

## 2023-06-23 NOTE — DISCHARGE INSTRUCTIONS
-- DO NOT REPLY / DO NOT REPLY ALL --  -- Message is from Engagement Center Operations (ECO) --    General Patient Message: Patient mom called in, states patient goes to  [facility] in [place] for health care also and is requesting a updated referral be faxed to [employer] Information management [phone]  Mom states it has been a while since one has been faxed to them.     Caller Information       Type Contact Phone/Fax    06/23/2023 11:17 AM CDT Phone (Incoming) THADDEUSSOLITARIO SCHULER (Mother) [phone]        Alternative phone number:     Can a detailed message be left? Yes    Message Turnaround: [place]:    Refer to site's KB page for routing instructions    Please give this turnaround time to the caller:   \"You can expect to receive a response 2-3 business days after your provider's clinical team reviews the message\"               Take Acyclovir as ordered.   You can try Capsaicin cream to rash.   Take tylenol and or ibuprofen for discomfort.   Follow up with PCP with any increase in symptoms or concerns.   Return to urgent care or emergency department with any increase in symptoms or concerns.

## 2023-06-23 NOTE — TELEPHONE ENCOUNTER
Patient has been waiting for the sublingual drops to be sent to [facility].  She is angry and need's this sent in today so she doesn't have to start over from the beginning.    Please call her back to discuss    From 6/15/23 notes  MQT- 5/21/21  Dilution #6- None  Dilution #5-thsitle, grass, oak, sherri, Alterna, penicillium, epiploicum, dust  Dilution #2- ragweed, pigweed, birch, pine, cottonwood, walnut, molds, cat, dog.      She was on sublingual immunotherapy starting in 2016 maintenance 20 16-20 20 and then we repeated the M QT on 5/21/2021 and restarted new dose per M QT resu      Overall Mattie has been doing well.  She reports intermittent seasonal change especially this past spring-summer with pollens.  However she reports they are pretty managed with remedies that she has.  Continue with sublingual immunotherapy.  Complete new vial at 3 drops a day.  The next vial may go to twice daily

## 2023-06-27 ENCOUNTER — TELEPHONE (OUTPATIENT)
Dept: OTOLARYNGOLOGY | Facility: OTHER | Age: 68
End: 2023-06-27

## 2023-06-27 DIAGNOSIS — J45.20 MILD INTERMITTENT REACTIVE AIRWAY DISEASE WITHOUT COMPLICATION: ICD-10-CM

## 2023-06-27 RX ORDER — ALBUTEROL SULFATE 90 UG/1
2 AEROSOL, METERED RESPIRATORY (INHALATION) EVERY 6 HOURS PRN
Qty: 18 G | Refills: 4 | Status: SHIPPED | OUTPATIENT
Start: 2023-06-27

## 2023-06-27 NOTE — TELEPHONE ENCOUNTER
Call is returned to Mattie after she was inquiring about her allergy drops and a refill for her rescue inhaler.  Allergy drops per scription was faxed to [facility] at [phone].  Discussed the rescue inhaler with Samantha Donato NP and she will refill her ProAir.  Mattie would like this called to [facility] mail perscriptions.

## 2023-10-20 NOTE — PROGRESS NOTES
Care Transitions Progress Note:    Reason for Follow up:  Discharge Planning.  Care Transitions unable to locate a home care agency to care for patient in [place], [place].  [facility] [phone] will follow up with Care Transitions on 8/6/18.  Patient would like to go to TCU.  Discussed the guidelines for the TCU Medicare benefit and the need to self pay if Medicare guidelines are not met.  The patient is unable to private pay.  Per Dr. Padilla, he is discussing with Utilization Review changing patient to Inpatient Status retroactive so she will qualify for TCU.  Patient was provided with Medicare certified nursing home list. Pts choices are as follows [facility], [place], [place] P: [phone]/F: [phone] or [facility], [place], [place] P: [phone]/F: [phone].  Referrals are pending.  If unable to qualify for the TCU benefit the patient will return home.    Discharge Plan:  TBD    Discharge Planner   Discharge Plans in progress: Home vs Home with [facility] vs TCU  Barriers to discharge plan: Disposition  Follow up plan: Follow up with podiatry       Entered by: Tory Mckeon 08/04/2018 1:22 PM         Tory Mckeon RN, Care Coordinator  [phone]   Awake/Alert

## 2023-11-15 ENCOUNTER — TELEPHONE (OUTPATIENT)
Dept: ALLERGY | Facility: OTHER | Age: 68
End: 2023-11-15

## 2023-11-15 DIAGNOSIS — J30.89 PERENNIAL ALLERGIC RHINITIS: Primary | ICD-10-CM

## 2023-11-15 NOTE — TELEPHONE ENCOUNTER
"1145: called and spoke to the patient regarding her request for her allergy drops to be refilled was received, signed and [facility] to [facility] ([phone]) at 1143. Asked the patient how she was doing on the allergy drops and the patient states, \"doing good, maintaining, and notice the changes with the seasons and doing well right now.\" Patient asked if needing an Epipen refilled at this time, declined and not needed. Patient also inquired about making her ENT follow up appointment and was transferred to the schedulers to make the appointment if able. No further questions or concerns at call completion, and informed to let us know if needing anything further.   "

## 2023-11-28 ENCOUNTER — IMMUNIZATION (OUTPATIENT)
Dept: FAMILY MEDICINE | Facility: OTHER | Age: 68
End: 2023-11-28
Attending: FAMILY MEDICINE
Payer: MEDICARE

## 2023-11-28 DIAGNOSIS — Z23 HIGH PRIORITY FOR 2019-NCOV VACCINE: Primary | ICD-10-CM

## 2023-11-28 PROCEDURE — 91320 SARSCV2 VAC 30MCG TRS-SUC IM: CPT

## 2024-02-16 ENCOUNTER — TRANSFERRED RECORDS (OUTPATIENT)
Dept: HEALTH INFORMATION MANAGEMENT | Facility: HOSPITAL | Age: 69
End: 2024-02-16

## 2024-04-26 ENCOUNTER — TELEPHONE (OUTPATIENT)
Dept: ALLERGY | Facility: OTHER | Age: 69
End: 2024-04-26

## 2024-04-26 NOTE — TELEPHONE ENCOUNTER
Called and spoke to patient. Verified last name and . Informed patient that her allergy refill has been faxed to [facility]. Patient states she is still doing well on her allergy drops and has no concerns at this time. Patient states she is away form her Epipen at the moment but will check to make sure it is still current when she gets home and let the allergy department know if she needs a new prescription. Patient had no questions or concerns at call completion.

## 2024-06-04 NOTE — PROGRESS NOTES
Chief Complaint   Patient presents with    Follow Up     Allergy follow up       Patient was last seen on 6/15/23 for routine sublingual immunotherapy follow-up.  At that time she was doing well she was recommended to continue with immunotherapy, Qnasl, Singulair.     Today, Mattie has been doing fairly well. Concerned about possible CPAP contributing to her dental concerns.   Last sleep study was about 8 years ago. She had lost 78 pounds over the last few years. She had several dental caries over the year. DDS noted concerns for CPAP.    She has concern for significant nasal sores, dryness.  She has biotene for oral dryness and an additional spray.     She has bilateral nasal sores. She has used aquaphor PRN.   She has been using Qnasl as needed   She has been on Singulair.   Reports her allergies are doing fairly well.   Her SLIT     She has been using daily Qnasl.   She has been taking Claritin, Singulair daily w/ results.   Her breathing overall has been well. Reports no recent flares or exacerbations.      Exacerbates in fall and around her daughter's dog.     Finished home, no mold or standing water.  FA heat.    Hx of obstructive sleep apnea on  CPAP.  Had tried several masks and finally was compliant with the full face mask  Pulmonary function test was completed which had minimal obstructive pattern.         MQT- 5/21/21  Dilution #6- None  Dilution #5-thsitle, grass, oak, sherri, Alterna, penicillium, epiploicum, dust  Dilution #2- ragweed, pigweed, birch, pine, cottonwood, walnut, molds, cat, dog.      She was on sublingual immunotherapy starting in 2016 maintenance 20 16-20 20 and then we repeated the M QT on 5/21/2021 and restarted new dose per M QT results.            Past Medical History:   Diagnosis Date    Adenomatous colon polyp 10/17/2011    ADHD (attention deficit hyperactivity disorder) 10/17/2011    Atypical chest pain 8/3/2018    Degenerative disk disease 10/17/2011    Diverticulosis 10/17/2011     Dizziness 09/03/2013    BPV    Epistaxis 9/3/2013    Head trauma 7/13/2012    Psychosis (H) 2/3/2016    Seizures 07/13/2012       Current Outpatient Medications   Medication Sig Dispense Refill    acetaminophen (TYLENOL) 325 MG tablet Take 2 tablets (650 mg) by mouth every 4 hours as needed for mild pain 100 tablet 0    albuterol (ACCUNEB) 0.63 MG/3ML neb solution Take 3 mLs (0.63 mg) by nebulization every 6 hours as needed for shortness of breath / dyspnea or wheezing 270 mL 1    albuterol (PROAIR HFA/PROVENTIL HFA/VENTOLIN HFA) 108 (90 Base) MCG/ACT inhaler Inhale 2 puffs into the lungs every 6 hours as needed for shortness of breath 18 g 4    beclomethasone (QNASL) 80 MCG/ACT nasal aerosol Spray 2 sprays into both nostrils daily 30 g 4    blood glucose (NO BRAND SPECIFIED) test strip Text 1 time by finger poke route every day      cetirizine (ZYRTEC) 10 MG tablet Take 1 tablet (10 mg) by mouth every evening 30 tablet 1    cholecalciferol 5000 UNITS CAPS Take 1 capsule (5,000 Units) by mouth daily (Patient taking differently: Take 2,000 Units by mouth daily Taking 2,00 units not 5,000 units)      ClonazePAM (KLONOPIN PO) Take 0.5 mg by mouth At Bedtime      EPINEPHrine (ANY BX GENERIC EQUIV) 0.3 MG/0.3ML injection 2-pack Inject 0.3 mLs (0.3 mg) into the muscle once as needed for anaphylaxis 2 each 1    fluticasone (FLONASE) 50 MCG/ACT nasal spray Spray 2 sprays into both nostrils daily 48 g 3    fluticasone (FLOVENT HFA) 110 MCG/ACT Inhaler Inhale 2 puffs into the lungs 2 times daily      Garlic 10 MG CAPS Take 1 capsule by mouth daily Unsure the specific strength of capsule      KRILL OIL PO Take 250 mg by mouth daily      LANCETS MISC. Test 1 time daily      LEVOTHYROXINE SODIUM PO Take 50 mcg by mouth daily       loratadine (CLARITIN) 10 MG tablet Take 1 tablet (10 mg) by mouth daily 90 tablet 3    metFORMIN (GLUCOPHAGE) 500 MG tablet Take 500 mg by mouth daily      montelukast (SINGULAIR) 10 MG tablet TAKE ONE  "TABLET BY MOUTH EVERY DAY AT BEDTIME 90 tablet 3    Multiple Vitamin (MULTI VITAMIN DAILY PO)       Nitroglycerin (NITROSTAT SL) Place 0.4 mg under the tongue every 5 minutes as needed for chest pain      ondansetron (ZOFRAN ODT) 4 MG ODT tab Take 1 tablet (4 mg) by mouth every 8 hours as needed for nausea 10 tablet 0    order for DME autoPAP 15-18 cmH20 1 Device 0    semaglutide (OZEMPIC) 2 MG/3ML pen Inject 0.5 mg Subcutaneous every 7 days      SUBLINGUAL IMMUNOTHERAPY, SLIT, Place 1 drop under the tongue 3 times daily 1 vial PRN    TRAZODONE HCL PO Take 100 mg by mouth At Bedtime      Turmeric 500 MG CAPS Take 1 capsule by mouth daily      vitamin C (ASCORBIC ACID) 250 MG TABS tablet Take 500 mg by mouth daily      Zinc 25 MG TABS Take 1 tablet by mouth daily       No current facility-administered medications for this visit.     ROS- SEE HPI  /70 (BP Location: Left arm, Patient Position: Sitting, Cuff Size: Adult Large)   Pulse 72   Temp 97  F (36.1  C) (Tympanic)   Resp 18   Ht 1.727 m (5' 7.99\")   Wt 93.4 kg (205 lb 14.6 oz)   SpO2 97%   BMI 31.32 kg/m      General - The patient is well nourished and well developed, and appears to have good nutritional status.  Alert and oriented to person and place, answers questions and cooperates with examination appropriately.   Head and Face - Normocephalic and atraumatic, with no gross asymmetry noted.  The facial nerve is intact, with strong symmetric movements.  Voice and Breathing - The patient was breathing comfortably without the use of accessory muscles. There was no wheezing, stridor, or stertor.  The patients voice was clear and strong, and had appropriate pitch and quality.  Ears -The external auditory canals are patent, the tympanic membranes are intact without effusion, retraction or mass.  Bony landmarks are intact.  Eyes - Extraocular movements intact, and the pupils were reactive to light.  Sclera were not icteric or injected, conjunctiva were " pink and moist.  Mouth - Examination of the oral cavity showed pink, healthy oral mucosa. No lesions or ulcerations noted.  The tongue was mobile and midline, and the dentition were in good condition.    Throat - The walls of the oropharynx were smooth, pink, moist, symmetric, and had no lesions or ulcerations.  The tonsillar pillars and soft palate were symmetric.  The uvula was midline on elevation.  Santacruz Tongue Position III, low palate, large tongue   Neck - Normal midline excursion of the laryngotracheal complex during swallowing.  Full range of motion on passive movement.  Palpation of the occipital, submental, submandibular, internal jugular chain, and supraclavicular nodes did not demonstrate any abnormal lymph nodes or masses.  Palpation of the thyroid was soft and smooth, with no nodules or goiter appreciated.  The trachea was mobile and midline.  Nose - External contour is symmetric, no gross deflection or scars.  Nasal mucosa is pink and moist with no abnormal mucus.  The septum was intact, turbinates moderate hypertrophy and boggy nasal mucosa.  No polyps, masses, or purulence noted on examination.  Heart-regular rate.  Lungs-clear anterior posterior no wheeze rales.           ASSESSMENT/ PLAN:    ICD-10-CM    1. Perennial allergic rhinitis  J30.89 montelukast (SINGULAIR) 10 MG tablet      2. JOSE on CPAP  G47.33 Adult Sleep Eval & Management  Referral      3. Nasal dryness  J34.89           Continue with sublingual immunotherapy.  Complete this current vial of twice daily dosing.  Next vial will be 1 drop daily.    Once she has completed that additional vial she has completed a course.    Continue with Singulair.  And Qnasl.    Referral for sleep study was completed.  She wishes to possibly repeat sleep study and or speak to sleep medicine with regards to discussed as above from her dentist.    Recommended use of nasal Ayr gel for nasal dryness.        Samantha Donato PA-C  ENT  [facility],  [place]

## 2024-06-05 ENCOUNTER — OFFICE VISIT (OUTPATIENT)
Dept: OTOLARYNGOLOGY | Facility: OTHER | Age: 69
End: 2024-06-05
Attending: PHYSICIAN ASSISTANT
Payer: MEDICARE

## 2024-06-05 VITALS
HEIGHT: 68 IN | TEMPERATURE: 97 F | OXYGEN SATURATION: 97 % | HEART RATE: 72 BPM | WEIGHT: 205.91 LBS | RESPIRATION RATE: 18 BRPM | DIASTOLIC BLOOD PRESSURE: 70 MMHG | SYSTOLIC BLOOD PRESSURE: 109 MMHG | BODY MASS INDEX: 31.21 KG/M2

## 2024-06-05 DIAGNOSIS — J34.89 NASAL DRYNESS: ICD-10-CM

## 2024-06-05 DIAGNOSIS — J30.89 PERENNIAL ALLERGIC RHINITIS: Primary | ICD-10-CM

## 2024-06-05 DIAGNOSIS — G47.33 OSA ON CPAP: ICD-10-CM

## 2024-06-05 PROCEDURE — G0463 HOSPITAL OUTPT CLINIC VISIT: HCPCS

## 2024-06-05 PROCEDURE — 99213 OFFICE O/P EST LOW 20 MIN: CPT | Performed by: PHYSICIAN ASSISTANT

## 2024-06-05 RX ORDER — AZITHROMYCIN 250 MG/1
TABLET, FILM COATED ORAL
COMMUNITY
Start: 2024-01-29

## 2024-06-05 RX ORDER — MONTELUKAST SODIUM 10 MG/1
1 TABLET ORAL AT BEDTIME
Qty: 90 TABLET | Refills: 3 | Status: SHIPPED | OUTPATIENT
Start: 2024-06-05 | End: 2024-08-30

## 2024-06-05 RX ORDER — TIRZEPATIDE 10 MG/.5ML
INJECTION, SOLUTION SUBCUTANEOUS
COMMUNITY
Start: 2024-05-23

## 2024-06-05 RX ORDER — TIRZEPATIDE 2.5 MG/.5ML
INJECTION, SOLUTION SUBCUTANEOUS
COMMUNITY
Start: 2023-11-22

## 2024-06-05 RX ORDER — PREDNISONE 5 MG/1
TABLET ORAL
COMMUNITY
Start: 2024-01-31

## 2024-06-05 ASSESSMENT — PAIN SCALES - GENERAL: PAINLEVEL: NO PAIN (0)

## 2024-06-05 NOTE — PATIENT INSTRUCTIONS
Referral for sleep study.   Obtain Nasal Ayr gel- apply to both nostrils 2-3 times daily (best to use at bedtime)  Complete current vial at two drops a day  Next vial reduce to one drop daily.     Continue with Singulair.   Continue with Qnasl.       Thank you for allowing Samantha Donato PA-C and our ENT team to participate in your care.  If your medications are too expensive, please give the nurse a call.  We can possibly change this medication.  If you have a scheduling or an appointment question please contact our Health Unit Coordinator at [phone], Ext. [phone].    ALL nursing questions or concerns can be directed to your ENT nurse at: [phone] [place]

## 2024-06-05 NOTE — LETTER
6/5/2024      Mattie Carranza  [address] [facility] [id number]      Dear Colleague,    Thank you for referring your patient, Mattie Carranza, to the [facility]. Please see a copy of my visit note below.    Chief Complaint   Patient presents with     Follow Up     Allergy follow up       Patient was last seen on 6/15/23 for routine sublingual immunotherapy follow-up.  At that time she was doing well she was recommended to continue with immunotherapy, Qnasl, Singulair.     Today, Mattie has been doing fairly well. Concerned about possible CPAP contributing to her dental concerns.   Last sleep study was about 8 years ago. She had lost 78 pounds over the last few years. She had several dental caries over the year. DDS noted concerns for CPAP.    She has concern for significant nasal sores, dryness.  She has biotene for oral dryness and an additional spray.     She has bilateral nasal sores. She has used aquaphor PRN.   She has been using Qnasl as needed   She has been on Singulair.   Reports her allergies are doing fairly well.   Her SLIT     She has been using daily Qnasl.   She has been taking Claritin, Singulair daily w/ results.   Her breathing overall has been well. Reports no recent flares or exacerbations.      Exacerbates in fall and around her daughter's dog.     Finished home, no mold or standing water.  FA heat.    Hx of obstructive sleep apnea on  CPAP.  Had tried several masks and finally was compliant with the full face mask  Pulmonary function test was completed which had minimal obstructive pattern.         MQT- 5/21/21  Dilution #6- None  Dilution #5-thsitle, grass, oak, sherri, Alterna, penicillium, epiploicum, dust  Dilution #2- ragweed, pigweed, birch, pine, cottonwood, walnut, molds, cat, dog.      She was on sublingual immunotherapy starting in 2016 maintenance 20 16-20 20 and then we repeated the M QT on 5/21/2021 and restarted new dose per M QT results.            Past  Medical History:   Diagnosis Date     Adenomatous colon polyp 10/17/2011     ADHD (attention deficit hyperactivity disorder) 10/17/2011     Atypical chest pain 8/3/2018     Degenerative disk disease 10/17/2011     Diverticulosis 10/17/2011     Dizziness 09/03/2013    BPV     Epistaxis 9/3/2013     Head trauma 7/13/2012     Psychosis (H) 2/3/2016     Seizures 07/13/2012       Current Outpatient Medications   Medication Sig Dispense Refill     acetaminophen (TYLENOL) 325 MG tablet Take 2 tablets (650 mg) by mouth every 4 hours as needed for mild pain 100 tablet 0     albuterol (ACCUNEB) 0.63 MG/3ML neb solution Take 3 mLs (0.63 mg) by nebulization every 6 hours as needed for shortness of breath / dyspnea or wheezing 270 mL 1     albuterol (PROAIR HFA/PROVENTIL HFA/VENTOLIN HFA) 108 (90 Base) MCG/ACT inhaler Inhale 2 puffs into the lungs every 6 hours as needed for shortness of breath 18 g 4     beclomethasone (QNASL) 80 MCG/ACT nasal aerosol Spray 2 sprays into both nostrils daily 30 g 4     blood glucose (NO BRAND SPECIFIED) test strip Text 1 time by finger poke route every day       cetirizine (ZYRTEC) 10 MG tablet Take 1 tablet (10 mg) by mouth every evening 30 tablet 1     cholecalciferol 5000 UNITS CAPS Take 1 capsule (5,000 Units) by mouth daily (Patient taking differently: Take 2,000 Units by mouth daily Taking 2,00 units not 5,000 units)       ClonazePAM (KLONOPIN PO) Take 0.5 mg by mouth At Bedtime       EPINEPHrine (ANY BX GENERIC EQUIV) 0.3 MG/0.3ML injection 2-pack Inject 0.3 mLs (0.3 mg) into the muscle once as needed for anaphylaxis 2 each 1     fluticasone (FLONASE) 50 MCG/ACT nasal spray Spray 2 sprays into both nostrils daily 48 g 3     fluticasone (FLOVENT HFA) 110 MCG/ACT Inhaler Inhale 2 puffs into the lungs 2 times daily       Garlic 10 MG CAPS Take 1 capsule by mouth daily Unsure the specific strength of capsule       KRILL OIL PO Take 250 mg by mouth daily       LANCETS MISC. Test 1 time daily    "    LEVOTHYROXINE SODIUM PO Take 50 mcg by mouth daily        loratadine (CLARITIN) 10 MG tablet Take 1 tablet (10 mg) by mouth daily 90 tablet 3     metFORMIN (GLUCOPHAGE) 500 MG tablet Take 500 mg by mouth daily       montelukast (SINGULAIR) 10 MG tablet TAKE ONE TABLET BY MOUTH EVERY DAY AT BEDTIME 90 tablet 3     Multiple Vitamin (MULTI VITAMIN DAILY PO)        Nitroglycerin (NITROSTAT SL) Place 0.4 mg under the tongue every 5 minutes as needed for chest pain       ondansetron (ZOFRAN ODT) 4 MG ODT tab Take 1 tablet (4 mg) by mouth every 8 hours as needed for nausea 10 tablet 0     order for DME autoPAP 15-18 cmH20 1 Device 0     semaglutide (OZEMPIC) 2 MG/3ML pen Inject 0.5 mg Subcutaneous every 7 days       SUBLINGUAL IMMUNOTHERAPY, SLIT, Place 1 drop under the tongue 3 times daily 1 vial PRN     TRAZODONE HCL PO Take 100 mg by mouth At Bedtime       Turmeric 500 MG CAPS Take 1 capsule by mouth daily       vitamin C (ASCORBIC ACID) 250 MG TABS tablet Take 500 mg by mouth daily       Zinc 25 MG TABS Take 1 tablet by mouth daily       No current facility-administered medications for this visit.     ROS- SEE HPI  /70 (BP Location: Left arm, Patient Position: Sitting, Cuff Size: Adult Large)   Pulse 72   Temp 97  F (36.1  C) (Tympanic)   Resp 18   Ht 1.727 m (5' 7.99\")   Wt 93.4 kg (205 lb 14.6 oz)   SpO2 97%   BMI 31.32 kg/m      General - The patient is well nourished and well developed, and appears to have good nutritional status.  Alert and oriented to person and place, answers questions and cooperates with examination appropriately.   Head and Face - Normocephalic and atraumatic, with no gross asymmetry noted.  The facial nerve is intact, with strong symmetric movements.  Voice and Breathing - The patient was breathing comfortably without the use of accessory muscles. There was no wheezing, stridor, or stertor.  The patients voice was clear and strong, and had appropriate pitch and quality.  Ears " -The external auditory canals are patent, the tympanic membranes are intact without effusion, retraction or mass.  Bony landmarks are intact.  Eyes - Extraocular movements intact, and the pupils were reactive to light.  Sclera were not icteric or injected, conjunctiva were pink and moist.  Mouth - Examination of the oral cavity showed pink, healthy oral mucosa. No lesions or ulcerations noted.  The tongue was mobile and midline, and the dentition were in good condition.    Throat - The walls of the oropharynx were smooth, pink, moist, symmetric, and had no lesions or ulcerations.  The tonsillar pillars and soft palate were symmetric.  The uvula was midline on elevation.  [place] Tongue Position III, low palate, large tongue   Neck - Normal midline excursion of the laryngotracheal complex during swallowing.  Full range of motion on passive movement.  Palpation of the occipital, submental, submandibular, internal jugular chain, and supraclavicular nodes did not demonstrate any abnormal lymph nodes or masses.  Palpation of the thyroid was soft and smooth, with no nodules or goiter appreciated.  The trachea was mobile and midline.  Nose - External contour is symmetric, no gross deflection or scars.  Nasal mucosa is pink and moist with no abnormal mucus.  The septum was intact, turbinates moderate hypertrophy and boggy nasal mucosa.  No polyps, masses, or purulence noted on examination.  Heart-regular rate.  Lungs-clear anterior posterior no wheeze rales.           ASSESSMENT/ PLAN:    ICD-10-CM    1. Perennial allergic rhinitis  J30.89 montelukast (SINGULAIR) 10 MG tablet      2. JOSE on CPAP  G47.33 Adult Sleep Eval & Management  Referral      3. Nasal dryness  J34.89           Continue with sublingual immunotherapy.  Complete this current vial of twice daily dosing.  Next vial will be 1 drop daily.    Once she has completed that additional vial she has completed a course.    Continue with Singulair.  And  Qnasl.    Referral for sleep study was completed.  She wishes to possibly repeat sleep study and or speak to sleep medicine with regards to discussed as above from her dentist.    Recommended use of nasal Ayr gel for nasal dryness.        Samantha Donato PA-C  ENT  [facility], [place]      Again, thank you for allowing me to participate in the care of your patient.        Sincerely,        Samantha Donato PA-C

## 2024-06-10 ENCOUNTER — TELEPHONE (OUTPATIENT)
Dept: OTOLARYNGOLOGY | Facility: OTHER | Age: 69
End: 2024-06-10

## 2024-06-10 NOTE — TELEPHONE ENCOUNTER
Mattie needs her prescription sent to [facility] by mail, NOT [facility]. Thank you

## 2024-06-17 ENCOUNTER — DOCUMENTATION ONLY (OUTPATIENT)
Dept: SLEEP MEDICINE | Facility: HOSPITAL | Age: 69
End: 2024-06-17

## 2024-06-17 DIAGNOSIS — G47.33 OSA (OBSTRUCTIVE SLEEP APNEA): Chronic | ICD-10-CM

## 2024-06-17 DIAGNOSIS — E66.01 MORBID OBESITY (H): Primary | Chronic | ICD-10-CM

## 2024-06-18 NOTE — PROGRESS NOTES
SLEEP HISTORY QUESTIONNAIRE    Please describe the main reason for your sleep appointment? Referral from pradeep izquierdo, severe dry moth causing 7 cavities past year, nasal sores that won't heal    How long has this been a problem? Couple years    Have you been diagnosed with a sleep problem in the past? YES    If so, what? Sleep apnea    What treatment was recommended? cpap    Have you had a sleep study in the past? YES    If yes, where and when? [facility]    Sleep Habits:   Do you read in bed? No  Do you eat in bed? No  Do you watch TV in bed? No  Do you work in bed? No  Do you use a phone or computer in bed? No    Is you sleep disturbed by:   Bed partner: No  Children: Yes  Noise: Yes   Pets:   Other:       On two or more nights per week, do you drink alcohol to help you fall asleep?NO    On two or more nights per week, do you take melatonin to help you fall asleep? NO    On two or more nights per week, do you take over the counter medicine to fall asleep?  NO    Do you take drinks with caffeine (coffee, tea, soda, energy drinks)? YES    Do you have 3 or more caffeine drinks in a day? NO    Do you have caffeine drinks within 6 hours of bedtime? NO    Do you smoke or use tobacco? YES    Do you exercise? YES    Sleep Routine:   Using a 24 Hour Clock    What time do you usually get into bed on workdays?     Weekend/non work days?     What time do you get out of bed on workdays?       Weekend/non work days?    Do you work the evening or night shift or do your shifts rotate?     How long does it usually take to fall to sleep? 5 min    How many times do you wake during the night? 1    How much time do you feel that you are awake during the entire night? 5 min    How long does it take for you to fall back to sleep after you wake up? 5 min    Why do you think you wake up? urinate    What do you do when you wake up? urinate    How much sleep do you think you get on work nights?     How much sleep do you think you get  on weekends/non work days?     How much sleep do you think you need to feel your best?     How many days during a week do you take a nap on average? 1    What is the average length of your naps? 1-2 hours    Do you feel better after taking a nap? YES    If you could chose the best sleep schedule for you, what time would you go to bed? 1030pm  What time would you get up? 6-7am    Do you read in bed? NO    Do you eat in bed? NO    Do you watch TV in bed? NO    Do you do work in bed? NO    Do you use a computer or phone in bed? NO    Sleep Disruptions?   Leg movements:  Do you ever have restless, crawling, aching or other unusual feelings in your legs? YES    Do you ever wake yourself by kicking your legs during the night? NO    Are the sheets and blankets messed up or tossed about when you get up? NO    Night-time behaviors:   Do you have nightmares or night terrors? NO   How often?     Have you had times when you were sleep walking? NO    Have you been seen doing anything unusual while you sleep at nights? NO  What?   How often?     Have you ever hurt yourself or someone else while you were sleeping? NO  Please describe:     Do you clench or grind your teeth during the night? no    Sleep Apnea (pauses in breathing during sleep):  Do you wake with a headache in the morning? NO  How often?     Does your bed partner, family or friends ever say that you snore? NO  How many nights per week do you snore?   Can snoring be heard outside the bedroom?     Do you ever wake yourself up from snoring, gasping or choking? NO    Have you ever been told that you stop breathing or have pauses in your breathing? YES    Do you wake in the morning with a dry throat or mouth? YES    Do you have trouble breathing through your nose? NO    Do you have problems with heartburn, reflux or a hiatal hernia? NO    Which positions do you usually sleep in? (stomach, back, sides, all) back    Do you use oxygen or any other medical equipment when you  sleep? NO    Do members of your family (related by blood) snore? NO    Have any members of your family been diagnosed with with sleep apnea? YES    Do other members of your family have restless leg? NO    Do other members of your family have sleep walking? NO    Have you ever had an accident, or near accident due to sleepiness while driving? NO    Does your sleepiness affect your work on the job or at school? DOES NOT APPLY    Do you ever fall asleep by accident while doing a task? NO    Have you had sudden muscle weakness when laughing, angry or surprised? NO    Have you ever been unable to move your body when falling asleep or waking up? NO    Do you ever have trouble  your dreams from real life events? NO  Please describe:     Physical Health: (including illness and injury): During the past 30 days, on how many days was your physical health not good?  days     Mental Health: (including stress, depression, and problems with emotions): During the last 30 days, how may days was your mental health not good?  days.     During the past 30 days, on how many days did poor physical or mental health keep you from doing your usual activities? This might be self-care, work, or play?  days.     Social History:   Marital status:     Who lives in your home with you? spouse    Mother (alive or dead)? dead If has , from what? Colon cancer  Father (alive or dead)? dead If has , from what? Massive heart attack    Siblings: YES  Have any ? YES  If so, from what? Complications of quadriplegia     Currently working? NO  If yes, work:   Former jobs: RN     Sleepiness Scale:   Sitting and reading 1   Watching TV 1   Sitting in a public place 0   Riding in a car 0   Lying down to rest in the afternoon 2   Sitting and talking to someone 0   Sitting quietly after a lunch without alcohol 0   In a car, stopping for a few minutes in traffic 0       Surgical History:   Past Surgical History:    Procedure Laterality Date    APPENDECTOMY      CHOLECYSTECTOMY  1983    COLONOSCOPY  2012    diverticulosis -- due 2017    DENTAL SURGERY  2012    biopsy oral lesion [Other]    ENT SURGERY  1990    upper and lower jaw [Other]    GYN SURGERY  1981    tx of ectopic pregnancy with left tube removal [Other]    JOINT REPLACEMENT  2009    TOTAL KNEE ARTHROPLASTY 2009 Left     OSTEOTOMY FOOT Left 8/2/2018    Procedure: OSTEOTOMY FOOT;  LEFT FOOT/ANKLE: FLATFOOT RECONSTRUCTION: calcaneal osteotomy, medial arch tendon repair/transfer & ligament repair; midfoot osteootmy, gastrocnemius recessionTranfer,Spring Ligament Repair,Cotton Osteotomy;  Surgeon: Mehdi Vidal DPM;  Location: [facility] OR    [facility]  1992    uretherostomy  2005       Medical Conditions:   Past Medical History:   Diagnosis Date    Adenomatous colon polyp 10/17/2011    ADHD (attention deficit hyperactivity disorder) 10/17/2011    Atypical chest pain 8/3/2018    Degenerative disk disease 10/17/2011    Diverticulosis 10/17/2011    Dizziness 09/03/2013    BPV    Epistaxis 9/3/2013    Head trauma 7/13/2012    Psychosis (H) 2/3/2016    Seizures 07/13/2012       Medications:   Current Outpatient Medications   Medication Sig Dispense Refill    acetaminophen (TYLENOL) 325 MG tablet Take 2 tablets (650 mg) by mouth every 4 hours as needed for mild pain 100 tablet 0    albuterol (ACCUNEB) 0.63 MG/3ML neb solution Take 3 mLs (0.63 mg) by nebulization every 6 hours as needed for shortness of breath / dyspnea or wheezing 270 mL 1    albuterol (PROAIR HFA/PROVENTIL HFA/VENTOLIN HFA) 108 (90 Base) MCG/ACT inhaler Inhale 2 puffs into the lungs every 6 hours as needed for shortness of breath 18 g 4    azithromycin (ZITHROMAX) 250 MG tablet TAKE TWO TABLETS BY MOUTH ON DAY 1, THEN TAKE ONE TABLET ONE TIME DAILY ON DAYS 2-5 (Patient not taking: Reported on 6/5/2024)      beclomethasone (QNASL) 80 MCG/ACT nasal aerosol Spray 2 sprays into both nostrils daily 30 g 4     blood glucose (NO BRAND SPECIFIED) test strip Text 1 time by finger poke route every day      cetirizine (ZYRTEC) 10 MG tablet Take 1 tablet (10 mg) by mouth every evening 30 tablet 1    cholecalciferol 5000 UNITS CAPS Take 1 capsule (5,000 Units) by mouth daily (Patient taking differently: Take 2,000 Units by mouth daily Taking 2,00 units not 5,000 units)      ClonazePAM (KLONOPIN PO) Take 0.5 mg by mouth At Bedtime      EPINEPHrine (ANY BX GENERIC EQUIV) 0.3 MG/0.3ML injection 2-pack Inject 0.3 mLs (0.3 mg) into the muscle once as needed for anaphylaxis (Patient not taking: Reported on 6/5/2024) 2 each 1    fluticasone (FLONASE) 50 MCG/ACT nasal spray Spray 2 sprays into both nostrils daily 48 g 3    fluticasone (FLOVENT HFA) 110 MCG/ACT Inhaler Inhale 2 puffs into the lungs 2 times daily      Garlic 10 MG CAPS Take 1 capsule by mouth daily Unsure the specific strength of capsule      KRILL OIL PO Take 250 mg by mouth daily      LANCETS MISC. Test 1 time daily      LEVOTHYROXINE SODIUM PO Take 50 mcg by mouth daily       loratadine (CLARITIN) 10 MG tablet Take 1 tablet (10 mg) by mouth daily 90 tablet 3    metFORMIN (GLUCOPHAGE) 500 MG tablet Take 500 mg by mouth daily      montelukast (SINGULAIR) 10 MG tablet Take 1 tablet (10 mg) by mouth at bedtime 90 tablet 3    MOUNJARO 10 MG/0.5ML pen ADMINISTER 10 MG UNDER THE SKIN 1 TIME A WEEK      MOUNJARO 2.5 MG/0.5ML pen ADMINISTER 2.5 MG UNDER THE SKIN 1 TIME A WEEK (Patient not taking: Reported on 6/5/2024)      Multiple Vitamin (MULTI VITAMIN DAILY PO)       Nitroglycerin (NITROSTAT SL) Place 0.4 mg under the tongue every 5 minutes as needed for chest pain (Patient not taking: Reported on 6/5/2024)      ondansetron (ZOFRAN ODT) 4 MG ODT tab Take 1 tablet (4 mg) by mouth every 8 hours as needed for nausea (Patient not taking: Reported on 6/5/2024) 10 tablet 0    order for DME autoPAP 15-18 cmH20 1 Device 0    predniSONE (DELTASONE) 5 MG tablet TAKE ONE TABLET BY  MOUTH EVERY MORNING WITH FOOD (Patient not taking: Reported on 6/5/2024)      semaglutide (OZEMPIC) 2 MG/3ML pen Inject 0.5 mg Subcutaneous every 7 days (Patient not taking: Reported on 6/5/2024)      SUBLINGUAL IMMUNOTHERAPY, SLIT, Place 1 drop under the tongue 3 times daily 1 vial PRN    TRAZODONE HCL PO Take 100 mg by mouth At Bedtime      Turmeric 500 MG CAPS Take 1 capsule by mouth daily      vitamin C (ASCORBIC ACID) 250 MG TABS tablet Take 500 mg by mouth daily      Zinc 25 MG TABS Take 1 tablet by mouth daily       No current facility-administered medications for this visit.       Are you currently having any of the following symptoms?   General:   Obvious weight gain or loss NO  Fever, chills or sweats NO  Drug allergies: penicillin, sulfa, buprenorphine, some others    Eyes:   Changes in vision NO  Blind spots NO  Double vision NO  Other no    Ear, Nose and Throat:   Ear pain NO  Sore throat NO  Sinus pain YES  Post-nasal drip YES  Runny nose YES  Bloody nose NO    Heart:   Rapid or irregular heart beat NO  Chest pain or pressure NO  Out of breath when lying down NO  Swelling in feet or legs NO  High blood pressure NO  Heart disease NO    Nervous system   Headaches NO  Weakness in arms or legs YES  Numbness in arms of legs YES  Other: no    Skin  Rashes NO  New moles or skin changes NO  Other no    Lungs  Shortness of breath at rest NO  Shortness of breath with activity NO  Dry cough YES  Coughing up mucous or phlegm NO  Coughing up blood NO  Wheezing when breathing NO    Lymph System  Swollen lymph nodes NO  New lumps or bumps NO  Changes in breasts or discharge NO    Digestive System   Nausea or vomiting NO  Loose or watery stools NO  Hard, dry stools (constipation) YES  Fat or grease in stools YES  Blood in stools NO  Stools are black or bloody NO  Abdominal (belly) pain NO    Urinary Tract   Pain when you urinate (pee) NO  Blood in your urine NO  Urinate (pee) more than normal NO  Irregular periods  NO    Muscles and bones   Muscle pain YES  Joint or bone pain YES  Swollen joints NO  Other swelling in joints    Glands  Increased thirst or urination NO  Diabetes YES  Morning glucose: 90  Afternoon glucose: 110    Mental Health  Depression YES  Anxiety YES  Other mental health issues: no

## 2024-06-18 NOTE — PROGRESS NOTES
STOP LEXA       Name: Mattie Carranza MRN# [id number]   Age: 69 year old YOB: 1955     Stop Bang questionnaire completed with a score of >3 to allow for HST     Have you been told you snore loudly (louder than talking or loud enough to be heard through doors)? NO    Do you often feel tired, fatigued, or sleepy during the daytime? YES    Has anyone observed you stop breathing during your sleep? YES    Do you have or are you being treated for high blood pressure? NO    Is your BMI greater than 35? NO    Is your neck size circumference 16 inches or greater? NO    Are you over 50 years old? YES    Stop Bang Score (# of yes): 3

## 2024-06-21 ENCOUNTER — TELEPHONE (OUTPATIENT)
Dept: OTOLARYNGOLOGY | Facility: OTHER | Age: 69
End: 2024-06-21

## 2024-06-21 NOTE — TELEPHONE ENCOUNTER
Mattie needs her monolucostate prescription sent to [facility]. It was called into [facility]. [facility] pharmacy is only for Emergency medications. Please transfer[facility]. She would like a phone call after completed as she has requested this twice.

## 2024-06-28 NOTE — PROGRESS NOTES
Chart review prior to sleep testing.    Patient Summary:  69 year old female who is referred for JOSE.    Patient Active Problem List    Diagnosis Date Noted    Tobacco use disorder 03/08/2019     Priority: Medium    Fibromyalgia 03/08/2019     Priority: Medium    Morbid obesity (H) 03/08/2019     Priority: Medium    Heart murmur 08/03/2018     Priority: Medium     AV sclerosis      Bipolar I disorder (H) 08/03/2018     Priority: Medium    Asthma 08/03/2018     Priority: Medium    Hypothyroidism 08/03/2018     Priority: Medium    Chronic idiopathic thrombocytopenic purpura (H) 08/03/2018     Priority: Medium    Anxiety 10/27/2015     Priority: Medium    Depression 10/27/2015     Priority: Medium    JOSE (obstructive sleep apnea)- moderate (AHI 27) 04/27/2015     Priority: Medium     Sleep study 8/30/2012 (227#) Index 41 events per hour with a low SAT of 69%. Titrated to 13 cm nasal CPAP  Sleep study [facility] 12/05/2013 (228 pounds). No slow-wave or REM sleep. Apnea-hypopnea index of 15 events per hour. Low saturation was 83%. CPAP titrated to a level of 9 cm  Sleep Study [facility] 3/22/17 (251#) AHI 26.7/hour. Low O2 82%      Dysphonia 07/18/2014     Priority: Medium    TMJ (temporomandibular joint syndrome) 07/24/2013     Priority: Medium    Perennial allergic rhinitis 07/24/2013     Priority: Medium    Vision changes 07/24/2013     Priority: Medium    Orthostatic hypotension 07/24/2013     Priority: Medium    Diabetes mellitus, type 2 (H) 03/28/2013     Priority: Medium    Dyslipidemia 03/28/2013     Priority: Medium    Insomnia 09/20/2012     Priority: Medium    Advanced care planning/counseling discussion 04/26/2012     Priority: Medium    Irritable bowel syndrome 04/02/2009     Priority: Medium    Other chronic nonalcoholic liver disease 10/31/2007     Priority: Medium    Hyperlipidemia 11/17/2006     Priority: Medium     Formatting of this note might be different from the original.  IMO Update 10/11       Essential hypertension 11/17/2006     Priority: Medium     Formatting of this note might be different from the original.  IMO Update      Urticaria 03/08/2019     Priority: Low       Current Outpatient Medications   Medication Sig Dispense Refill    acetaminophen (TYLENOL) 325 MG tablet Take 2 tablets (650 mg) by mouth every 4 hours as needed for mild pain 100 tablet 0    albuterol (ACCUNEB) 0.63 MG/3ML neb solution Take 3 mLs (0.63 mg) by nebulization every 6 hours as needed for shortness of breath / dyspnea or wheezing 270 mL 1    albuterol (PROAIR HFA/PROVENTIL HFA/VENTOLIN HFA) 108 (90 Base) MCG/ACT inhaler Inhale 2 puffs into the lungs every 6 hours as needed for shortness of breath 18 g 4    azithromycin (ZITHROMAX) 250 MG tablet TAKE TWO TABLETS BY MOUTH ON DAY 1, THEN TAKE ONE TABLET ONE TIME DAILY ON DAYS 2-5 (Patient not taking: Reported on 6/5/2024)      beclomethasone (QNASL) 80 MCG/ACT nasal aerosol Spray 2 sprays into both nostrils daily 30 g 4    blood glucose (NO BRAND SPECIFIED) test strip Text 1 time by finger poke route every day      cetirizine (ZYRTEC) 10 MG tablet Take 1 tablet (10 mg) by mouth every evening 30 tablet 1    cholecalciferol 5000 UNITS CAPS Take 1 capsule (5,000 Units) by mouth daily (Patient taking differently: Take 2,000 Units by mouth daily Taking 2,00 units not 5,000 units)      ClonazePAM (KLONOPIN PO) Take 0.5 mg by mouth At Bedtime      EPINEPHrine (ANY BX GENERIC EQUIV) 0.3 MG/0.3ML injection 2-pack Inject 0.3 mLs (0.3 mg) into the muscle once as needed for anaphylaxis (Patient not taking: Reported on 6/5/2024) 2 each 1    fluticasone (FLONASE) 50 MCG/ACT nasal spray Spray 2 sprays into both nostrils daily 48 g 3    fluticasone (FLOVENT HFA) 110 MCG/ACT Inhaler Inhale 2 puffs into the lungs 2 times daily      Garlic 10 MG CAPS Take 1 capsule by mouth daily Unsure the specific strength of capsule      KRILL OIL PO Take 250 mg by mouth daily      LANCETS MISC. Test 1 time  daily      LEVOTHYROXINE SODIUM PO Take 50 mcg by mouth daily       loratadine (CLARITIN) 10 MG tablet Take 1 tablet (10 mg) by mouth daily 90 tablet 3    metFORMIN (GLUCOPHAGE) 500 MG tablet Take 500 mg by mouth daily      montelukast (SINGULAIR) 10 MG tablet Take 1 tablet (10 mg) by mouth at bedtime 90 tablet 3    MOUNJARO 10 MG/0.5ML pen ADMINISTER 10 MG UNDER THE SKIN 1 TIME A WEEK      MOUNJARO 2.5 MG/0.5ML pen ADMINISTER 2.5 MG UNDER THE SKIN 1 TIME A WEEK (Patient not taking: Reported on 2024)      Multiple Vitamin (MULTI VITAMIN DAILY PO)       Nitroglycerin (NITROSTAT SL) Place 0.4 mg under the tongue every 5 minutes as needed for chest pain (Patient not taking: Reported on 2024)      ondansetron (ZOFRAN ODT) 4 MG ODT tab Take 1 tablet (4 mg) by mouth every 8 hours as needed for nausea (Patient not taking: Reported on 2024) 10 tablet 0    order for DME autoPAP 15-18 cmH20 1 Device 0    predniSONE (DELTASONE) 5 MG tablet TAKE ONE TABLET BY MOUTH EVERY MORNING WITH FOOD (Patient not taking: Reported on 2024)      semaglutide (OZEMPIC) 2 MG/3ML pen Inject 0.5 mg Subcutaneous every 7 days (Patient not taking: Reported on 2024)      SUBLINGUAL IMMUNOTHERAPY, SLIT, Place 1 drop under the tongue 3 times daily 1 vial PRN    TRAZODONE HCL PO Take 100 mg by mouth At Bedtime      Turmeric 500 MG CAPS Take 1 capsule by mouth daily      vitamin C (ASCORBIC ACID) 250 MG TABS tablet Take 500 mg by mouth daily      Zinc 25 MG TABS Take 1 tablet by mouth daily       No current facility-administered medications for this visit.       Pertinent PMHx of fibromyalgia, JOSE, obesity, DM II, hypothyroidism, chronic idiopathic thrombocytopenic purpura, depression, bipolar 1 disorder.    Prior Sleep Testin2012 (227#) AHI 41 events per hour with a low SAT of 69%. Titrated to 13 cm nasal CPAP   2013 (228 pounds). No slow-wave or REM sleep. Apnea-hypopnea index of 15 events per hour. Low saturation was  "83%. The patient was started on nasal CPAP and titrated to a level of 9 cm, which worked quite well.   3/22/17 (251#) with an AHI 26.7/hour and lowest O2 was 82%     LOV with sleep medicine with Dr. Gonzalez on 3/12/2019.  Using CPAP auto 12-17 cm H2O, adequate usage, AHI 2.6.    Charlotte score of 4.    BMI of Estimated body mass index is 31.32 kg/m  as calculated from the following:    Height as of 6/5/24: 1.727 m (5' 7.99\").    Weight as of 6/5/24: 93.4 kg (205 lb 14.6 oz).     Chief concern per questionnaire: \"Referral from pradeep izquierdo, severe dry moth causing 7 cavities past year, nasal sores that won't heal \"    Duration of symptoms:  \"Couple years\"    Sleep pattern:  Workdays.  ?.  Weekends.  ?.  Time to fall asleep: ~5 minutes.  Awakenings: 1 times per night, 5 minutes to return to sleep.  Average total sleep time:  ? hours  Napping.  1 days per week, 1-2 hours per nap.    Yes for RLS screen.  No for sleep walking.  No for dream enactment behavior.  No for bruxism.    No for morning headaches.  No for snoring.  Yes for observed apnea.  Yes for FHx of JOSE.    Caffeine use:  No for 3+ per day.  No for within 6 hours of bed.    Tobacco use: Yes    SHx:  , lives with spouse.  Not currently working.    A/P:    1.)  History of moderate to severe JOSE  - Interim weight loss of ~50 lbs since last PSG 3/2017  - Potential side effects of oral dryness from CPAP increasing dental caries    It does appear that JOSE may have significantly improved given interim weight loss.  I would recommend getting an up to date picture of severity with either home sleep test or in-lab PSG.      ---  This note was written with the assistance of the Dragon voice-dictation technology software. The final document, although reviewed, may contain errors. For corrections, please contact the office.    Kashmir Doll MD    Sleep Medicine  [facility]  - [place], [place]  Main Office: [phone]  [facility] - [facility], [facility] [place]  [address], [place], [address]  Schedule visits: [phone]  Main Office: [phone]  Fax: [phone]

## 2024-07-06 ENCOUNTER — HEALTH MAINTENANCE LETTER (OUTPATIENT)
Age: 69
End: 2024-07-06

## 2024-07-26 ENCOUNTER — TRANSFERRED RECORDS (OUTPATIENT)
Dept: HEALTH INFORMATION MANAGEMENT | Facility: HOSPITAL | Age: 69
End: 2024-07-26

## 2024-08-20 ENCOUNTER — VIRTUAL VISIT (OUTPATIENT)
Dept: PULMONOLOGY | Facility: OTHER | Age: 69
End: 2024-08-20
Attending: FAMILY MEDICINE
Payer: MEDICARE

## 2024-08-20 VITALS — BODY MASS INDEX: 28.79 KG/M2 | WEIGHT: 190 LBS | HEIGHT: 68 IN

## 2024-08-20 DIAGNOSIS — E11.9 TYPE 2 DIABETES MELLITUS WITHOUT COMPLICATION, WITHOUT LONG-TERM CURRENT USE OF INSULIN (H): Chronic | ICD-10-CM

## 2024-08-20 DIAGNOSIS — G47.33 OSA (OBSTRUCTIVE SLEEP APNEA): Primary | ICD-10-CM

## 2024-08-20 PROCEDURE — 99214 OFFICE O/P EST MOD 30 MIN: CPT | Mod: 95 | Performed by: FAMILY MEDICINE

## 2024-08-20 RX ORDER — GABAPENTIN 300 MG/1
CAPSULE ORAL
COMMUNITY
Start: 2024-08-04

## 2024-08-20 ASSESSMENT — PAIN SCALES - GENERAL: PAINLEVEL: SEVERE PAIN (6)

## 2024-08-20 NOTE — NURSING NOTE
Current patient location: [address] [place] [address] [place] [address]    Is the patient currently in the state of [place]? YES    Visit mode:VIDEO    If the visit is dropped, the patient can be reconnected by: VIDEO VISIT: Text to cell phone:   Telephone Information:   Mobile [phone]       Will anyone else be joining the visit? NO  (If patient encounters technical issues they should call [phone]    How would you like to obtain your AVS? MyChart    Are changes needed to the allergy or medication list? Pt stated no med changes    Are refills needed on medications prescribed by this physician? NO    Rooming Documentation:  Questionnaire(s) completed      Reason for visit: RECHECK    Lo SHAWF

## 2024-08-20 NOTE — PROGRESS NOTES
"  Mattie Carranza is a 69 year old female who is being evaluated via a billable video visit.       The patient has been notified of following:      \"This video visit will be conducted via a call between you and your physician/provider. We have found that certain health care needs can be provided without the need for an in-person physical exam.  This service lets us provide the care you need with a video conversation.  If a prescription is necessary we can send it directly to your pharmacy.  If lab work is needed we can place an order for that and you can then stop by our lab to have the test done at a later time.     Video visits are billed at different rates depending on your insurance coverage.  Please reach out to your insurance provider with any questions.     If during the course of the call the physician/provider feels a video visit is not appropriate, you will not be charged for this service.\"     Patient has given verbal consent for Video visit? Yes  How would you like to obtain your AVS? Mail a copy  If you are dropped from the video visit, the video invite should be resent to: Text to cell phone: -  Will anyone else be joining your video visit? No  If patient encounters technical issues they should call [phone]      Video-Visit Details     Type of service:  Video Visit     Start Time:  0930  End Time: 9:57 AM    Originating Location (pt. Location): Home     Distant Location (provider location):  Off-site, [facility] [place]       Platform used for Video Visit: [employer]    Virtual visit for history of moderate obstructive sleep apnea with concerns for side effects from PAP therapy and suspicion for improvement in JOSE severity with weight loss.     A/P:     1.)  History of moderate to severe JOSE  - Interim weight loss of ~45-50 lbs since last PSG 3/2017  - Potential side effects of oral dryness from CPAP increasing dental caries     It does appear that OJSE may have significantly " "improved given interim weight loss.  I would recommend getting an up to date picture of severity with either home sleep test or in-lab PSG.    We agreed to proceed with reevaluation with a WatchPAT home sleep test, orders placed today.  Given that she largely does not have any significant symptoms of JOSE, if the sleep apnea is mild to moderate with no significant hypoxemia, we likely will consider discontinuation of CPAP.    She also send a copy of her recent presumed Zio patch heart rhythm monitor that was done for frequent PVCs or palpitations.  We will see if there is any concerns here that would also potentially affect our choice regarding JOES treatment.    SUBJECTIVE:  Mattie Carranza is a 69 year old female.    69 year old female who is referred for JOSE.     Pertinent PMHx of fibromyalgia, JOSE, obesity, DM II, hypothyroidism, chronic idiopathic thrombocytopenic purpura, depression, bipolar 1 disorder.     Prior Sleep Testin2012 (227#) AHI 41 events per hour with a low SAT of 69%. Titrated to 13 cm nasal CPAP   2013 (228 pounds). No slow-wave or REM sleep. Apnea-hypopnea index of 15 events per hour. Low saturation was 83%. The patient was started on nasal CPAP and titrated to a level of 9 cm, which worked quite well.   3/22/17 (251#) with an AHI 26.7/hour and lowest O2 was 82%      LOV with sleep medicine with Dr. Gonzalez on 3/12/2019.  Using CPAP auto 12-17 cm H2O, adequate usage, AHI 2.6.     Hebron score of 4.     BMI of Estimated body mass index is 31.32 kg/m  as calculated from the following:    Height as of 24: 1.727 m (5' 7.99\").    Weight as of 24: 93.4 kg (205 lb 14.6 oz).      Today -reviewed her history today including previous sleep testing.  There is a fairly strong suspicion that oral dryness from the CPAP is the underlying cause for her recurrent cavities.  She had 3 cavities last year and for this year.  She is otherwise very consistent with adhering to recommendations " "regarding dental care, use of different items to reduce oral dryness.    She does report a history of some PVCs, and did undergo a Zio patch rhythm monitor with her primary doctor with the [employer].  I did not have a copy of that to review today.    She is smoking roughly 1/2 pack/day.  No regular alcohol use.  She does note a strong family history of cardiovascular disease.    Chief concern per questionnaire: \"Referral from pradeep izquierdo, severe dry moth causing 7 cavities past year, nasal sores that won't heal \"     Duration of symptoms:  \"Couple years\"     Sleep pattern:  Workdays.  ?.  Weekends.  ?.  Time to fall asleep: ~5 minutes.  Awakenings: 1 times per night, 5 minutes to return to sleep.  Average total sleep time:  ? hours  Napping.  1 days per week, 1-2 hours per nap.     Yes for RLS screen.  No for sleep walking.  No for dream enactment behavior.  No for bruxism.     No for morning headaches.  No for snoring.  Yes for observed apnea.  Yes for FHx of JOSE.     Caffeine use:  No for 3+ per day.  No for within 6 hours of bed.     Tobacco use: Yes     SHx:  , lives with spouse.  Not currently working.      Past medical history:    Patient Active Problem List    Diagnosis Date Noted    Tobacco use disorder 03/08/2019     Priority: Medium    Fibromyalgia 03/08/2019     Priority: Medium    Morbid obesity (H) 03/08/2019     Priority: Medium    Heart murmur 08/03/2018     Priority: Medium     AV sclerosis      Bipolar I disorder (H) 08/03/2018     Priority: Medium    Asthma 08/03/2018     Priority: Medium    Hypothyroidism 08/03/2018     Priority: Medium    Chronic idiopathic thrombocytopenic purpura (H) 08/03/2018     Priority: Medium    Anxiety 10/27/2015     Priority: Medium    Depression 10/27/2015     Priority: Medium    JOSE (obstructive sleep apnea)- moderate (AHI 27) 04/27/2015     Priority: Medium     Sleep study 8/30/2012 (227#) Index 41 events per hour with a low SAT of 69%. Titrated to 13 " cm nasal CPAP  Sleep study [facility] 12/05/2013 (228 pounds). No slow-wave or REM sleep. Apnea-hypopnea index of 15 events per hour. Low saturation was 83%. CPAP titrated to a level of 9 cm  Sleep Study [facility] 3/22/17 (251#) AHI 26.7/hour. Low O2 82%      Dysphonia 07/18/2014     Priority: Medium    TMJ (temporomandibular joint syndrome) 07/24/2013     Priority: Medium    Perennial allergic rhinitis 07/24/2013     Priority: Medium    Vision changes 07/24/2013     Priority: Medium    Orthostatic hypotension 07/24/2013     Priority: Medium    Diabetes mellitus, type 2 (H) 03/28/2013     Priority: Medium    Dyslipidemia 03/28/2013     Priority: Medium    Insomnia 09/20/2012     Priority: Medium    Advanced care planning/counseling discussion 04/26/2012     Priority: Medium    Irritable bowel syndrome 04/02/2009     Priority: Medium    Other chronic nonalcoholic liver disease 10/31/2007     Priority: Medium    Hyperlipidemia 11/17/2006     Priority: Medium     Formatting of this note might be different from the original.  IMO Update 10/11      Essential hypertension 11/17/2006     Priority: Medium     Formatting of this note might be different from the original.  IMO Update      Urticaria 03/08/2019     Priority: Low       10 point ROS of systems including Constitutional, Eyes, Respiratory, Cardiovascular, Gastroenterology, Genitourinary, Integumentary, Muscularskeletal, Psychiatric were all negative except for pertinent positives noted in my HPI.    Current Outpatient Medications   Medication Sig Dispense Refill    acetaminophen (TYLENOL) 325 MG tablet Take 2 tablets (650 mg) by mouth every 4 hours as needed for mild pain 100 tablet 0    albuterol (ACCUNEB) 0.63 MG/3ML neb solution Take 3 mLs (0.63 mg) by nebulization every 6 hours as needed for shortness of breath / dyspnea or wheezing 270 mL 1    albuterol (PROAIR HFA/PROVENTIL HFA/VENTOLIN HFA) 108 (90 Base) MCG/ACT inhaler Inhale 2 puffs into the lungs  every 6 hours as needed for shortness of breath 18 g 4    azithromycin (ZITHROMAX) 250 MG tablet TAKE TWO TABLETS BY MOUTH ON DAY 1, THEN TAKE ONE TABLET ONE TIME DAILY ON DAYS 2-5 (Patient not taking: Reported on 6/5/2024)      beclomethasone (QNASL) 80 MCG/ACT nasal aerosol Spray 2 sprays into both nostrils daily 30 g 4    blood glucose (NO BRAND SPECIFIED) test strip Text 1 time by finger poke route every day      cetirizine (ZYRTEC) 10 MG tablet Take 1 tablet (10 mg) by mouth every evening 30 tablet 1    cholecalciferol 5000 UNITS CAPS Take 1 capsule (5,000 Units) by mouth daily (Patient taking differently: Take 2,000 Units by mouth daily Taking 2,00 units not 5,000 units)      ClonazePAM (KLONOPIN PO) Take 0.5 mg by mouth At Bedtime      EPINEPHrine (ANY BX GENERIC EQUIV) 0.3 MG/0.3ML injection 2-pack Inject 0.3 mLs (0.3 mg) into the muscle once as needed for anaphylaxis (Patient not taking: Reported on 6/5/2024) 2 each 1    fluticasone (FLONASE) 50 MCG/ACT nasal spray Spray 2 sprays into both nostrils daily 48 g 3    fluticasone (FLOVENT HFA) 110 MCG/ACT Inhaler Inhale 2 puffs into the lungs 2 times daily      Garlic 10 MG CAPS Take 1 capsule by mouth daily Unsure the specific strength of capsule      KRILL OIL PO Take 250 mg by mouth daily      LANCETS MISC. Test 1 time daily      LEVOTHYROXINE SODIUM PO Take 50 mcg by mouth daily       loratadine (CLARITIN) 10 MG tablet Take 1 tablet (10 mg) by mouth daily 90 tablet 3    metFORMIN (GLUCOPHAGE) 500 MG tablet Take 500 mg by mouth daily      montelukast (SINGULAIR) 10 MG tablet Take 1 tablet (10 mg) by mouth at bedtime 90 tablet 3    MOUNJARO 10 MG/0.5ML pen ADMINISTER 10 MG UNDER THE SKIN 1 TIME A WEEK      MOUNJARO 2.5 MG/0.5ML pen ADMINISTER 2.5 MG UNDER THE SKIN 1 TIME A WEEK (Patient not taking: Reported on 6/5/2024)      Multiple Vitamin (MULTI VITAMIN DAILY PO)       Nitroglycerin (NITROSTAT SL) Place 0.4 mg under the tongue every 5 minutes as needed for  chest pain (Patient not taking: Reported on 6/5/2024)      ondansetron (ZOFRAN ODT) 4 MG ODT tab Take 1 tablet (4 mg) by mouth every 8 hours as needed for nausea (Patient not taking: Reported on 6/5/2024) 10 tablet 0    order for DME autoPAP 15-18 cmH20 1 Device 0    predniSONE (DELTASONE) 5 MG tablet TAKE ONE TABLET BY MOUTH EVERY MORNING WITH FOOD (Patient not taking: Reported on 6/5/2024)      semaglutide (OZEMPIC) 2 MG/3ML pen Inject 0.5 mg Subcutaneous every 7 days (Patient not taking: Reported on 6/5/2024)      SUBLINGUAL IMMUNOTHERAPY, SLIT, Place 1 drop under the tongue 3 times daily 1 vial PRN    TRAZODONE HCL PO Take 100 mg by mouth At Bedtime      Turmeric 500 MG CAPS Take 1 capsule by mouth daily      vitamin C (ASCORBIC ACID) 250 MG TABS tablet Take 500 mg by mouth daily      Zinc 25 MG TABS Take 1 tablet by mouth daily         OBJECTIVE:  There were no vitals taken for this visit.    Physical Exam     ---  This note was written with the assistance of the Dragon voice-dictation technology software. The final document, although reviewed, may contain errors. For corrections, please contact the office.    Total time spent preparing to see the patient, review of chart, obtaining history and physical examination, review of sleep testing, review of treatment options, education, discussion with patient and documenting in Epic / EMR was 30 minutes.  All time involved was spent on the day of service for the patient (the same day as the patient's appointment).    Kashmir Doll MD    Sleep Medicine  [place], [place]  Main Office: [phone]  [facility] [place], [place]  [address], [address]  Schedule visits: [phone]  Main Office: [phone]  Fax: [phone]

## 2024-08-30 DIAGNOSIS — J30.89 PERENNIAL ALLERGIC RHINITIS: ICD-10-CM

## 2024-08-30 RX ORDER — MONTELUKAST SODIUM 10 MG/1
1 TABLET ORAL AT BEDTIME
Qty: 90 TABLET | Refills: 3 | Status: SHIPPED | OUTPATIENT
Start: 2024-08-30

## 2024-08-30 NOTE — TELEPHONE ENCOUNTER
MONTELUKAST NA 10MG TAB       Last Written Prescription Date:  6/5/24  Last Fill Quantity: 90,   # refills: 3  Last Office Visit: 6/5/24  Future Office visit:       Routing refill request to provider for review/approval because:

## 2024-09-04 ENCOUNTER — VIRTUAL VISIT (OUTPATIENT)
Dept: SLEEP MEDICINE | Facility: HOSPITAL | Age: 69
End: 2024-09-04
Attending: FAMILY MEDICINE
Payer: MEDICARE

## 2024-09-04 DIAGNOSIS — G47.33 OSA (OBSTRUCTIVE SLEEP APNEA): Chronic | ICD-10-CM

## 2024-09-04 DIAGNOSIS — G47.00 INSOMNIA, UNSPECIFIED: ICD-10-CM

## 2024-09-04 DIAGNOSIS — E66.01 MORBID OBESITY (H): Primary | Chronic | ICD-10-CM

## 2024-09-05 NOTE — PROGRESS NOTES
Patient was provided both verbal and written education and instructions on use of Watch PAT device. Watch PAT device has been registered and shipped via [employer] on 9/5/2024. Patient was notified that package was mailed out. Watch PAT serial number: [id number]    Tracking # [id number].

## 2024-09-18 ENCOUNTER — DOCUMENTATION ONLY (OUTPATIENT)
Dept: SLEEP MEDICINE | Facility: HOSPITAL | Age: 69
End: 2024-09-18
Attending: FAMILY MEDICINE
Payer: MEDICARE

## 2024-09-18 DIAGNOSIS — E11.9 TYPE 2 DIABETES MELLITUS WITHOUT COMPLICATION, WITHOUT LONG-TERM CURRENT USE OF INSULIN (H): Chronic | ICD-10-CM

## 2024-09-18 DIAGNOSIS — E66.01 MORBID OBESITY (H): Primary | Chronic | ICD-10-CM

## 2024-09-18 DIAGNOSIS — G47.33 OSA (OBSTRUCTIVE SLEEP APNEA): ICD-10-CM

## 2024-09-18 DIAGNOSIS — G47.00 INSOMNIA, UNSPECIFIED: ICD-10-CM

## 2024-09-18 PROCEDURE — 95800 SLP STDY UNATTENDED: CPT

## 2024-09-18 PROCEDURE — 95800 SLP STDY UNATTENDED: CPT | Mod: 26 | Performed by: FAMILY MEDICINE

## 2024-09-18 NOTE — PROGRESS NOTES
WatchPAT data has been received and has been scored using rule 1B, 4%. Patient to follow up with provider to determine appropriate therapy.    Pat AHI: 37.8    Ordering Provider: Dr Kashmir Doll      Sleep Technician/Technologist: Kae CARBONE

## 2024-09-19 NOTE — PROCEDURES
"[facility] - HOME SLEEP STUDY INTERPRETATION    Patient: Mattie Carranza  MRN: [id number]  YOB: 1955  Study Date: 2024  Referring Provider: Anali Del Rio  Ordering Provider: Kashmir Doll MD, MD    Chain of custody patient verification was not enabled.       Indications for Home Study: Mattie Carranza is a 69 year old female with a history of fibromyalgia, JOSE, obesity, DM II, hypothyroidism, chronic idiopathic thrombocytopenic purpura, depression, bipolar 1 disorder who presents with desire to assess current severity of obstructive sleep apnea given interim 45-50 pound weight loss since previous sleep testing.    Prior Sleep Testin2012 (227#) AHI 41 events per hour with a low SAT of 69%. Titrated to 13 cm nasal CPAP   2013 (228 pounds). No slow-wave or REM sleep. Apnea-hypopnea index of 15 events per hour. Low saturation was 83%. The patient was started on nasal CPAP and titrated to a level of 9 cm, which worked quite well.   3/22/17 (251#) with an AHI 26.7/hour and lowest O2 was 82%     Estimated body mass index is 28.89 kg/m  as calculated from the following:    Height as of 24: 1.727 m (5' 8\").    Weight as of 24: 86.2 kg (190 lb).    Data: A full night home sleep study was performed recording the standard physiologic parameters including peripheral arterial tonometry (PAT), sound/snoring, body position,  movement, sound, and oxygen saturation by pulse oximetry. Pulse rate was estimated by oximetry recording. Sleep staging (wake, REM, light, and deep sleep) was derived from PAT signal.  This study was considered adequate based on > 4 hours of quality oximetry and respiratory recording. As specified by the AASM Manual for the Scoring of Sleep and Associated events, version 2.3, Rule VIII.D 1B, 4% oxygen desaturation scoring for hypopneas is used as a standard of care on all home sleep apnea testing.    Total Recording Time: 7 hrs, 53 min  Total Sleep Time: 7 " hrs, 23 min  % of Sleep Time REM: N/A%  REM detection was inconclusive.    Respiratory:  Snoring: Snoring was present.  Respiratory events: The PAT respiratory disturbance index [pRDI] was 39 events per hour.  The PAT apnea/hypopnea index [pAHI] was 37.8 events per hour.  IVONNE was 26.1 events per hour.  During REM sleep the pAHI was N/A.  Sleep Associated Hypoxemia: sustained hypoxemia was present. Mean oxygen saturation was 88%.  Minimum was 74%.  Time with saturation less than 88% was 248.1 minutes.    Heart Rate: By pulse oximetry normal rate was noted.  No atrial fibrillation noted on cardiac rhythm analysis.    Position: Percent of time spent: supine - 93.1%, prone - 0%, on right - 6.9%, on left - 0%.  pAHI was 40.6 per hour supine, - per hour prone, 4 per hour on right side, and - per hour on left side.     Assessment:   Severe obstructive sleep apnea.  Sleep associated hypoxemia was present.  Suggestive of sustained hypoxemia    Recommendations:  Consider polysomnography with full night PAP titration with consideration for bilevel PAP with potential indication for supplemental oxygen, would also recommend TCM and pre-study VBG in this scenario.  Suggest optimizing sleep hygiene and avoiding sleep deprivation.  Weight management.    Diagnosis Code(s): Obstructive Sleep Apnea G47.33, Hypoxemia G47.36    Kashmir Doll MD, MD, September 19, 2024   Diplomate, American Board of Family Medicine, Sleep Medicine

## 2024-10-14 NOTE — PROGRESS NOTES
"Virtual Visit Details    Type of service:  Video Visit   Video Start Time:  1100  Video End Time: 1120    Originating Location (pt. Location): Home    Distant Location (provider location):  Off-site  Platform used for Video Visit: Lynda Carranza is a 69 year old female who is being evaluated via a billable video visit.       The patient has been notified of following:      \"This video visit will be conducted via a call between you and your physician/provider. We have found that certain health care needs can be provided without the need for an in-person physical exam.  This service lets us provide the care you need with a video conversation.  If a prescription is necessary we can send it directly to your pharmacy.  If lab work is needed we can place an order for that and you can then stop by our lab to have the test done at a later time.     Video visits are billed at different rates depending on your insurance coverage.  Please reach out to your insurance provider with any questions.     If during the course of the call the physician/provider feels a video visit is not appropriate, you will not be charged for this service.\"     Patient has given verbal consent for Video visit? Yes  How would you like to obtain your AVS? Mail a copy  If you are dropped from the video visit, the video invite should be resent to: Text to cell phone: -  Will anyone else be joining your video visit? No  If patient encounters technical issues they should call [phone]      Video-Visit Details     Type of service:  Video Visit     Virtual visit for review of home sleep testing results.     A/P:     1.)  Per [facility] HST 9/17/2024 with BMI 29, severity similar with pAHI 37.8 and concerns for sustained hypoxemia.    Potential side effects of oral dryness from CPAP increasing dental caries.     She has noted improvement in oral dryness since changing back to her Quatro fx mask, and this was verified on CPAP download.  I do " suspect there could still be some excessive pressure, as well as benefit of increasing humidity level.    Our plan for today:  We will decrease her pressure minimum to 12, so new setting of 12-18 cm H2O  We will increase humidity level from 2 up to 4  Plan for follow-up in 1 month.  If doing well, I did recommend home overnight oximetry on CPAP to ensure no residual hypoxemia.    The longitudinal plan of care for the diagnosis(es)/condition(s) as documented were addressed during this visit. Due to the added complexity in care, I will continue to support Mattie in the subsequent management and with ongoing continuity of care.      SUBJECTIVE:  Mattie Carranza is a 69 year old female.    Pertinent PMHx of fibromyalgia, JOSE, obesity, DM II, hypothyroidism, chronic idiopathic thrombocytopenic purpura, depression, bipolar 1 disorder.     Prior Sleep Testin2012 (227#) AHI 41 events per hour with a low SAT of 69%. Titrated to 13 cm nasal CPAP   2013 (228 pounds). No slow-wave or REM sleep. Apnea-hypopnea index of 15 events per hour. Low saturation was 83%. The patient was started on nasal CPAP and titrated to a level of 9 cm, which worked quite well.   3/22/17 (251#) with an AHI 26.7/hour and lowest O2 was 82%      LOV with sleep medicine with Dr. Gonzalez on 3/12/2019.  Using CPAP auto 12-17 cm H2O, adequate usage, AHI 2.6.    2024 -reviewed her history today including previous sleep testing.  There is a fairly strong suspicion that oral dryness from the CPAP is the underlying cause for her recurrent cavities.  She had 3 cavities last year and for this year.  She is otherwise very consistent with adhering to recommendations regarding dental care, use of different items to reduce oral dryness.     She does report a history of some PVCs, and did undergo a Zio patch rhythm monitor with her primary doctor with the [facility].  I did not have a copy of that to review today.     She is smoking roughly  "1/2 pack/day.  No regular alcohol use.  She does note a strong family history of cardiovascular disease.     Chief concern per questionnaire: \"Referral from pradeep izquierdo, severe dry moth causing 7 cavities past year, nasal sores that won't heal \"     Duration of symptoms:  \"Couple years\"     Sleep pattern:  Workdays.  ?.  Weekends.  ?.  Time to fall asleep: ~5 minutes.  Awakenings: 1 times per night, 5 minutes to return to sleep.  Average total sleep time:  ? hours  Napping.  1 days per week, 1-2 hours per nap.     Yes for RLS screen.  No for sleep walking.  No for dream enactment behavior.  No for bruxism.     No for morning headaches.  No for snoring.  Yes for observed apnea.  Yes for FHx of JOSE.     Caffeine use:  No for 3+ per day.  No for within 6 hours of bed.     Tobacco use: Yes     SHx:  , lives with spouse.  Not currently working.    A/P for [facility] HST to see if improvement in JOSE severity.    Today -overall, we reviewed her home sleep test results in detail.  She feels that her oral dryness has been much better since she changed back to the Quatro fx mask about 10 days ago and there has been much less leak.    CPAP download reviewed from 9/16/2024 through 10/15/2024 on auto titrate 815-18 cm H2O.  Used 25 of 30 days, average daily usage days used 6 hours 47 minutes.  Pressure median 15.7, 95th percentile 17.6 cm H2O.  AHI 2.3.  Leak has been very low since 10/6/2024, and would correlate with changing to the Quatro mask.  Current humidity level 2.    [facility] - HOME SLEEP STUDY INTERPRETATION     Patient: Mattie Carranza  MRN: [id number]  YOB: 1955  Study Date: 9/17/2024  Referring Provider: Anali Del Roi  Ordering Provider: Kashmir Doll MD, MD     Chain of custody patient verification was not enabled.       Indications for Home Study: Mattie Carranza is a 69 year old female with a history of fibromyalgia, JOSE, obesity, DM II, hypothyroidism, chronic idiopathic " "thrombocytopenic purpura, depression, bipolar 1 disorder who presents with desire to assess current severity of obstructive sleep apnea given interim 45-50 pound weight loss since previous sleep testing.     Prior Sleep Testin2012 (227#) AHI 41 events per hour with a low SAT of 69%. Titrated to 13 cm nasal CPAP   2013 (228 pounds). No slow-wave or REM sleep. Apnea-hypopnea index of 15 events per hour. Low saturation was 83%. The patient was started on nasal CPAP and titrated to a level of 9 cm, which worked quite well.   3/22/17 (251#) with an AHI 26.7/hour and lowest O2 was 82%      Estimated body mass index is 28.89 kg/m  as calculated from the following:    Height as of 24: 1.727 m (5' 8\").    Weight as of 24: 86.2 kg (190 lb).     Data: A full night home sleep study was performed recording the standard physiologic parameters including peripheral arterial tonometry (PAT), sound/snoring, body position,  movement, sound, and oxygen saturation by pulse oximetry. Pulse rate was estimated by oximetry recording. Sleep staging (wake, REM, light, and deep sleep) was derived from PAT signal.  This study was considered adequate based on > 4 hours of quality oximetry and respiratory recording. As specified by the AASM Manual for the Scoring of Sleep and Associated events, version 2.3, Rule VIII.D 1B, 4% oxygen desaturation scoring for hypopneas is used as a standard of care on all home sleep apnea testing.     Total Recording Time: 7 hrs, 53 min  Total Sleep Time: 7 hrs, 23 min  % of Sleep Time REM: N/A%  REM detection was inconclusive.     Respiratory:  Snoring: Snoring was present.  Respiratory events: The PAT respiratory disturbance index [pRDI] was 39 events per hour.  The PAT apnea/hypopnea index [pAHI] was 37.8 events per hour.  IVONNE was 26.1 events per hour.  During REM sleep the pAHI was N/A.  Sleep Associated Hypoxemia: sustained hypoxemia was present. Mean oxygen saturation was 88%.  Minimum " was 74%.  Time with saturation less than 88% was 248.1 minutes.     Heart Rate: By pulse oximetry normal rate was noted.  No atrial fibrillation noted on cardiac rhythm analysis.     Position: Percent of time spent: supine - 93.1%, prone - 0%, on right - 6.9%, on left - 0%.  pAHI was 40.6 per hour supine, - per hour prone, 4 per hour on right side, and - per hour on left side.      Assessment:   Severe obstructive sleep apnea.  Sleep associated hypoxemia was present.  Suggestive of sustained hypoxemia     Recommendations:  Consider polysomnography with full night PAP titration with consideration for bilevel PAP with potential indication for supplemental oxygen, would also recommend TCM and pre-study VBG in this scenario.  Suggest optimizing sleep hygiene and avoiding sleep deprivation.  Weight management.     Diagnosis Code(s): Obstructive Sleep Apnea G47.33, Hypoxemia G47.36     Kashmir Doll MD, MD, September 19, 2024   Diplomate, American Board of Family Medicine, Sleep Medicine    Past medical history:    Patient Active Problem List    Diagnosis Date Noted    Tobacco use disorder 03/08/2019     Priority: Medium    Fibromyalgia 03/08/2019     Priority: Medium    Morbid obesity (H) 03/08/2019     Priority: Medium    Heart murmur 08/03/2018     Priority: Medium     AV sclerosis      Bipolar I disorder (H) 08/03/2018     Priority: Medium    Asthma 08/03/2018     Priority: Medium    Hypothyroidism 08/03/2018     Priority: Medium    Chronic idiopathic thrombocytopenic purpura (H) 08/03/2018     Priority: Medium    Anxiety 10/27/2015     Priority: Medium    Depression 10/27/2015     Priority: Medium    JOSE (obstructive sleep apnea)- moderate (AHI 27) 04/27/2015     Priority: Medium     Sleep study 8/30/2012 (227#) Index 41 events per hour with a low SAT of 69%. Titrated to 13 cm nasal CPAP  Sleep study [facility] 12/05/2013 (228 pounds). No slow-wave or REM sleep. Apnea-hypopnea index of 15 events per hour. Low  saturation was 83%. CPAP titrated to a level of 9 cm  Sleep Study [facility] 3/22/17 (251#) AHI 26.7/hour. Low O2 82%      Dysphonia 07/18/2014     Priority: Medium    TMJ (temporomandibular joint syndrome) 07/24/2013     Priority: Medium    Perennial allergic rhinitis 07/24/2013     Priority: Medium    Vision changes 07/24/2013     Priority: Medium    Orthostatic hypotension 07/24/2013     Priority: Medium    Diabetes mellitus, type 2 (H) 03/28/2013     Priority: Medium    Dyslipidemia 03/28/2013     Priority: Medium    Insomnia 09/20/2012     Priority: Medium    Advanced care planning/counseling discussion 04/26/2012     Priority: Medium    Irritable bowel syndrome 04/02/2009     Priority: Medium    Other chronic nonalcoholic liver disease 10/31/2007     Priority: Medium    Hyperlipidemia 11/17/2006     Priority: Medium     Formatting of this note might be different from the original.  IMO Update 10/11      Essential hypertension 11/17/2006     Priority: Medium     Formatting of this note might be different from the original.  IMO Update      Urticaria 03/08/2019     Priority: Low       10 point ROS of systems including Constitutional, Eyes, Respiratory, Cardiovascular, Gastroenterology, Genitourinary, Integumentary, Muscularskeletal, Psychiatric were all negative except for pertinent positives noted in my HPI.    Current Outpatient Medications   Medication Sig Dispense Refill    acetaminophen (TYLENOL) 325 MG tablet Take 2 tablets (650 mg) by mouth every 4 hours as needed for mild pain 100 tablet 0    albuterol (ACCUNEB) 0.63 MG/3ML neb solution Take 3 mLs (0.63 mg) by nebulization every 6 hours as needed for shortness of breath / dyspnea or wheezing 270 mL 1    albuterol (PROAIR HFA/PROVENTIL HFA/VENTOLIN HFA) 108 (90 Base) MCG/ACT inhaler Inhale 2 puffs into the lungs every 6 hours as needed for shortness of breath 18 g 4    azithromycin (ZITHROMAX) 250 MG tablet TAKE TWO TABLETS BY MOUTH ON DAY 1, THEN  TAKE ONE TABLET ONE TIME DAILY ON DAYS 2-5 (Patient not taking: Reported on 6/5/2024)      beclomethasone (QNASL) 80 MCG/ACT nasal aerosol Spray 2 sprays into both nostrils daily 30 g 4    blood glucose (NO BRAND SPECIFIED) test strip Text 1 time by finger poke route every day      cetirizine (ZYRTEC) 10 MG tablet Take 1 tablet (10 mg) by mouth every evening 30 tablet 1    cholecalciferol 5000 UNITS CAPS Take 1 capsule (5,000 Units) by mouth daily (Patient taking differently: Take 2,000 Units by mouth daily Taking 2,00 units not 5,000 units)      ClonazePAM (KLONOPIN PO) Take 0.5 mg by mouth At Bedtime      EPINEPHrine (ANY BX GENERIC EQUIV) 0.3 MG/0.3ML injection 2-pack Inject 0.3 mLs (0.3 mg) into the muscle once as needed for anaphylaxis 2 each 1    fluticasone (FLONASE) 50 MCG/ACT nasal spray Spray 2 sprays into both nostrils daily 48 g 3    fluticasone (FLOVENT HFA) 110 MCG/ACT Inhaler Inhale 2 puffs into the lungs 2 times daily      gabapentin (NEURONTIN) 300 MG capsule       Garlic 10 MG CAPS Take 1 capsule by mouth daily Unsure the specific strength of capsule      KRILL OIL PO Take 250 mg by mouth daily      LANCETS MISC. Test 1 time daily      LEVOTHYROXINE SODIUM PO Take 50 mcg by mouth daily       loratadine (CLARITIN) 10 MG tablet Take 1 tablet (10 mg) by mouth daily 90 tablet 3    metFORMIN (GLUCOPHAGE) 500 MG tablet Take 500 mg by mouth daily      montelukast (SINGULAIR) 10 MG tablet TAKE ONE TABLET BY MOUTH EVERY DAY AT BEDTIME 90 tablet 3    MOUNJARO 10 MG/0.5ML pen ADMINISTER 10 MG UNDER THE SKIN 1 TIME A WEEK      MOUNJARO 2.5 MG/0.5ML pen ADMINISTER 2.5 MG UNDER THE SKIN 1 TIME A WEEK (Patient not taking: Reported on 6/5/2024)      Multiple Vitamin (MULTI VITAMIN DAILY PO)       Nitroglycerin (NITROSTAT SL) Place 0.4 mg under the tongue every 5 minutes as needed for chest pain      ondansetron (ZOFRAN ODT) 4 MG ODT tab Take 1 tablet (4 mg) by mouth every 8 hours as needed for nausea 10 tablet 0     order for DME autoPAP 15-18 cmH20 1 Device 0    predniSONE (DELTASONE) 5 MG tablet TAKE ONE TABLET BY MOUTH EVERY MORNING WITH FOOD (Patient not taking: Reported on 6/5/2024)      semaglutide (OZEMPIC) 2 MG/3ML pen Inject 0.5 mg Subcutaneous every 7 days (Patient not taking: Reported on 6/5/2024)      SUBLINGUAL IMMUNOTHERAPY, SLIT, Place 1 drop under the tongue 3 times daily 1 vial PRN    TRAZODONE HCL PO Take 100 mg by mouth At Bedtime      Turmeric 500 MG CAPS Take 1 capsule by mouth daily      vitamin C (ASCORBIC ACID) 250 MG TABS tablet Take 500 mg by mouth daily      Zinc 25 MG TABS Take 1 tablet by mouth daily         OBJECTIVE:  There were no vitals taken for this visit.    Physical Exam     ---  This note was written with the assistance of the Dragon voice-dictation technology software. The final document, although reviewed, may contain errors. For corrections, please contact the office.    Total time spent preparing to see the patient, review of chart, obtaining history and physical examination, review of sleep testing, review of treatment options, education, discussion with patient and documenting in Epic / EMR was 25 minutes.  All time involved was spent on the day of service for the patient (the same day as the patient's appointment).    Kashmir Doll MD    Sleep Medicine  [place], [place]  Main Office: [phone]  [facility] [place], [place]  [address] [facility], [address]  Schedule visits: [phone]  Main Office: [phone]  Fax: [phone]

## 2024-10-15 ENCOUNTER — VIRTUAL VISIT (OUTPATIENT)
Dept: PULMONOLOGY | Facility: OTHER | Age: 69
End: 2024-10-15
Attending: FAMILY MEDICINE
Payer: MEDICARE

## 2024-10-15 VITALS
WEIGHT: 190 LBS | HEIGHT: 68 IN | BODY MASS INDEX: 28.79 KG/M2 | SYSTOLIC BLOOD PRESSURE: 120 MMHG | DIASTOLIC BLOOD PRESSURE: 72 MMHG

## 2024-10-15 DIAGNOSIS — G47.33 OSA (OBSTRUCTIVE SLEEP APNEA): Primary | ICD-10-CM

## 2024-10-15 PROCEDURE — 99213 OFFICE O/P EST LOW 20 MIN: CPT | Mod: 95 | Performed by: FAMILY MEDICINE

## 2024-10-15 PROCEDURE — G2211 COMPLEX E/M VISIT ADD ON: HCPCS | Mod: 95 | Performed by: FAMILY MEDICINE

## 2024-10-15 ASSESSMENT — PAIN SCALES - GENERAL: PAINLEVEL: SEVERE PAIN (6)

## 2024-10-15 NOTE — NURSING NOTE
Current patient location: [address] [place] [id number]    Is the patient currently in the state of [place]? YES    Visit mode:VIDEO    If the visit is dropped, the patient can be reconnected by: VIDEO VISIT: Text to cell phone:   Telephone Information:   Mobile [phone]       Will anyone else be joining the visit? NO  (If patient encounters technical issues they should call [phone]    Are changes needed to the allergy or medication list? No    Are refills needed on medications prescribed by this physician? NO    Rooming Documentation:  Questionnaire(s) not pre-assigned    Reason for visit: RECHECK    Lanny SHAWF

## 2025-01-11 ENCOUNTER — HEALTH MAINTENANCE LETTER (OUTPATIENT)
Age: 70
End: 2025-01-11

## (undated) DEVICE — SOL NACL 0.9% IRRIG 1000ML BOTTLE 07138-09

## (undated) DEVICE — GLOVE PROTEXIS POWDER FREE 8.0 ORTHOPEDIC 2D73ET80

## (undated) DEVICE — CAST PADDING 6" STERILE 9046S

## (undated) DEVICE — DRSG ABDOMINAL 07 1/2X8" 7197D

## (undated) DEVICE — BLADE SAW OSCILLATING STRYK MED 9.0X25X0.38MM 2296-003-111

## (undated) DEVICE — DRAPE EXTREMITY W/ARMBOARD 29405

## (undated) DEVICE — DRAPE C-ARM 60X42" 1013

## (undated) DEVICE — DRAPE SHEET REV FOLD 3/4 9349

## (undated) DEVICE — PACK EXTREMITY LATEX FREE SOP32HFFCS

## (undated) DEVICE — DRILL BIT ARTHREX BB TAK MTP THREADED AR-13226T

## (undated) DEVICE — SU ETHILON 3-0 PS-2 18" 1669H

## (undated) DEVICE — DRSG GAUZE 4X4" TRAY

## (undated) DEVICE — SPLINT SCOTCHCAST 5X30" ONE STEP

## (undated) DEVICE — GOWN XLG DISP 9545

## (undated) DEVICE — PREP CHLORAPREP 26ML TINTED ORANGE  260815

## (undated) DEVICE — DRSG JUMPSTART ANTIMICROBIAL 4X4" ABS-4004

## (undated) DEVICE — BNDG ELASTIC 6" DBL LENGTH UNSTERILE 6611-16

## (undated) DEVICE — GLOVE PROTEXIS POWDER FREE 7.5 ORTHOPEDIC 2D73ET75

## (undated) DEVICE — SU VICRYL 2-0 SH 27" UND J417H

## (undated) DEVICE — KIT ARTHREX BIO-TENODESIS AR-1676DS

## (undated) DEVICE — DRILL BIT ARTHREX 2.5MM AR-8943-42

## (undated) DEVICE — SPONGE SURGIFOAM 12 1972

## (undated) RX ORDER — KETOROLAC TROMETHAMINE 30 MG/ML
INJECTION, SOLUTION INTRAMUSCULAR; INTRAVENOUS
Status: DISPENSED
Start: 2018-08-02

## (undated) RX ORDER — DEXAMETHASONE SODIUM PHOSPHATE 4 MG/ML
INJECTION, SOLUTION INTRA-ARTICULAR; INTRALESIONAL; INTRAMUSCULAR; INTRAVENOUS; SOFT TISSUE
Status: DISPENSED
Start: 2018-08-02

## (undated) RX ORDER — PROPOFOL 10 MG/ML
INJECTION, EMULSION INTRAVENOUS
Status: DISPENSED
Start: 2018-08-02

## (undated) RX ORDER — LIDOCAINE HYDROCHLORIDE AND EPINEPHRINE 15; 5 MG/ML; UG/ML
INJECTION, SOLUTION EPIDURAL
Status: DISPENSED
Start: 2018-08-02

## (undated) RX ORDER — ONDANSETRON 2 MG/ML
INJECTION INTRAMUSCULAR; INTRAVENOUS
Status: DISPENSED
Start: 2018-08-02

## (undated) RX ORDER — FENTANYL CITRATE 50 UG/ML
INJECTION, SOLUTION INTRAMUSCULAR; INTRAVENOUS
Status: DISPENSED
Start: 2018-08-02

## (undated) RX ORDER — LIDOCAINE HYDROCHLORIDE 10 MG/ML
INJECTION, SOLUTION EPIDURAL; INFILTRATION; INTRACAUDAL; PERINEURAL
Status: DISPENSED
Start: 2018-08-02